# Patient Record
Sex: FEMALE | Race: WHITE | NOT HISPANIC OR LATINO | Employment: OTHER | URBAN - METROPOLITAN AREA
[De-identification: names, ages, dates, MRNs, and addresses within clinical notes are randomized per-mention and may not be internally consistent; named-entity substitution may affect disease eponyms.]

---

## 2022-08-24 ENCOUNTER — OFFICE VISIT (OUTPATIENT)
Dept: INTERNAL MEDICINE | Facility: CLINIC | Age: 57
End: 2022-08-24

## 2022-08-24 VITALS
HEIGHT: 62 IN | HEART RATE: 68 BPM | RESPIRATION RATE: 15 BRPM | OXYGEN SATURATION: 97 % | TEMPERATURE: 97.6 F | BODY MASS INDEX: 36.8 KG/M2 | SYSTOLIC BLOOD PRESSURE: 128 MMHG | WEIGHT: 200 LBS | DIASTOLIC BLOOD PRESSURE: 68 MMHG

## 2022-08-24 DIAGNOSIS — F33.1 MAJOR DEPRESSIVE DISORDER, RECURRENT EPISODE, MODERATE DEGREE: ICD-10-CM

## 2022-08-24 DIAGNOSIS — Z12.4 SCREENING FOR CERVICAL CANCER: ICD-10-CM

## 2022-08-24 DIAGNOSIS — Z00.00 ANNUAL PHYSICAL EXAM: Primary | ICD-10-CM

## 2022-08-24 DIAGNOSIS — F41.1 GENERALIZED ANXIETY DISORDER: ICD-10-CM

## 2022-08-24 DIAGNOSIS — E03.9 HYPOTHYROIDISM, UNSPECIFIED TYPE: ICD-10-CM

## 2022-08-24 DIAGNOSIS — E78.2 MIXED HYPERLIPIDEMIA: ICD-10-CM

## 2022-08-24 DIAGNOSIS — Z12.31 ENCOUNTER FOR SCREENING MAMMOGRAM FOR BREAST CANCER: ICD-10-CM

## 2022-08-24 LAB
ALBUMIN SERPL-MCNC: 4.5 G/DL (ref 3.5–5.2)
ALBUMIN/GLOB SERPL: 1.7 G/DL
ALP SERPL-CCNC: 93 U/L (ref 39–117)
ALT SERPL W P-5'-P-CCNC: 35 U/L (ref 1–33)
ANION GAP SERPL CALCULATED.3IONS-SCNC: 9.8 MMOL/L (ref 5–15)
AST SERPL-CCNC: 29 U/L (ref 1–32)
BASOPHILS # BLD AUTO: 0.05 10*3/MM3 (ref 0–0.2)
BASOPHILS NFR BLD AUTO: 0.9 % (ref 0–1.5)
BILIRUB SERPL-MCNC: 0.4 MG/DL (ref 0–1.2)
BUN SERPL-MCNC: 14 MG/DL (ref 6–20)
BUN/CREAT SERPL: 15.9 (ref 7–25)
CALCIUM SPEC-SCNC: 9.5 MG/DL (ref 8.6–10.5)
CHLORIDE SERPL-SCNC: 101 MMOL/L (ref 98–107)
CHOLEST SERPL-MCNC: 147 MG/DL (ref 0–200)
CO2 SERPL-SCNC: 27.2 MMOL/L (ref 22–29)
CREAT SERPL-MCNC: 0.88 MG/DL (ref 0.57–1)
DEPRECATED RDW RBC AUTO: 40.3 FL (ref 37–54)
EGFRCR SERPLBLD CKD-EPI 2021: 76.3 ML/MIN/1.73
EOSINOPHIL # BLD AUTO: 0.08 10*3/MM3 (ref 0–0.4)
EOSINOPHIL NFR BLD AUTO: 1.4 % (ref 0.3–6.2)
ERYTHROCYTE [DISTWIDTH] IN BLOOD BY AUTOMATED COUNT: 13.1 % (ref 12.3–15.4)
GLOBULIN UR ELPH-MCNC: 2.7 GM/DL
GLUCOSE SERPL-MCNC: 91 MG/DL (ref 65–99)
HCT VFR BLD AUTO: 43.8 % (ref 34–46.6)
HDLC SERPL-MCNC: 47 MG/DL (ref 40–60)
HGB BLD-MCNC: 14.2 G/DL (ref 12–15.9)
IMM GRANULOCYTES # BLD AUTO: 0.01 10*3/MM3 (ref 0–0.05)
IMM GRANULOCYTES NFR BLD AUTO: 0.2 % (ref 0–0.5)
LDLC SERPL CALC-MCNC: 82 MG/DL (ref 0–100)
LDLC/HDLC SERPL: 1.71 {RATIO}
LYMPHOCYTES # BLD AUTO: 1.93 10*3/MM3 (ref 0.7–3.1)
LYMPHOCYTES NFR BLD AUTO: 33.8 % (ref 19.6–45.3)
MCH RBC QN AUTO: 27.2 PG (ref 26.6–33)
MCHC RBC AUTO-ENTMCNC: 32.4 G/DL (ref 31.5–35.7)
MCV RBC AUTO: 83.9 FL (ref 79–97)
MONOCYTES # BLD AUTO: 0.38 10*3/MM3 (ref 0.1–0.9)
MONOCYTES NFR BLD AUTO: 6.7 % (ref 5–12)
NEUTROPHILS NFR BLD AUTO: 3.26 10*3/MM3 (ref 1.7–7)
NEUTROPHILS NFR BLD AUTO: 57 % (ref 42.7–76)
NRBC BLD AUTO-RTO: 0 /100 WBC (ref 0–0.2)
PLATELET # BLD AUTO: 250 10*3/MM3 (ref 140–450)
PMV BLD AUTO: 10 FL (ref 6–12)
POTASSIUM SERPL-SCNC: 4.2 MMOL/L (ref 3.5–5.2)
PROT SERPL-MCNC: 7.2 G/DL (ref 6–8.5)
RBC # BLD AUTO: 5.22 10*6/MM3 (ref 3.77–5.28)
SODIUM SERPL-SCNC: 138 MMOL/L (ref 136–145)
TRIGL SERPL-MCNC: 98 MG/DL (ref 0–150)
VLDLC SERPL-MCNC: 18 MG/DL (ref 5–40)
WBC NRBC COR # BLD: 5.71 10*3/MM3 (ref 3.4–10.8)

## 2022-08-24 PROCEDURE — 88142 CYTOPATH C/V THIN LAYER: CPT | Performed by: PHYSICIAN ASSISTANT

## 2022-08-24 PROCEDURE — 85025 COMPLETE CBC W/AUTO DIFF WBC: CPT | Performed by: PHYSICIAN ASSISTANT

## 2022-08-24 PROCEDURE — 84443 ASSAY THYROID STIM HORMONE: CPT | Performed by: PHYSICIAN ASSISTANT

## 2022-08-24 PROCEDURE — 99386 PREV VISIT NEW AGE 40-64: CPT | Performed by: PHYSICIAN ASSISTANT

## 2022-08-24 PROCEDURE — G0123 SCREEN CERV/VAG THIN LAYER: HCPCS | Performed by: PHYSICIAN ASSISTANT

## 2022-08-24 PROCEDURE — 80061 LIPID PANEL: CPT | Performed by: PHYSICIAN ASSISTANT

## 2022-08-24 PROCEDURE — 84439 ASSAY OF FREE THYROXINE: CPT | Performed by: PHYSICIAN ASSISTANT

## 2022-08-24 PROCEDURE — 36415 COLL VENOUS BLD VENIPUNCTURE: CPT | Performed by: PHYSICIAN ASSISTANT

## 2022-08-24 PROCEDURE — 99213 OFFICE O/P EST LOW 20 MIN: CPT | Performed by: PHYSICIAN ASSISTANT

## 2022-08-24 PROCEDURE — 80053 COMPREHEN METABOLIC PANEL: CPT | Performed by: PHYSICIAN ASSISTANT

## 2022-08-24 RX ORDER — ATORVASTATIN CALCIUM 10 MG/1
10 TABLET, FILM COATED ORAL DAILY
COMMUNITY
End: 2023-01-03

## 2022-08-24 RX ORDER — CETIRIZINE HYDROCHLORIDE 10 MG/1
TABLET ORAL
COMMUNITY
Start: 2022-07-21 | End: 2022-08-31 | Stop reason: SDUPTHER

## 2022-08-24 RX ORDER — LEVOTHYROXINE SODIUM 25 MCG
TABLET ORAL
COMMUNITY
Start: 2022-07-20 | End: 2022-08-31 | Stop reason: SDUPTHER

## 2022-08-24 RX ORDER — CITALOPRAM 20 MG/1
20 TABLET ORAL DAILY
Qty: 90 TABLET | Refills: 1 | Status: SHIPPED | OUTPATIENT
Start: 2022-08-24 | End: 2023-02-01 | Stop reason: SDUPTHER

## 2022-08-24 RX ORDER — CITALOPRAM 20 MG/1
20 TABLET ORAL DAILY
COMMUNITY
End: 2022-08-24 | Stop reason: SDUPTHER

## 2022-08-24 NOTE — ASSESSMENT & PLAN NOTE
Reviewed preventative medication recommendations that are age appropriate for the patient. Education provided for health and wellness. Encouraged healthy diet, regular exercise, and routine wellness checkups.

## 2022-08-24 NOTE — PROGRESS NOTES
"Chief Complaint  Anxiety and Depression    Subjective          Soheila Griffin presents to [facility]  Last PCP: Family [facility] - Bernie Nagy   Previous Specialists: none  Last labs: April 2022  Last mammogram: >2 yrs, no family history of breast cancer  Last pap: >2 yrs. She states the last pap was abnormal but the repeat was normal  Denies fhx cervical cancer  Last colonoscopy: 2016 at [facility],  no family history of colon cancer     Anxiety and depression: feels mood is doing well   Mood doing well on Celexa.    Thyroid: has been taking synthroid daily    HLD: pt has been taking atrovastatin 10mg at home because 20mg causes cramping in legs.  Denies chest pain, palpitations, dizziness, sob.  Pt states she has lost 50 lbs in the past year   She is doing Azeb.         Past Medical History:   Diagnosis Date   • Allergic    • Anxiety    • Depression    • Hyperlipidemia    • Hypothyroidism         Past Surgical History:   Procedure Laterality Date   • CHOLECYSTECTOMY  2017   • ENDOMETRIAL ABLATION  2016   • SHOULDER SURGERY Left 2016        Current Outpatient Medications on File Prior to Visit   Medication Sig Dispense Refill   • atorvastatin (LIPITOR) 10 MG tablet Take 10 mg by mouth Daily.     • cetirizine (zyrTEC) 10 MG tablet      • Synthroid 25 MCG tablet      • [DISCONTINUED] citalopram (CeleXA) 20 MG tablet Take 20 mg by mouth Daily.       No current facility-administered medications on file prior to visit.        Allergies   Allergen Reactions   • Sulfamethoxazole-Trimethoprim Photosensitivity   • Amoxicillin Rash     CHILDHOOD RX   • Oxycodone-Acetaminophen Palpitations       Social History     Tobacco Use   Smoking Status Never Smoker   Smokeless Tobacco Never Used      Objective   Vital Signs:   /68   Pulse 68   Temp 97.6 °F (36.4 °C)   Resp 15   Ht 157.5 cm (62\")   Wt 90.7 kg (200 lb)   SpO2 97%   BMI 36.58 kg/m²     Physical " Exam  Vitals and nursing note reviewed. Exam conducted with a chaperone present.   Constitutional:       Appearance: Normal appearance.   HENT:      Head: Normocephalic and atraumatic.      Nose: Nose normal.      Mouth/Throat:      Mouth: Mucous membranes are moist.   Eyes:      Extraocular Movements: Extraocular movements intact.      Conjunctiva/sclera: Conjunctivae normal.      Pupils: Pupils are equal, round, and reactive to light.   Cardiovascular:      Rate and Rhythm: Normal rate and regular rhythm.   Pulmonary:      Effort: Pulmonary effort is normal.      Breath sounds: Normal breath sounds.   Chest:   Breasts:      Right: Normal. No axillary adenopathy.      Left: Normal. No axillary adenopathy.       Abdominal:      General: Abdomen is flat. Bowel sounds are normal.      Palpations: Abdomen is soft.   Genitourinary:     Vagina: Normal.      Cervix: Normal.      Uterus: Normal.       Adnexa: Right adnexa normal and left adnexa normal.      Rectum: Normal.   Musculoskeletal:         General: Normal range of motion.      Cervical back: Normal range of motion and neck supple.   Lymphadenopathy:      Upper Body:      Right upper body: No axillary adenopathy.      Left upper body: No axillary adenopathy.   Neurological:      General: No focal deficit present.      Mental Status: She is alert and oriented to person, place, and time.   Psychiatric:         Mood and Affect: Mood normal.        Result Review :                 Assessment and Plan    Diagnoses and all orders for this visit:    1. Annual physical exam (Primary)  Assessment & Plan:  Reviewed preventative medication recommendations that are age appropriate for the patient. Education provided for health and wellness. Encouraged healthy diet, regular exercise, and routine wellness checkups.        2. Hypothyroidism, unspecified type  Comments:  Labs today, will adjust medicine if needed based on results  Orders:  -     TSH  -     T4, free    3. Encounter  for screening mammogram for breast cancer  -     Mammo Screening Bilateral With CAD    4. Generalized anxiety disorder    5. Major depressive disorder, recurrent episode, moderate degree (HCC)  Comments:  Mood stable, cont Celexa  Orders:  -     Comprehensive Metabolic Panel  -     CBC & Differential    6. Mixed hyperlipidemia  Comments:  Labs today, will adjust medicine if needed based on results  Orders:  -     Lipid Panel    7. Screening for cervical cancer  -     IGP,rfx Aptima HPV All Pth; Future  -     IGP,rfx Aptima HPV All Pth    Other orders  -     citalopram (CeleXA) 20 MG tablet; Take 1 tablet by mouth Daily.  Dispense: 90 tablet; Refill: 1      Follow Up   Return in about 5 months (around 1/24/2023).  Patient was given instructions and counseling regarding her condition or for health maintenance advice. Please see specific information pulled into the AVS if appropriate.

## 2022-08-24 NOTE — PATIENT INSTRUCTIONS
"High Cholesterol    High cholesterol is a condition in which the blood has high levels of a white, waxy substance similar to fat (cholesterol). The liver makes all the cholesterol that the body needs. The human body needs small amounts of cholesterol to help build cells. A person gets extra or excess cholesterol from the food that he or she eats.  The blood carries cholesterol from the liver to the rest of the body. If you have high cholesterol, deposits (plaques) may build up on the walls of your arteries. Arteries are the blood vessels that carry blood away from your heart. These plaques make the arteries narrow and stiff.  Cholesterol plaques increase your risk for heart attack and stroke. Work with your health care provider to keep your cholesterol levels in a healthy range.  What increases the risk?  The following factors may make you more likely to develop this condition:  Eating foods that are high in animal fat (saturated fat) or cholesterol.  Being overweight.  Not getting enough exercise.  A family history of high cholesterol (familial hypercholesterolemia).  Use of tobacco products.  Having diabetes.  What are the signs or symptoms?  There are no symptoms of this condition.  How is this diagnosed?  This condition may be diagnosed based on the results of a blood test.  If you are older than 20 years of age, your health care provider may check your cholesterol levels every 4-6 years.  You may be checked more often if you have high cholesterol or other risk factors for heart disease.  The blood test for cholesterol measures:  \"Bad\" cholesterol, or LDL cholesterol. This is the main type of cholesterol that causes heart disease. The desired level is less than 100 mg/dL.  \"Good\" cholesterol, or HDL cholesterol. HDL helps protect against heart disease by cleaning the arteries and carrying the LDL to the liver for processing. The desired level for HDL is 60 mg/dL or higher.  Triglycerides. These are fats that " your body can store or burn for energy. The desired level is less than 150 mg/dL.  Total cholesterol. This measures the total amount of cholesterol in your blood and includes LDL, HDL, and triglycerides. The desired level is less than 200 mg/dL.  How is this treated?  This condition may be treated with:  Diet changes. You may be asked to eat foods that have more fiber and less saturated fats or added sugar.  Lifestyle changes. These may include regular exercise, maintaining a healthy weight, and quitting use of tobacco products.  Medicines. These are given when diet and lifestyle changes have not worked. You may be prescribed a statin medicine to help lower your cholesterol levels.  Follow these instructions at home:  Eating and drinking    Eat a healthy, balanced diet. This diet includes:  Daily servings of a variety of fresh, frozen, or canned fruits and vegetables.  Daily servings of whole grain foods that are rich in fiber.  Foods that are low in saturated fats and trans fats. These include poultry and fish without skin, lean cuts of meat, and low-fat dairy products.  A variety of fish, especially oily fish that contain omega-3 fatty acids. Aim to eat fish at least 2 times a week.  Avoid foods and drinks that have added sugar.  Use healthy cooking methods, such as roasting, grilling, broiling, baking, poaching, steaming, and stir-frying. Do not foy your food except for stir-frying.    Lifestyle    Get regular exercise. Aim to exercise for a total of 150 minutes a week. Increase your activity level by doing activities such as gardening, walking, and taking the stairs.  Do not use any products that contain nicotine or tobacco, such as cigarettes, e-cigarettes, and chewing tobacco. If you need help quitting, ask your health care provider.    General instructions  Take over-the-counter and prescription medicines only as told by your health care provider.  Keep all follow-up visits as told by your health care  "provider. This is important.  Where to find more information  American Heart Association: www.heart.org  National Heart, Lung, and Blood [place]: www.nhlbi.nih.gov  Contact a health care provider if:  You have trouble achieving or maintaining a healthy diet or weight.  You are starting an exercise program.  You are unable to stop smoking.  Get help right away if:  You have chest pain.  You have trouble breathing.  You have any symptoms of a stroke. \"BE FAST\" is an easy way to remember the main warning signs of a stroke:  B - Balance. Signs are dizziness, sudden trouble walking, or loss of balance.  E - Eyes. Signs are trouble seeing or a sudden change in vision.  F - Face. Signs are sudden weakness or numbness of the face, or the face or eyelid drooping on one side.  A - Arms. Signs are weakness or numbness in an arm. This happens suddenly and usually on one side of the body.  S - Speech. Signs are sudden trouble speaking, slurred speech, or trouble understanding what people say.  T - Time. Time to call emergency services. Write down what time symptoms started.  You have other signs of a stroke, such as:  A sudden, severe headache with no known cause.  Nausea or vomiting.  Seizure.  These symptoms may represent a serious problem that is an emergency. Do not wait to see if the symptoms will go away. Get medical help right away. Call your local emergency services (911 in the [place] Do not drive yourself to the hospital.  Summary  Cholesterol plaques increase your risk for heart attack and stroke. Work with your health care provider to keep your cholesterol levels in a healthy range.  Eat a healthy, balanced diet, get regular exercise, and maintain a healthy weight.  Do not use any products that contain nicotine or tobacco, such as cigarettes, e-cigarettes, and chewing tobacco.  Get help right away if you have any symptoms of a stroke.  This information is not intended to replace advice given to you by your health " care provider. Make sure you discuss any questions you have with your health care provider.  Document Revised: 11/16/2020 Document Reviewed: 11/16/2020  Elsevier Patient Education © 2021 Elsevier Inc.

## 2022-08-25 LAB
T4 FREE SERPL-MCNC: 1.06 NG/DL (ref 0.93–1.7)
TSH SERPL DL<=0.05 MIU/L-ACNC: 3.13 UIU/ML (ref 0.27–4.2)

## 2022-08-26 ENCOUNTER — PATIENT MESSAGE (OUTPATIENT)
Dept: INTERNAL MEDICINE | Facility: CLINIC | Age: 57
End: 2022-08-26

## 2022-08-29 LAB
CONV .: NORMAL
CYTOLOGIST CVX/VAG CYTO: NORMAL
CYTOLOGY CVX/VAG DOC CYTO: NORMAL
CYTOLOGY CVX/VAG DOC THIN PREP: NORMAL
DX ICD CODE: NORMAL
HIV 1 & 2 AB SER-IMP: NORMAL
Lab: NORMAL
OTHER STN SPEC: NORMAL
RECOM F/U CVX/VAG CYTO: NORMAL
STAT OF ADQ CVX/VAG CYTO-IMP: NORMAL

## 2022-08-31 RX ORDER — CETIRIZINE HYDROCHLORIDE 10 MG/1
10 TABLET ORAL DAILY
Qty: 90 TABLET | Refills: 1 | Status: SHIPPED | OUTPATIENT
Start: 2022-08-31 | End: 2022-10-19 | Stop reason: SDUPTHER

## 2022-08-31 RX ORDER — LEVOTHYROXINE SODIUM 25 MCG
25 TABLET ORAL DAILY
Qty: 90 TABLET | Refills: 1 | Status: SHIPPED | OUTPATIENT
Start: 2022-08-31 | End: 2022-10-19 | Stop reason: SDUPTHER

## 2022-08-31 NOTE — TELEPHONE ENCOUNTER
From: Soheila Griffin  To: Brittnee Kovacs PA-C  Sent: 8/26/2022 9:04 AM EDT  Subject: Question regarding LIPID PANEL    Yay. So can I stop taking the cholesterol meds or just drop down to 5mg

## 2022-09-07 ENCOUNTER — OFFICE VISIT (OUTPATIENT)
Dept: INTERNAL MEDICINE | Facility: CLINIC | Age: 57
End: 2022-09-07

## 2022-09-07 DIAGNOSIS — N76.0 ACUTE VAGINITIS: ICD-10-CM

## 2022-09-07 DIAGNOSIS — Z12.4 SCREENING FOR CERVICAL CANCER: Primary | ICD-10-CM

## 2022-09-07 LAB
CANDIDA SPECIES: NEGATIVE
GARDNERELLA VAGINALIS: POSITIVE
T VAGINALIS DNA VAG QL PROBE+SIG AMP: NEGATIVE

## 2022-09-07 PROCEDURE — 87510 GARDNER VAG DNA DIR PROBE: CPT | Performed by: PHYSICIAN ASSISTANT

## 2022-09-07 PROCEDURE — 88142 CYTOPATH C/V THIN LAYER: CPT | Performed by: PHYSICIAN ASSISTANT

## 2022-09-07 PROCEDURE — G0123 SCREEN CERV/VAG THIN LAYER: HCPCS | Performed by: PHYSICIAN ASSISTANT

## 2022-09-07 PROCEDURE — 87480 CANDIDA DNA DIR PROBE: CPT | Performed by: PHYSICIAN ASSISTANT

## 2022-09-07 PROCEDURE — 87660 TRICHOMONAS VAGIN DIR PROBE: CPT | Performed by: PHYSICIAN ASSISTANT

## 2022-09-07 RX ORDER — METRONIDAZOLE 500 MG/1
500 TABLET ORAL 2 TIMES DAILY
Qty: 14 TABLET | Refills: 0 | Status: SHIPPED | OUTPATIENT
Start: 2022-09-07 | End: 2022-09-14

## 2022-09-07 NOTE — PROGRESS NOTES
Chief Complaint  Repeat pap- no charge  Subjective          Soheila Griffin presents to [facility]  Pt here for repeat pap due to insufficient evaluation from last labs  Denies vaginal discharge, odor, lesions  She states she had to repeat a pap in the past for unknown reason  Only abnormal pap was when she was pregnant years ago.      Past Medical History:   Diagnosis Date   • Allergic    • Anxiety    • Depression    • Hyperlipidemia    • Hypothyroidism         Past Surgical History:   Procedure Laterality Date   • CHOLECYSTECTOMY  2017   • ENDOMETRIAL ABLATION  2016   • SHOULDER SURGERY Left 2016        Current Outpatient Medications on File Prior to Visit   Medication Sig Dispense Refill   • atorvastatin (LIPITOR) 10 MG tablet Take 10 mg by mouth Daily.     • cetirizine (zyrTEC) 10 MG tablet Take 1 tablet by mouth Daily. 90 tablet 1   • citalopram (CeleXA) 20 MG tablet Take 1 tablet by mouth Daily. 90 tablet 1   • Synthroid 25 MCG tablet Take 1 tablet by mouth Daily. 90 tablet 1     No current facility-administered medications on file prior to visit.        Allergies   Allergen Reactions   • Sulfamethoxazole-Trimethoprim Photosensitivity   • Amoxicillin Rash     CHILDHOOD RX   • Oxycodone-Acetaminophen Palpitations       Social History     Tobacco Use   Smoking Status Never Smoker   Smokeless Tobacco Never Used          Objective   Vital Signs:   There were no vitals taken for this visit.    Physical Exam  Vitals and nursing note reviewed. Exam conducted with a chaperone present.   Constitutional:       Appearance: Normal appearance.   Cardiovascular:      Rate and Rhythm: Normal rate and regular rhythm.   Pulmonary:      Effort: Pulmonary effort is normal.      Breath sounds: Normal breath sounds.   Chest:   Breasts:      Right: No axillary adenopathy.      Left: No axillary adenopathy.       Abdominal:      General: Bowel sounds are normal.      Palpations:  Abdomen is soft.   Genitourinary:     Vagina: Normal.      Cervix: Normal.      Uterus: Normal.       Adnexa: Right adnexa normal and left adnexa normal.      Rectum: Normal.   Musculoskeletal:      Cervical back: Normal range of motion and neck supple.   Lymphadenopathy:      Upper Body:      Right upper body: No axillary adenopathy.      Left upper body: No axillary adenopathy.   Neurological:      General: No focal deficit present.      Mental Status: She is alert and oriented to person, place, and time.   Psychiatric:         Mood and Affect: Mood normal.        Result Review :                 Assessment and Plan    Diagnoses and all orders for this visit:    1. Screening for cervical cancer (Primary)  Comments:  repeat pap today. Will add vaginal screen since previous unable to be read due to inflammatory cells. PE WNL.  Orders:  -     IGP,rfx Aptima HPV All Pth; Future    2. Acute vaginitis  -     Gardnerella vaginalis, Trichomonas vaginalis, Candida albicans, DNA - Swab, Vagina; Future        Follow Up   Return if symptoms worsen or fail to improve.  Patient was given instructions and counseling regarding her condition or for health maintenance advice. Please see specific information pulled into the AVS if appropriate.         This encounter was created in error - please disregard.

## 2022-09-14 DIAGNOSIS — Z12.4 SCREENING FOR CERVICAL CANCER: Primary | ICD-10-CM

## 2022-10-19 RX ORDER — LEVOTHYROXINE SODIUM 25 MCG
25 TABLET ORAL DAILY
Qty: 90 TABLET | Refills: 1 | Status: SHIPPED | OUTPATIENT
Start: 2022-10-19 | End: 2023-02-01 | Stop reason: SDUPTHER

## 2022-10-19 RX ORDER — CETIRIZINE HYDROCHLORIDE 10 MG/1
10 TABLET ORAL DAILY
Qty: 90 TABLET | Refills: 1 | Status: SHIPPED | OUTPATIENT
Start: 2022-10-19 | End: 2023-02-01 | Stop reason: SDUPTHER

## 2022-10-19 NOTE — TELEPHONE ENCOUNTER
Caller: Elias Soheila NOGUERA    Relationship: Self    Best call back number: [phone]    Requested Prescriptions:   Requested Prescriptions     Pending Prescriptions Disp Refills   • Synthroid 25 MCG tablet 90 tablet 1     Sig: Take 1 tablet by mouth Daily.   • cetirizine (zyrTEC) 10 MG tablet 90 tablet 1     Sig: Take 1 tablet by mouth Daily.        [place] [place] [place] [address] [phone] [facility] [phone] [facility]     Additional details provided by patient:         Does the patient have less than a 3 day supply:  [x] Yes  [] No    Renetta Brantley Rep   10/19/22 09:51 EDT

## 2022-10-20 RX ORDER — CETIRIZINE HYDROCHLORIDE 10 MG/1
10 TABLET ORAL DAILY
Qty: 90 TABLET | Refills: 1 | OUTPATIENT
Start: 2022-10-20

## 2022-10-20 RX ORDER — LEVOTHYROXINE SODIUM 25 MCG
25 TABLET ORAL DAILY
Qty: 90 TABLET | Refills: 1 | OUTPATIENT
Start: 2022-10-20

## 2023-01-03 ENCOUNTER — OFFICE VISIT (OUTPATIENT)
Dept: OBSTETRICS AND GYNECOLOGY | Facility: CLINIC | Age: 58
End: 2023-01-03
Payer: OTHER GOVERNMENT

## 2023-01-03 ENCOUNTER — TRANSCRIBE ORDERS (OUTPATIENT)
Dept: ADMINISTRATIVE | Facility: HOSPITAL | Age: 58
End: 2023-01-03
Payer: OTHER GOVERNMENT

## 2023-01-03 VITALS
WEIGHT: 189 LBS | SYSTOLIC BLOOD PRESSURE: 125 MMHG | DIASTOLIC BLOOD PRESSURE: 83 MMHG | BODY MASS INDEX: 34.78 KG/M2 | HEART RATE: 102 BPM | HEIGHT: 62 IN

## 2023-01-03 DIAGNOSIS — N90.4 LICHEN SCLEROSUS OF FEMALE GENITALIA: ICD-10-CM

## 2023-01-03 DIAGNOSIS — Z12.31 SCREENING MAMMOGRAM FOR BREAST CANCER: Primary | ICD-10-CM

## 2023-01-03 DIAGNOSIS — Z01.419 WELL WOMAN EXAM WITH ROUTINE GYNECOLOGICAL EXAM: ICD-10-CM

## 2023-01-03 DIAGNOSIS — R87.615 UNSATISFACTORY CERVICAL PAPANICOLAOU SMEAR: Primary | ICD-10-CM

## 2023-01-03 DIAGNOSIS — N95.8 GENITOURINARY SYNDROME OF MENOPAUSE: ICD-10-CM

## 2023-01-03 PROCEDURE — G0123 SCREEN CERV/VAG THIN LAYER: HCPCS | Performed by: OBSTETRICS & GYNECOLOGY

## 2023-01-03 PROCEDURE — 87624 HPV HI-RISK TYP POOLED RSLT: CPT | Performed by: OBSTETRICS & GYNECOLOGY

## 2023-01-03 PROCEDURE — 1159F MED LIST DOCD IN RCRD: CPT | Performed by: OBSTETRICS & GYNECOLOGY

## 2023-01-03 PROCEDURE — 99204 OFFICE O/P NEW MOD 45 MIN: CPT | Performed by: OBSTETRICS & GYNECOLOGY

## 2023-01-03 PROCEDURE — 1160F RVW MEDS BY RX/DR IN RCRD: CPT | Performed by: OBSTETRICS & GYNECOLOGY

## 2023-01-03 RX ORDER — ESTRADIOL 10 UG/1
1 INSERT VAGINAL 2 TIMES WEEKLY
Qty: 8 TABLET | Refills: 11 | Status: SHIPPED | OUTPATIENT
Start: 2023-01-05

## 2023-01-03 RX ORDER — CLOBETASOL PROPIONATE 0.5 MG/G
1 OINTMENT TOPICAL 2 TIMES DAILY
Qty: 45 G | Refills: 3 | Status: SHIPPED | OUTPATIENT
Start: 2023-01-03

## 2023-01-03 NOTE — ASSESSMENT & PLAN NOTE
Pt had a pap smear in 8/2022 which was unsatisfactory and repeat in 9/2022 was also unsatisfactory  PCP subsequently ref pt her for further eval  Patient does have moderate vaginal atrophy on exam.  We discussed risks benefits of localized vaginal estrogen therapy.

## 2023-01-03 NOTE — PROGRESS NOTES
Well Woman Visit    CC: Scheduled annual well gyn visit  No chief complaint on file.      Myriad intake in the past?: {YES NO:42598}    {IF HAS FILLED OUT MYRIADTABLET IN PAST (Optional):06554}    {Birth control or /HRT (Optional):91159}    HPI:   57 y.o.No obstetric history on file.     Menses:   q *** days, lasts *** days, changes products q ***hrs on heaviest days.     Pain:  {APPAIN:96320}    {PCP:98329}  History: PMHx, Meds, Allergies, PSHx, Social Hx, and POBHx all reviewed and updated.    {APWWECO (Optional):21395}    PHYSICAL EXAM:  There were no vitals taken for this visit. Not found.  General- NAD, alert and oriented, appropriate  Psych- Normal mood, good memory  Neck- No masses, no thyroid enlargement  CV- Regular rhythm, no murnurs  Resp- CTA to bases, no wheezes  Abdomen- Soft, non distended, non tender, no masses    Breast left-  Bilaterally symmetrical, no masses, non tender, no nipple discharge  Breast right- Bilaterally symmetrical, no masses, non tender, no nipple discharge    External genitalia- Normal female, no lesions  Urethra/meatus- Normal, no masses, non tender  Bladder- Normal, no masses, non tender  Vagina- Normal, no atrophy, no lesions, no discharge.  {Prolapse (Optional):31205}  Cvx- {APCVX:01529}  Uterus- {APUTERUS:98659}  Adnexa- {APADNEXA:20313}  Anus/Rectum/Perineum- {APRECTAL:26685}    Lymphatic- No palpable neck, axillary, or groin nodes  Ext- No edema, no cyanosis    Skin- No lesions, no rashes, no acanthosis nigricans  {APNEXPLANON (Optional):73008}    ASSESSMENT and PLAN:    Diagnoses and all orders for this visit:    1. Well woman exam with routine gynecological exam (Primary)        Preventative:  • {WWE Preventative:32562}  {MYRIAD (Optional):29042}  {APGYNCOUNSELING (Optional):07702}  She understands the importance of having any ordered tests to be performed in a timely fashion.  The risks of not performing them include, but are not limited to, advanced cancer stages,  bone loss from osteoporosis and/or subsequent increase in morbidity and/or mortality.  She is encouraged to review her results online and/or contact or office if she has questions.     Follow Up:  No follow-ups on file.    {Separate E+M Time Carve Out (Optional):06270}  {Time Spent-Use for E/M Coding (Optional):99169}      Gianna Weber MD  01/03/2023    [facility]  [facility] [place] [address]  Dept: [phone]  Dept Fax: [phone]  Loc: [phone]  Loc Fax: [phone]

## 2023-01-03 NOTE — ASSESSMENT & PLAN NOTE
Patient complained of vulvovaginal pruritus  On exam patient has symmetric changes of the vulva consistent with lichen sclerosis.  There is white thickened skin with multiple excoriations from clitoral prepuce to the bilateral labia minora the introitus and the bilateral labia majora and perineum.  There are moderate clitoral adhesions and mild bilateral resorption of labia minora.  Discussed lichen sclerosus and we will do a trial of clobetasol twice daily for 6 weeks.  Patient will follow-up for a confirmatory vulvar biopsy

## 2023-01-03 NOTE — PROGRESS NOTES
GYN new patient    CC: unsatisfactory pap smear    Tobacco/Nicotine use:  No    HPI:   57 y.o. Contraception or HRT: Post menopausal, using no HRT  Pt has concerns she would like to discuss.      History: PMHx, Meds, Allergies, PSHx, Social Hx, and POBHx all reviewed and updated.  PCP:Brittnee Kovacs PA-C      Review of Systems     /83   Pulse 102   Ht 157.5 cm (62\")   Wt 85.7 kg (189 lb)   Breastfeeding No   BMI 34.57 kg/m²     Physical Exam  Exam conducted with a chaperone present.   Genitourinary:     Exam position: Lithotomy position.      Labia:         Left: Lesion present.       Urethra: No prolapse.      Vagina: No prolapsed vaginal walls. Lesions:  Moderate vaginal atrophy.      Cervix: Normal.             ASSESSMENT AND PLAN:  Problem Visit    Diagnoses and all orders for this visit:    1. Unsatisfactory cervical Papanicolaou smear (Primary)  Assessment & Plan:  Pt had a pap smear in 2022 which was unsatisfactory and repeat in 2022 was also unsatisfactory  PCP subsequently ref pt her for further eval  Patient does have moderate vaginal atrophy on exam.  We discussed risks benefits of localized vaginal estrogen therapy.      2. Well woman exam with routine gynecological exam  -     IgP, Aptima HPV    3. Lichen sclerosus of female genitalia  Assessment & Plan:  Patient complained of vulvovaginal pruritus  On exam patient has symmetric changes of the vulva consistent with lichen sclerosis.  There is white thickened skin with multiple excoriations from clitoral prepuce to the bilateral labia minora the introitus and the bilateral labia majora and perineum.  There are moderate clitoral adhesions and mild bilateral resorption of labia minora.  Discussed lichen sclerosus and we will do a trial of clobetasol twice daily for 6 weeks.  Patient will follow-up for a confirmatory vulvar biopsy    Orders:  -     clobetasol (TEMOVATE) 0.05 % ointment; Apply 1 application topically to the  appropriate area as directed 2 (Two) Times a Day.  Dispense: 45 g; Refill: 3    4. Genitourinary syndrome of menopause  Assessment & Plan:  Exam consistent with moderate GSM    Orders:  -     estradiol (Vagifem) 10 MCG tablet vaginal tablet; Insert 1 tablet into the vagina 2 (Two) Times a Week.  Dispense: 8 tablet; Refill: 11                Follow Up:  Return in about 6 weeks (around 2/14/2023) for Vulvar biopsy.        Gianna Weber MD  01/03/2023

## 2023-01-08 LAB
CYTOLOGIST CVX/VAG CYTO: NORMAL
CYTOLOGY CVX/VAG DOC CYTO: NORMAL
CYTOLOGY CVX/VAG DOC THIN PREP: NORMAL
DX ICD CODE: NORMAL
HIV 1 & 2 AB SER-IMP: NORMAL
HPV I/H RISK 4 DNA CVX QL PROBE+SIG AMP: NEGATIVE
OTHER STN SPEC: NORMAL
STAT OF ADQ CVX/VAG CYTO-IMP: NORMAL

## 2023-01-30 RX ORDER — CITALOPRAM 20 MG/1
20 TABLET ORAL DAILY
Qty: 90 TABLET | Refills: 1 | Status: CANCELLED | OUTPATIENT
Start: 2023-01-30

## 2023-01-31 ENCOUNTER — HOSPITAL ENCOUNTER (OUTPATIENT)
Dept: MAMMOGRAPHY | Facility: HOSPITAL | Age: 58
Discharge: HOME | End: 2023-01-31
Admitting: PHYSICIAN ASSISTANT
Payer: OTHER GOVERNMENT

## 2023-01-31 DIAGNOSIS — Z12.31 SCREENING MAMMOGRAM FOR BREAST CANCER: ICD-10-CM

## 2023-01-31 PROCEDURE — 77067 SCR MAMMO BI INCL CAD: CPT

## 2023-01-31 PROCEDURE — 77063 BREAST TOMOSYNTHESIS BI: CPT

## 2023-02-01 ENCOUNTER — OFFICE VISIT (OUTPATIENT)
Dept: INTERNAL MEDICINE | Facility: CLINIC | Age: 58
End: 2023-02-01
Payer: OTHER GOVERNMENT

## 2023-02-01 VITALS
WEIGHT: 184 LBS | SYSTOLIC BLOOD PRESSURE: 118 MMHG | BODY MASS INDEX: 33.86 KG/M2 | OXYGEN SATURATION: 96 % | HEART RATE: 80 BPM | TEMPERATURE: 97.7 F | RESPIRATION RATE: 15 BRPM | HEIGHT: 62 IN | DIASTOLIC BLOOD PRESSURE: 70 MMHG

## 2023-02-01 DIAGNOSIS — F41.1 GENERALIZED ANXIETY DISORDER: ICD-10-CM

## 2023-02-01 DIAGNOSIS — E03.9 HYPOTHYROIDISM, UNSPECIFIED TYPE: ICD-10-CM

## 2023-02-01 DIAGNOSIS — F33.1 MAJOR DEPRESSIVE DISORDER, RECURRENT EPISODE, MODERATE DEGREE: Primary | ICD-10-CM

## 2023-02-01 LAB
ALBUMIN SERPL-MCNC: 4.5 G/DL (ref 3.5–5.2)
ALBUMIN/GLOB SERPL: 1.7 G/DL
ALP SERPL-CCNC: 93 U/L (ref 39–117)
ALT SERPL W P-5'-P-CCNC: 31 U/L (ref 1–33)
ANION GAP SERPL CALCULATED.3IONS-SCNC: 7 MMOL/L (ref 5–15)
AST SERPL-CCNC: 22 U/L (ref 1–32)
BASOPHILS # BLD AUTO: 0.05 10*3/MM3 (ref 0–0.2)
BASOPHILS NFR BLD AUTO: 0.9 % (ref 0–1.5)
BILIRUB SERPL-MCNC: 0.3 MG/DL (ref 0–1.2)
BUN SERPL-MCNC: 24 MG/DL (ref 6–20)
BUN/CREAT SERPL: 32 (ref 7–25)
CALCIUM SPEC-SCNC: 9.4 MG/DL (ref 8.6–10.5)
CHLORIDE SERPL-SCNC: 100 MMOL/L (ref 98–107)
CHOLEST SERPL-MCNC: 215 MG/DL (ref 0–200)
CO2 SERPL-SCNC: 30 MMOL/L (ref 22–29)
CREAT SERPL-MCNC: 0.75 MG/DL (ref 0.57–1)
DEPRECATED RDW RBC AUTO: 37.8 FL (ref 37–54)
EGFRCR SERPLBLD CKD-EPI 2021: 92.4 ML/MIN/1.73
EOSINOPHIL # BLD AUTO: 0.1 10*3/MM3 (ref 0–0.4)
EOSINOPHIL NFR BLD AUTO: 1.9 % (ref 0.3–6.2)
ERYTHROCYTE [DISTWIDTH] IN BLOOD BY AUTOMATED COUNT: 12.6 % (ref 12.3–15.4)
GLOBULIN UR ELPH-MCNC: 2.7 GM/DL
GLUCOSE SERPL-MCNC: 87 MG/DL (ref 65–99)
HCT VFR BLD AUTO: 40.8 % (ref 34–46.6)
HDLC SERPL-MCNC: 64 MG/DL (ref 40–60)
HGB BLD-MCNC: 13.4 G/DL (ref 12–15.9)
IMM GRANULOCYTES # BLD AUTO: 0.01 10*3/MM3 (ref 0–0.05)
IMM GRANULOCYTES NFR BLD AUTO: 0.2 % (ref 0–0.5)
LDLC SERPL CALC-MCNC: 139 MG/DL (ref 0–100)
LDLC/HDLC SERPL: 2.15 {RATIO}
LYMPHOCYTES # BLD AUTO: 1.96 10*3/MM3 (ref 0.7–3.1)
LYMPHOCYTES NFR BLD AUTO: 36.8 % (ref 19.6–45.3)
MCH RBC QN AUTO: 27.5 PG (ref 26.6–33)
MCHC RBC AUTO-ENTMCNC: 32.8 G/DL (ref 31.5–35.7)
MCV RBC AUTO: 83.6 FL (ref 79–97)
MONOCYTES # BLD AUTO: 0.39 10*3/MM3 (ref 0.1–0.9)
MONOCYTES NFR BLD AUTO: 7.3 % (ref 5–12)
NEUTROPHILS NFR BLD AUTO: 2.82 10*3/MM3 (ref 1.7–7)
NEUTROPHILS NFR BLD AUTO: 52.9 % (ref 42.7–76)
NRBC BLD AUTO-RTO: 0 /100 WBC (ref 0–0.2)
PLATELET # BLD AUTO: 241 10*3/MM3 (ref 140–450)
PMV BLD AUTO: 9.8 FL (ref 6–12)
POTASSIUM SERPL-SCNC: 4.4 MMOL/L (ref 3.5–5.2)
PROT SERPL-MCNC: 7.2 G/DL (ref 6–8.5)
RBC # BLD AUTO: 4.88 10*6/MM3 (ref 3.77–5.28)
SODIUM SERPL-SCNC: 137 MMOL/L (ref 136–145)
T4 FREE SERPL-MCNC: 1.06 NG/DL (ref 0.93–1.7)
TRIGL SERPL-MCNC: 67 MG/DL (ref 0–150)
TSH SERPL DL<=0.05 MIU/L-ACNC: 2.15 UIU/ML (ref 0.27–4.2)
VLDLC SERPL-MCNC: 12 MG/DL (ref 5–40)
WBC NRBC COR # BLD: 5.33 10*3/MM3 (ref 3.4–10.8)

## 2023-02-01 PROCEDURE — 36415 COLL VENOUS BLD VENIPUNCTURE: CPT | Performed by: PHYSICIAN ASSISTANT

## 2023-02-01 PROCEDURE — 85025 COMPLETE CBC W/AUTO DIFF WBC: CPT | Performed by: PHYSICIAN ASSISTANT

## 2023-02-01 PROCEDURE — 80061 LIPID PANEL: CPT | Performed by: PHYSICIAN ASSISTANT

## 2023-02-01 PROCEDURE — 99214 OFFICE O/P EST MOD 30 MIN: CPT | Performed by: PHYSICIAN ASSISTANT

## 2023-02-01 PROCEDURE — 84443 ASSAY THYROID STIM HORMONE: CPT | Performed by: PHYSICIAN ASSISTANT

## 2023-02-01 PROCEDURE — 84439 ASSAY OF FREE THYROXINE: CPT | Performed by: PHYSICIAN ASSISTANT

## 2023-02-01 PROCEDURE — 80053 COMPREHEN METABOLIC PANEL: CPT | Performed by: PHYSICIAN ASSISTANT

## 2023-02-01 RX ORDER — CETIRIZINE HYDROCHLORIDE 10 MG/1
10 TABLET ORAL DAILY
Qty: 90 TABLET | Refills: 1 | Status: SHIPPED | OUTPATIENT
Start: 2023-02-01

## 2023-02-01 RX ORDER — CITALOPRAM 20 MG/1
20 TABLET ORAL DAILY
Qty: 90 TABLET | Refills: 1 | Status: SHIPPED | OUTPATIENT
Start: 2023-02-01

## 2023-02-01 RX ORDER — LEVOTHYROXINE SODIUM 25 MCG
25 TABLET ORAL DAILY
Qty: 90 TABLET | Refills: 1 | Status: SHIPPED | OUTPATIENT
Start: 2023-02-01

## 2023-02-01 RX ORDER — CLOBETASOL PROPIONATE 0.5 MG/G
1 OINTMENT TOPICAL 2 TIMES DAILY
Qty: 45 G | Refills: 3 | Status: CANCELLED | OUTPATIENT
Start: 2023-02-01

## 2023-02-01 NOTE — PROGRESS NOTES
Chief Complaint  Anxiety, Hyperlipidemia, Hypothyroidism, and Depression    Subjective          Soheila Griffin presents to [facility]  History of Present Illness  Pt here for follow up     Hypothyroid: no issue taking synthroid   Depression and anxiety: doing well on celexa  Lichen sclerosis: seen by GYN, symptoms of irritation, rash, and itching resolved with steroid cream  She has not needed estrogen replacement    Colonoscopy: UTD per pt, had at [place] in 2016 and was told 10 yr f/u      Past Medical History:   Diagnosis Date   • Abnormal Pap smear of cervix    • Allergic    • Anxiety    • Depression    • Dysplasia of cervix     1990. [facility]   • Hyperlipidemia    • Hypothyroidism         Past Surgical History:   Procedure Laterality Date   • CHOLECYSTECTOMY  2017   • ENDOMETRIAL ABLATION  2016   • SHOULDER SURGERY Left 2016        Current Outpatient Medications on File Prior to Visit   Medication Sig Dispense Refill   • clobetasol (TEMOVATE) 0.05 % ointment Apply 1 application topically to the appropriate area as directed 2 (Two) Times a Day. 45 g 3   • [DISCONTINUED] cetirizine (zyrTEC) 10 MG tablet Take 1 tablet by mouth Daily. 90 tablet 1   • [DISCONTINUED] citalopram (CeleXA) 20 MG tablet Take 1 tablet by mouth Daily. 90 tablet 1   • [DISCONTINUED] Synthroid 25 MCG tablet Take 1 tablet by mouth Daily. 90 tablet 1   • estradiol (Vagifem) 10 MCG tablet vaginal tablet Insert 1 tablet into the vagina 2 (Two) Times a Week. 8 tablet 11     No current facility-administered medications on file prior to visit.        Allergies   Allergen Reactions   • Sulfamethoxazole-Trimethoprim Photosensitivity   • Amoxicillin Rash     CHILDHOOD RX   • Oxycodone-Acetaminophen Palpitations       Social History     Tobacco Use   Smoking Status Never   Smokeless Tobacco Never          Objective   Vital Signs:   /70   Pulse 80   Temp 97.7 °F (36.5 °C)   Resp 15   Ht  "157.5 cm (62\")   Wt 83.5 kg (184 lb)   SpO2 96%   BMI 33.65 kg/m²     Physical Exam  Vitals reviewed.   Constitutional:       Appearance: Normal appearance.   HENT:      Head: Normocephalic and atraumatic.      Nose: Nose normal.      Mouth/Throat:      Mouth: Mucous membranes are moist.   Eyes:      Extraocular Movements: Extraocular movements intact.      Conjunctiva/sclera: Conjunctivae normal.      Pupils: Pupils are equal, round, and reactive to light.   Cardiovascular:      Rate and Rhythm: Normal rate and regular rhythm.   Pulmonary:      Effort: Pulmonary effort is normal.      Breath sounds: Normal breath sounds.   Abdominal:      General: Abdomen is flat. Bowel sounds are normal.      Palpations: Abdomen is soft.   Musculoskeletal:         General: Normal range of motion.   Neurological:      General: No focal deficit present.      Mental Status: She is alert and oriented to person, place, and time.   Psychiatric:         Mood and Affect: Mood normal.        Result Review :                 Assessment and Plan    Diagnoses and all orders for this visit:    1. Major depressive disorder, recurrent episode, moderate degree (HCC) (Primary)  Assessment & Plan:  Patient's depression is recurrent and is mild without psychosis. Their depression is currently active and the condition is improving with treatment. This will be reassessed at the next regular appointment. F/U as described:patient will continue current medication therapy.      2. Generalized anxiety disorder  -     Comprehensive Metabolic Panel  -     CBC & Differential  -     TSH    3. Hypothyroidism, unspecified type  Assessment & Plan:  Labs today, will adjust medication based on results.      Orders:  -     Comprehensive Metabolic Panel  -     CBC & Differential  -     TSH  -     Lipid Panel  -     T4, Free    Other orders  -     Synthroid 25 MCG tablet; Take 1 tablet by mouth Daily.  Dispense: 90 tablet; Refill: 1  -     citalopram (CeleXA) 20 MG " tablet; Take 1 tablet by mouth Daily.  Dispense: 90 tablet; Refill: 1  -     cetirizine (zyrTEC) 10 MG tablet; Take 1 tablet by mouth Daily.  Dispense: 90 tablet; Refill: 1      Follow Up   Return in about 6 months (around 8/1/2023).  Patient was given instructions and counseling regarding her condition or for health maintenance advice. Please see specific information pulled into the AVS if appropriate.

## 2023-02-14 NOTE — PROGRESS NOTES
GYN Visit    CC: Lichen sclerosus    HPI:   57 y.o. Contraception or HRT: Post menopausal, using no HRT  Pt has concerns she would like to discuss.          History: PMHx, Meds, Allergies, PSHx, Social Hx, and POBHx all reviewed and updated.    PHYSICAL EXAM:  /73   Pulse 72   Wt 85.3 kg (188 lb)   Breastfeeding No   BMI 34.39 kg/m²   General- NAD, alert and oriented, appropriate  Psych- Normal mood, good memory      External genitalia- Normal female, no lesions no active LSE  Urethra/meatus- Normal, no masses, non tender, no prolapse  ASSESSMENT AND PLAN:  Diagnoses and all orders for this visit:    1. Lichen sclerosus of female genitalia (Primary)  Assessment & Plan:  Pt's symptoms have completely resolved since the first dose of clobetasol ointment  On physical exam there is no further active lichen sclerosis.  There are no white areas of skin thickening  Pt would prefer not to have a a vulvar biopsy at this time      2. Genitourinary syndrome of menopause  Assessment & Plan:  Pt didn't start localized vaginal estrogen therapy  She isn't certain she needs it since LSE is improved clincially      3. Menopausal symptoms  Assessment & Plan:  Pt c/o significant hot flashes and night sweats  Counseled the pt at length re: r/b of combined HRT for treatment of VMS  At this time pt is uncertain whether or not she would like to start HRT and would like to discuss it with her .  Explained I recommend transdermal estrogen therapy with PO progesterone          Counseling:         Follow Up:  Return in about 1 year (around 2/15/2024) for Annual physical.          Gianna Weber MD  02/15/2023

## 2023-02-15 ENCOUNTER — OFFICE VISIT (OUTPATIENT)
Dept: OBSTETRICS AND GYNECOLOGY | Facility: CLINIC | Age: 58
End: 2023-02-15
Payer: OTHER GOVERNMENT

## 2023-02-15 VITALS
BODY MASS INDEX: 34.39 KG/M2 | SYSTOLIC BLOOD PRESSURE: 148 MMHG | HEART RATE: 72 BPM | DIASTOLIC BLOOD PRESSURE: 73 MMHG | WEIGHT: 188 LBS

## 2023-02-15 DIAGNOSIS — N90.4 LICHEN SCLEROSUS OF FEMALE GENITALIA: Primary | ICD-10-CM

## 2023-02-15 DIAGNOSIS — N95.8 GENITOURINARY SYNDROME OF MENOPAUSE: ICD-10-CM

## 2023-02-15 DIAGNOSIS — N95.1 MENOPAUSAL SYMPTOMS: ICD-10-CM

## 2023-02-15 PROCEDURE — 99213 OFFICE O/P EST LOW 20 MIN: CPT | Performed by: OBSTETRICS & GYNECOLOGY

## 2023-02-15 NOTE — ASSESSMENT & PLAN NOTE
Pt's symptoms have completely resolved since the first dose of clobetasol ointment  On physical exam there is no further active lichen sclerosis.  There are no white areas of skin thickening  Pt would prefer not to have a a vulvar biopsy at this time

## 2023-02-15 NOTE — ASSESSMENT & PLAN NOTE
Pt didn't start localized vaginal estrogen therapy  She isn't certain she needs it since LSE is improved clincially

## 2023-02-15 NOTE — ASSESSMENT & PLAN NOTE
Pt c/o significant hot flashes and night sweats  Counseled the pt at length re: r/b of combined HRT for treatment of VMS  At this time pt is uncertain whether or not she would like to start HRT and would like to discuss it with her .  Explained I recommend transdermal estrogen therapy with PO progesterone

## 2023-04-11 RX ORDER — LEVOTHYROXINE SODIUM 25 MCG
25 TABLET ORAL DAILY
Qty: 90 TABLET | Refills: 1 | OUTPATIENT
Start: 2023-04-11

## 2023-05-16 ENCOUNTER — HOSPITAL ENCOUNTER (EMERGENCY)
Facility: HOSPITAL | Age: 58
Discharge: HOME | End: 2023-05-16
Attending: EMERGENCY MEDICINE | Admitting: EMERGENCY MEDICINE
Payer: OTHER GOVERNMENT

## 2023-05-16 ENCOUNTER — APPOINTMENT (OUTPATIENT)
Dept: GENERAL RADIOLOGY | Facility: HOSPITAL | Age: 58
End: 2023-05-16
Payer: OTHER GOVERNMENT

## 2023-05-16 VITALS
SYSTOLIC BLOOD PRESSURE: 153 MMHG | DIASTOLIC BLOOD PRESSURE: 73 MMHG | RESPIRATION RATE: 20 BRPM | HEIGHT: 62 IN | TEMPERATURE: 98.8 F | HEART RATE: 76 BPM | BODY MASS INDEX: 35.88 KG/M2 | WEIGHT: 195 LBS | OXYGEN SATURATION: 98 %

## 2023-05-16 DIAGNOSIS — S93.402A MODERATE LEFT ANKLE SPRAIN, INITIAL ENCOUNTER: Primary | ICD-10-CM

## 2023-05-16 PROCEDURE — 99283 EMERGENCY DEPT VISIT LOW MDM: CPT

## 2023-05-16 PROCEDURE — 96372 THER/PROPH/DIAG INJ SC/IM: CPT

## 2023-05-16 PROCEDURE — 73110 X-RAY EXAM OF WRIST: CPT

## 2023-05-16 PROCEDURE — 73610 X-RAY EXAM OF ANKLE: CPT

## 2023-05-16 PROCEDURE — 25010000002 KETOROLAC TROMETHAMINE PER 15 MG: Performed by: EMERGENCY MEDICINE

## 2023-05-16 RX ORDER — HYDROCODONE BITARTRATE AND ACETAMINOPHEN 5; 325 MG/1; MG/1
1 TABLET ORAL EVERY 6 HOURS PRN
Qty: 15 TABLET | Refills: 0 | Status: SHIPPED | OUTPATIENT
Start: 2023-05-16

## 2023-05-16 RX ORDER — KETOROLAC TROMETHAMINE 30 MG/ML
30 INJECTION, SOLUTION INTRAMUSCULAR; INTRAVENOUS ONCE
Status: COMPLETED | OUTPATIENT
Start: 2023-05-16 | End: 2023-05-16

## 2023-05-16 RX ADMIN — KETOROLAC TROMETHAMINE 30 MG: 30 INJECTION, SOLUTION INTRAMUSCULAR; INTRAVENOUS at 19:51

## 2023-05-17 NOTE — DISCHARGE INSTRUCTIONS
Rest, ice, and elevate.  Take your meds as prescribed.  You may also take over-the-counter Motrin or Aleve with your medications as needed for pain.  Wear your boot for stability.  Use your crutches for ambulation.  Follow-up with your primary care provider this week so that she may get a referral over to orthopedics for further evaluation and treatment to ensure that your symptoms are improving and they do not want any further imaging.  Return to the emergency department for any significant increase in swelling, any redness, or any new or worse concerns.
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
## 2023-05-26 DIAGNOSIS — S93.402D SPRAIN OF LEFT ANKLE, UNSPECIFIED LIGAMENT, SUBSEQUENT ENCOUNTER: Primary | ICD-10-CM

## 2023-05-31 ENCOUNTER — OFFICE VISIT (OUTPATIENT)
Dept: INTERNAL MEDICINE | Facility: CLINIC | Age: 58
End: 2023-05-31

## 2023-05-31 VITALS
OXYGEN SATURATION: 96 % | HEART RATE: 81 BPM | TEMPERATURE: 97.5 F | HEIGHT: 62 IN | DIASTOLIC BLOOD PRESSURE: 79 MMHG | BODY MASS INDEX: 38.35 KG/M2 | WEIGHT: 208.4 LBS | SYSTOLIC BLOOD PRESSURE: 132 MMHG | RESPIRATION RATE: 18 BRPM

## 2023-05-31 DIAGNOSIS — S99.912D LEFT ANKLE INJURY, SUBSEQUENT ENCOUNTER: Primary | ICD-10-CM

## 2023-05-31 DIAGNOSIS — S93.402D SPRAIN OF LEFT ANKLE, UNSPECIFIED LIGAMENT, SUBSEQUENT ENCOUNTER: ICD-10-CM
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
## 2023-06-08 ENCOUNTER — OFFICE VISIT (OUTPATIENT)
Dept: ORTHOPEDIC SURGERY | Facility: CLINIC | Age: 58
End: 2023-06-08
Payer: OTHER GOVERNMENT

## 2023-06-08 VITALS — WEIGHT: 197 LBS | HEIGHT: 62 IN | BODY MASS INDEX: 36.25 KG/M2

## 2023-06-08 DIAGNOSIS — S93.402A SPRAIN OF LEFT ANKLE, UNSPECIFIED LIGAMENT, INITIAL ENCOUNTER: Primary | ICD-10-CM
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
## 2023-08-17 NOTE — TELEPHONE ENCOUNTER
Caller: Soheila Griffin CHUCKIE    Relationship: Self    Best call back number:     [phone]        Requested Prescriptions:   Requested Prescriptions     Pending Prescriptions Disp Refills    citalopram (CeleXA) 20 MG tablet 90 tablet 1     Sig: Take 1 tablet by mouth Daily.      Pharmacy where request should be sent: [id number] - [place], [place] - [address] AT [facility] [address] [place] & [place] - [phone] [facility] [phone] FX     Last office visit with prescribing clinician: 6/29/2023   Last telemedicine visit with prescribing clinician: Visit date not found   Next office visit with prescribing clinician: 9/21/2023     Additional details provided by patient: PATIENT STATES THAT SHE IS GOING OUT OF TOWN ON MONDAY SO SHE NEEDS THIS CALLED IN AS SOON AS POSSIBLE.    PATIENT IS SCHEDULED FOR A FOLLOW UP IN SEPTEMBER.     Does the patient have less than a 3 day supply:  [] Yes  [x] No    Would you like a call back once the refill request has been completed: [x] Yes [] No    If the office needs to give you a call back, can they leave a voicemail: [x] Yes [] No    Renetta Cardenas Rep   08/17/23 13:30 EDT

## 2023-08-18 RX ORDER — CITALOPRAM 20 MG/1
20 TABLET ORAL DAILY
Qty: 90 TABLET | Refills: 1 | Status: SHIPPED | OUTPATIENT
Start: 2023-08-18

## 2023-08-18 NOTE — TELEPHONE ENCOUNTER
Caller: Soheila Griffin    Relationship to patient: Self    Best call back number: [phone]    Patient is needing: PATIENT IS CALLING REGARDING ABOVE MEDICATION.     UNABLE TO WARM TRANSFER

## 2023-08-29 ENCOUNTER — OFFICE VISIT (OUTPATIENT)
Dept: ORTHOPEDIC SURGERY | Facility: CLINIC | Age: 58
End: 2023-08-29
Payer: OTHER GOVERNMENT

## 2023-08-29 VITALS
DIASTOLIC BLOOD PRESSURE: 73 MMHG | HEIGHT: 62 IN | SYSTOLIC BLOOD PRESSURE: 125 MMHG | HEART RATE: 77 BPM | WEIGHT: 195.99 LBS | OXYGEN SATURATION: 96 % | BODY MASS INDEX: 36.07 KG/M2

## 2023-08-29 DIAGNOSIS — M17.0 BILATERAL PRIMARY OSTEOARTHRITIS OF KNEE: Primary | ICD-10-CM

## 2023-08-29 PROCEDURE — 99213 OFFICE O/P EST LOW 20 MIN: CPT | Performed by: PHYSICIAN ASSISTANT

## 2023-08-29 NOTE — PROGRESS NOTES
"Chief Complaint  Pain and Follow-up of the Left Knee    Subjective          History of Present Illness      Soheila Griffin is a 58 y.o. female  presents to [facility] for     Presents for follow-up evaluation of bilateral knee pain, bilateral knee osteoarthritis.  She had evaluation by Dr. Acevedo at last visit he gave her left knee steroid injection she states the injection was very painful the first day but after that her knee felt better she states the knee is doing well today.  She states the right knee is not hurting enough to cause her to need an injection.  She states the diclofenac Dr. Acevedo gave her caused her nausea she is mainly taking naproxen or ibuprofen now.  She is happy with her current progress no new complaints      Allergies   Allergen Reactions    Diclofenac Nausea Only and Dizziness    Sulfamethoxazole-Trimethoprim Photosensitivity    Amoxicillin Rash     CHILDHOOD RX    Oxycodone-Acetaminophen Palpitations        Social History     Socioeconomic History    Marital status:    Tobacco Use    Smoking status: Never    Smokeless tobacco: Never   Vaping Use    Vaping Use: Never used   Substance and Sexual Activity    Alcohol use: Not Currently    Drug use: Never    Sexual activity: Yes     Partners: Male     Birth control/protection: Vasectomy        REVIEW OF SYSTEMS    Constitutional: Denies fevers, chills, weight loss  Cardiovascular: Denies chest pain, shortness of breath  Skin: Denies rashes, acute skin changes  Neurologic: Denies headache, loss of consciousness  MSK: Bilateral knee pain      Objective   Vital Signs:   /73   Pulse 77   Ht 157.5 cm (62\")   Wt 88.9 kg (195 lb 15.8 oz)   SpO2 96%   BMI 35.85 kg/mý     Body mass index is 35.85 kg/mý.    Physical Exam         Left knee: Skin is intact, no erythema, no ecchymosis, no swelling, no effusion, no signs of infection, full extension, flexion 120, stable anterior/posterior drawer, " stable to varus/valgus stress.  Nontender to palpation, no pain with range of motion. Negative Sona, Negative Lachman.    Right knee: Skin is intact, no erythema, no ecchymosis, no swelling, no effusion, no signs of infection, full extension, flexion 120, stable anterior/posterior drawer, stable to varus/valgus stress.  Nontender to palpation, no pain with range of motion. Negative Sona, Negative Lachman.        Procedures    Imaging Results (Most Recent)       None             Result Review :   The following data was reviewed by: RODO Dubon on 08/29/2023:               Assessment and Plan    Diagnoses and all orders for this visit:    1. Bilateral primary osteoarthritis of knee (Primary)        Discussed diagnosis and treatment options with the patient we discussed future injections she would like to follow-up in 6 weeks to consider a right/left knee steroid injection.  She may follow-up at any time for right knee injection follow-up in 6 weeks.    Call or return if worsening symptoms.    Follow Up   Return in about 6 weeks (around 10/10/2023).  Patient was given instructions and counseling regarding her condition or for health maintenance advice. Please see specific information pulled into the AVS if appropriate.

## 2023-09-19 ENCOUNTER — TELEPHONE (OUTPATIENT)
Dept: ORTHOPEDIC SURGERY | Facility: CLINIC | Age: 58
End: 2023-09-19

## 2023-09-19 NOTE — TELEPHONE ENCOUNTER
Caller: Soheila Griffin    Relationship to patient: Self    Best call back number: [phone]    Chief complaint: LEFT KNEE PAIN    Type of visit: INJECTION    Requested date: ASAP     Additional notes:YOUR IN A LOT OF PAIN AND WOULD LIKE ANOTHER INJECTION

## 2023-09-21 ENCOUNTER — OFFICE VISIT (OUTPATIENT)
Dept: INTERNAL MEDICINE | Facility: CLINIC | Age: 58
End: 2023-09-21
Payer: OTHER GOVERNMENT

## 2023-09-21 VITALS
HEART RATE: 87 BPM | HEIGHT: 62 IN | SYSTOLIC BLOOD PRESSURE: 132 MMHG | BODY MASS INDEX: 39.75 KG/M2 | OXYGEN SATURATION: 96 % | TEMPERATURE: 97.3 F | RESPIRATION RATE: 16 BRPM | WEIGHT: 216 LBS | DIASTOLIC BLOOD PRESSURE: 70 MMHG

## 2023-09-21 DIAGNOSIS — Z11.59 SCREENING FOR VIRAL DISEASE: ICD-10-CM

## 2023-09-21 DIAGNOSIS — E78.2 MIXED HYPERLIPIDEMIA: ICD-10-CM

## 2023-09-21 DIAGNOSIS — F41.1 GENERALIZED ANXIETY DISORDER: ICD-10-CM

## 2023-09-21 DIAGNOSIS — Z00.00 ANNUAL PHYSICAL EXAM: Primary | ICD-10-CM

## 2023-09-21 DIAGNOSIS — E03.9 HYPOTHYROIDISM, UNSPECIFIED TYPE: ICD-10-CM

## 2023-09-21 DIAGNOSIS — F33.1 MAJOR DEPRESSIVE DISORDER, RECURRENT EPISODE, MODERATE DEGREE: ICD-10-CM

## 2023-09-21 RX ORDER — CETIRIZINE HYDROCHLORIDE 10 MG/1
10 TABLET ORAL DAILY
Qty: 90 TABLET | Refills: 1 | Status: SHIPPED | OUTPATIENT
Start: 2023-09-21
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
## 2023-09-25 NOTE — PROGRESS NOTES
I have reviewed the notes, assessments, and/or procedures performed by Brittnee Kovacs PA-C, I concur with her documentation of Soheila Griffin.

## 2023-10-17 ENCOUNTER — OFFICE VISIT (OUTPATIENT)
Dept: ORTHOPEDIC SURGERY | Facility: CLINIC | Age: 58
End: 2023-10-17
Payer: OTHER GOVERNMENT

## 2023-10-17 VITALS
BODY MASS INDEX: 39.76 KG/M2 | OXYGEN SATURATION: 98 % | HEART RATE: 75 BPM | HEIGHT: 62 IN | WEIGHT: 216.05 LBS | SYSTOLIC BLOOD PRESSURE: 138 MMHG | DIASTOLIC BLOOD PRESSURE: 81 MMHG

## 2023-10-17 DIAGNOSIS — S83.242A ACUTE MEDIAL MENISCUS TEAR OF LEFT KNEE, INITIAL ENCOUNTER: ICD-10-CM

## 2023-10-17 DIAGNOSIS — M17.0 BILATERAL PRIMARY OSTEOARTHRITIS OF KNEE: Primary | ICD-10-CM

## 2023-10-17 RX ORDER — TRIAMCINOLONE ACETONIDE 40 MG/ML
40 INJECTION, SUSPENSION INTRA-ARTICULAR; INTRAMUSCULAR
Status: COMPLETED | OUTPATIENT
Start: 2023-10-17 | End: 2023-10-17

## 2023-10-17 RX ORDER — LIDOCAINE HYDROCHLORIDE 10 MG/ML
5 INJECTION, SOLUTION INFILTRATION; PERINEURAL
Status: COMPLETED | OUTPATIENT
Start: 2023-10-17 | End: 2023-10-17

## 2023-10-17 RX ADMIN — LIDOCAINE HYDROCHLORIDE 5 ML: 10 INJECTION, SOLUTION INFILTRATION; PERINEURAL at 09:06

## 2023-10-17 RX ADMIN — TRIAMCINOLONE ACETONIDE 40 MG: 40 INJECTION, SUSPENSION INTRA-ARTICULAR; INTRAMUSCULAR at 09:06

## 2023-10-17 NOTE — PROGRESS NOTES
"Chief Complaint  Pain and Follow-up of the Left Knee    Subjective          Pain        Soheila Griffin is a 58 y.o. female  presents to [facility] for     Patient presents for follow-up evaluation of left knee pain, left knee osteoarthritis she states her right knee continues to do well.  She states the injection she received 3 months ago has worn off.  She is was last seen on 8/29/2023 she states the injection wore off about a week later and she has had severe pain to the knee she points anterior medial lateral knee as areas of pain, she states the pain is severe enough that she may consider surgical intervention.  She takes diclofenac.  She is requesting left knee injection.      Allergies   Allergen Reactions    Diclofenac Nausea Only and Dizziness    Sulfamethoxazole-Trimethoprim Photosensitivity    Amoxicillin Rash     CHILDHOOD RX    Oxycodone-Acetaminophen Palpitations        Social History     Socioeconomic History    Marital status:    Tobacco Use    Smoking status: Never    Smokeless tobacco: Never   Vaping Use    Vaping Use: Never used   Substance and Sexual Activity    Alcohol use: Not Currently    Drug use: Never    Sexual activity: Yes     Partners: Male     Birth control/protection: Vasectomy        REVIEW OF SYSTEMS    Constitutional: Awake alert and oriented x3, no acute distress, denies fevers, chills, weight loss  Respiratory: No respiratory distress  Vascular: Brisk cap refill, Intact distal pulses, No cyanosis, compartments soft with no signs or symptoms of compartment syndrome or DVT.   Cardiovascular: Denies chest pain, shortness of breath  Skin: Denies rashes, acute skin changes  Neurologic: Denies headache, loss of consciousness  MSK: Left knee pain      Objective   Vital Signs:   /81   Pulse 75   Ht 157.5 cm (62\")   Wt 98 kg (216 lb 0.8 oz)   SpO2 98%   BMI 39.52 kg/m²     Body mass index is 39.52 kg/m².    Physical Exam       Left knee- " ROM 0-120 degrees. Stable to varus/valgus stress. Stable to anterior/posterior drawer. Negative Lachman's. Positive EHL, FHL, GS and TA. Sensation intact to all 5 nerves of the foot. Positive pulses. Negative Sona's. Tender to the medial joint line.        Large Joint Arthrocentesis: L knee  Date/Time: 10/17/2023 9:06 AM  Consent given by: patient  Site marked: site marked  Timeout: Immediately prior to procedure a time out was called to verify the correct patient, procedure, equipment, support staff and site/side marked as required   Supporting Documentation  Indications: pain   Procedure Details  Location: knee - L knee  Preparation: Patient was prepped and draped in the usual sterile fashion  Needle gauge: 21 G.  Approach: lateral  Medications administered: 5 mL lidocaine 1 %; 40 mg triamcinolone acetonide 40 MG/ML  Patient tolerance: patient tolerated the procedure well with no immediate complications        Imaging Results (Most Recent)       None             Result Review :   The following data was reviewed by: RODO Dubon on 10/17/2023:               Assessment and Plan    Diagnoses and all orders for this visit:    1. Bilateral primary osteoarthritis of knee (Primary)    Other orders  -     Large Joint Arthrocentesis: L knee        Discussed diagnosis and treatment options with the patient she elected to have left knee steroid injection today which she tolerated well, she will follow-up in 6 weeks for recheck she may consider surgical intervention and would like to meet with Dr. Acevedo for this discussion    Call or return if worsening symptoms.    Follow Up   Return in about 6 weeks (around 11/28/2023) for Recheck.  Patient was given instructions and counseling regarding her condition or for health maintenance advice. Please see specific information pulled into the AVS if appropriate.

## 2023-11-08 NOTE — TELEPHONE ENCOUNTER
Pt was supposed to get labs in Sept. Pls call her to have her make nurse visit or go to hospital to get labs done. We can get refill so she does not run out but needs labs asap

## 2023-11-09 ENCOUNTER — CLINICAL SUPPORT (OUTPATIENT)
Dept: INTERNAL MEDICINE | Facility: CLINIC | Age: 58
End: 2023-11-09
Payer: OTHER GOVERNMENT

## 2023-11-09 DIAGNOSIS — E03.9 HYPOTHYROIDISM, UNSPECIFIED TYPE: ICD-10-CM

## 2023-11-09 DIAGNOSIS — E78.2 MIXED HYPERLIPIDEMIA: Primary | ICD-10-CM

## 2023-11-09 LAB
ALBUMIN SERPL-MCNC: 4.4 G/DL (ref 3.5–5.2)
ALBUMIN/GLOB SERPL: 1.8 G/DL
ALP SERPL-CCNC: 80 U/L (ref 39–117)
ALT SERPL W P-5'-P-CCNC: 21 U/L (ref 1–33)
ANION GAP SERPL CALCULATED.3IONS-SCNC: 9 MMOL/L (ref 5–15)
AST SERPL-CCNC: 17 U/L (ref 1–32)
BASOPHILS # BLD AUTO: 0.06 10*3/MM3 (ref 0–0.2)
BASOPHILS NFR BLD AUTO: 1 % (ref 0–1.5)
BILIRUB SERPL-MCNC: 0.2 MG/DL (ref 0–1.2)
BUN SERPL-MCNC: 19 MG/DL (ref 6–20)
BUN/CREAT SERPL: 31.1 (ref 7–25)
CALCIUM SPEC-SCNC: 9.5 MG/DL (ref 8.6–10.5)
CHLORIDE SERPL-SCNC: 102 MMOL/L (ref 98–107)
CHOLEST SERPL-MCNC: 251 MG/DL (ref 0–200)
CO2 SERPL-SCNC: 26 MMOL/L (ref 22–29)
CREAT SERPL-MCNC: 0.61 MG/DL (ref 0.57–1)
DEPRECATED RDW RBC AUTO: 38.1 FL (ref 37–54)
EGFRCR SERPLBLD CKD-EPI 2021: 103.1 ML/MIN/1.73
EOSINOPHIL # BLD AUTO: 0.14 10*3/MM3 (ref 0–0.4)
EOSINOPHIL NFR BLD AUTO: 2.3 % (ref 0.3–6.2)
ERYTHROCYTE [DISTWIDTH] IN BLOOD BY AUTOMATED COUNT: 12.8 % (ref 12.3–15.4)
GLOBULIN UR ELPH-MCNC: 2.4 GM/DL
GLUCOSE SERPL-MCNC: 103 MG/DL (ref 65–99)
HCT VFR BLD AUTO: 40.1 % (ref 34–46.6)
HCV AB SER DONR QL: NORMAL
HDLC SERPL-MCNC: 55 MG/DL (ref 40–60)
HGB BLD-MCNC: 13.2 G/DL (ref 12–15.9)
IMM GRANULOCYTES # BLD AUTO: 0.02 10*3/MM3 (ref 0–0.05)
IMM GRANULOCYTES NFR BLD AUTO: 0.3 % (ref 0–0.5)
LDLC SERPL CALC-MCNC: 174 MG/DL (ref 0–100)
LDLC/HDLC SERPL: 3.12 {RATIO}
LYMPHOCYTES # BLD AUTO: 1.93 10*3/MM3 (ref 0.7–3.1)
LYMPHOCYTES NFR BLD AUTO: 31.2 % (ref 19.6–45.3)
MCH RBC QN AUTO: 26.9 PG (ref 26.6–33)
MCHC RBC AUTO-ENTMCNC: 32.9 G/DL (ref 31.5–35.7)
MCV RBC AUTO: 81.7 FL (ref 79–97)
MONOCYTES # BLD AUTO: 0.41 10*3/MM3 (ref 0.1–0.9)
MONOCYTES NFR BLD AUTO: 6.6 % (ref 5–12)
NEUTROPHILS NFR BLD AUTO: 3.62 10*3/MM3 (ref 1.7–7)
NEUTROPHILS NFR BLD AUTO: 58.6 % (ref 42.7–76)
NRBC BLD AUTO-RTO: 0 /100 WBC (ref 0–0.2)
PLATELET # BLD AUTO: 269 10*3/MM3 (ref 140–450)
PMV BLD AUTO: 9.5 FL (ref 6–12)
POTASSIUM SERPL-SCNC: 4.1 MMOL/L (ref 3.5–5.2)
PROT SERPL-MCNC: 6.8 G/DL (ref 6–8.5)
RBC # BLD AUTO: 4.91 10*6/MM3 (ref 3.77–5.28)
SODIUM SERPL-SCNC: 137 MMOL/L (ref 136–145)
T4 FREE SERPL-MCNC: 0.86 NG/DL (ref 0.93–1.7)
TRIGL SERPL-MCNC: 122 MG/DL (ref 0–150)
TSH SERPL DL<=0.05 MIU/L-ACNC: 3.09 UIU/ML (ref 0.27–4.2)
VLDLC SERPL-MCNC: 22 MG/DL (ref 5–40)
WBC NRBC COR # BLD: 6.18 10*3/MM3 (ref 3.4–10.8)

## 2023-11-09 PROCEDURE — 86803 HEPATITIS C AB TEST: CPT | Performed by: PHYSICIAN ASSISTANT

## 2023-11-09 PROCEDURE — 84439 ASSAY OF FREE THYROXINE: CPT | Performed by: PHYSICIAN ASSISTANT

## 2023-11-09 PROCEDURE — 84443 ASSAY THYROID STIM HORMONE: CPT | Performed by: PHYSICIAN ASSISTANT

## 2023-11-09 PROCEDURE — 85025 COMPLETE CBC W/AUTO DIFF WBC: CPT | Performed by: PHYSICIAN ASSISTANT

## 2023-11-09 PROCEDURE — 80053 COMPREHEN METABOLIC PANEL: CPT | Performed by: PHYSICIAN ASSISTANT

## 2023-11-09 PROCEDURE — 80061 LIPID PANEL: CPT | Performed by: PHYSICIAN ASSISTANT

## 2023-11-09 PROCEDURE — 36415 COLL VENOUS BLD VENIPUNCTURE: CPT | Performed by: PHYSICIAN ASSISTANT

## 2023-11-09 RX ORDER — LEVOTHYROXINE SODIUM 25 MCG
25 TABLET ORAL DAILY
Qty: 90 TABLET | Refills: 1 | Status: SHIPPED | OUTPATIENT
Start: 2023-11-09 | End: 2023-11-11 | Stop reason: SDUPTHER

## 2023-11-09 RX ORDER — CITALOPRAM 20 MG/1
20 TABLET ORAL DAILY
Qty: 90 TABLET | Refills: 1 | Status: SHIPPED | OUTPATIENT
Start: 2023-11-09

## 2023-11-09 RX ORDER — CETIRIZINE HYDROCHLORIDE 10 MG/1
10 TABLET ORAL DAILY
Qty: 90 TABLET | Refills: 1 | Status: SHIPPED | OUTPATIENT
Start: 2023-11-09

## 2023-11-09 NOTE — TELEPHONE ENCOUNTER
Patient in office this morning for labs; asked about refill for thyroid medication, cetirizine, and citalopram.  Refills sent to [facility] per patient request.

## 2023-11-09 NOTE — PROGRESS NOTES
Venipuncture Blood Specimen Collection  Venipuncture performed in [facility] by Mana Alba RN with good hemostasis. Patient tolerated the procedure well without complications.   11/09/23   Mana Alba RN

## 2023-11-13 RX ORDER — LEVOTHYROXINE SODIUM 25 MCG
25 TABLET ORAL DAILY
Qty: 90 TABLET | Refills: 1 | Status: SHIPPED | OUTPATIENT
Start: 2023-11-13

## 2023-11-14 ENCOUNTER — TELEPHONE (OUTPATIENT)
Dept: INTERNAL MEDICINE | Facility: CLINIC | Age: 58
End: 2023-11-14
Payer: OTHER GOVERNMENT

## 2023-11-14 NOTE — TELEPHONE ENCOUNTER
Talked with pt, she stated her insurance won't pay for synthroid has to be levothyroxine, can we switch it?

## 2023-11-21 NOTE — PROGRESS NOTES
"Well Woman Visit    CC: Scheduled annual well gyn visit  Chief Complaint   Patient presents with    Annual Exam       Myriad intake in the past?: No        Post menopausal, using no HRT  PDF Report (2023 14:23)  HPI:   58 y.o.       PCP: does manage PMHx and preventative labs  History: PMHx, Meds, Allergies, PSHx, Social Hx, and POBHx all reviewed and updated.        PHYSICAL EXAM:  /70   Ht 157.5 cm (62\")   Wt 104 kg (229 lb)   BMI 41.88 kg/m²  Not found.  General- NAD, alert and oriented, appropriate  Psych- Normal mood, good memory  Neck- No masses, no thyroid enlargement  CV- Regular rhythm, no murnurs  Resp- CTA to bases, no wheezes  Abdomen- Soft, non distended, non tender, no masses    Breast left-  Bilaterally symmetrical, no masses, non tender, no nipple discharge  Breast right- Bilaterally symmetrical, no masses, non tender, no nipple discharge    External genitalia-moderate vulvovaginal atrophy previous changes in vulvar architecture secondary to lichen sclerosis.  There is complete resorption bilateral labia minora.  There is no current thickening white skin or excoriations on exam  Urethra/meatus- Normal, no masses, non tender  Bladder- Normal, no masses, non tender  Vagina- Normal, moderate to severe atrophy, no lesions, no discharge.    Cvx- Normal, no lesions, no discharge, No cervical motion tenderness  Uterus- Normal size, shape & consistency.  Non tender, mobile, & no prolapse  Adnexa- No mass, non tender  Anus/Rectum/Perineum- Not performed    Lymphatic- No palpable neck, axillary, or groin nodes  Ext- No edema, no cyanosis    Skin- No lesions, no rashes, no acanthosis nigricans      ASSESSMENT and PLAN:    Diagnoses and all orders for this visit:    1. Well woman exam with routine gynecological exam (Primary)    2. Lichen sclerosus of female genitalia  Assessment & Plan:  Symptoms are well controlled with clobetasol PRN  There is no active disease on exam    Orders:  -     " clobetasol (TEMOVATE) 0.05 % ointment; Apply 1 application  topically to the appropriate area as directed 3 (Three) Times a Week.  Dispense: 60 g; Refill: 3    3. Genitourinary syndrome of menopause  Assessment & Plan:  Patient is currently asymptomatic and is not interested in treatment      4. Encounter for screening mammogram for malignant neoplasm of breast  -     Mammo Screening Digital Tomosynthesis Bilateral With CAD; Future        Preventative:  BREAST HEALTH- Monthly self breast exam importance and how to reviewed. MMG and/or MRI (prn) reviewed per society guidelines and her individual history. Screen: Pt will call to schedule  CERVICAL CANCER Screening- Reviewed current ASCCP guidelines for screening w and wo cotest HPV, age specific.  Screen: Already up to date  COLON CANCER Screening- Reviewed current medical society guidelines and options.  Screen:  Already up to date  Follow up PCP/Specialist PMHx and/or Labs      She understands the importance of having any ordered tests to be performed in a timely fashion.  The risks of not performing them include, but are not limited to, advanced cancer stages, bone loss from osteoporosis and/or subsequent increase in morbidity and/or mortality.  She is encouraged to review her results online and/or contact or office if she has questions.     Follow Up:  No follow-ups on file.            Gianna Weber MD  11/27/2023    [facility]  [address] [place] [address]  Dept: [phone]  Dept Fax: [phone]  Loc: [phone]  Loc Fax: [phone]

## 2023-11-27 ENCOUNTER — OFFICE VISIT (OUTPATIENT)
Dept: OBSTETRICS AND GYNECOLOGY | Facility: CLINIC | Age: 58
End: 2023-11-27
Payer: OTHER GOVERNMENT

## 2023-11-27 VITALS
DIASTOLIC BLOOD PRESSURE: 70 MMHG | HEIGHT: 62 IN | WEIGHT: 229 LBS | SYSTOLIC BLOOD PRESSURE: 122 MMHG | BODY MASS INDEX: 42.14 KG/M2

## 2023-11-27 DIAGNOSIS — N90.4 LICHEN SCLEROSUS OF FEMALE GENITALIA: ICD-10-CM

## 2023-11-27 DIAGNOSIS — N95.8 GENITOURINARY SYNDROME OF MENOPAUSE: ICD-10-CM

## 2023-11-27 DIAGNOSIS — Z01.419 WELL WOMAN EXAM WITH ROUTINE GYNECOLOGICAL EXAM: Primary | ICD-10-CM

## 2023-11-27 DIAGNOSIS — Z12.31 ENCOUNTER FOR SCREENING MAMMOGRAM FOR MALIGNANT NEOPLASM OF BREAST: ICD-10-CM

## 2023-11-27 RX ORDER — TRAMADOL HYDROCHLORIDE 50 MG/1
TABLET ORAL
COMMUNITY
Start: 2023-10-23

## 2023-11-28 ENCOUNTER — PREP FOR SURGERY (OUTPATIENT)
Dept: OTHER | Facility: HOSPITAL | Age: 58
End: 2023-11-28
Payer: OTHER GOVERNMENT

## 2023-11-28 ENCOUNTER — OFFICE VISIT (OUTPATIENT)
Dept: ORTHOPEDIC SURGERY | Facility: CLINIC | Age: 58
End: 2023-11-28
Payer: OTHER GOVERNMENT

## 2023-11-28 VITALS
SYSTOLIC BLOOD PRESSURE: 132 MMHG | DIASTOLIC BLOOD PRESSURE: 62 MMHG | WEIGHT: 229 LBS | HEIGHT: 62 IN | OXYGEN SATURATION: 96 % | HEART RATE: 69 BPM | BODY MASS INDEX: 42.14 KG/M2

## 2023-11-28 DIAGNOSIS — M17.12 PRIMARY OSTEOARTHRITIS OF LEFT KNEE: ICD-10-CM

## 2023-11-28 DIAGNOSIS — S83.242A ACUTE MEDIAL MENISCUS TEAR OF LEFT KNEE, INITIAL ENCOUNTER: Primary | ICD-10-CM

## 2023-11-28 PROBLEM — S83.207A TEAR OF MENISCUS OF LEFT KNEE AS CURRENT INJURY, UNSPECIFIED MENISCUS, UNSPECIFIED TEAR TYPE, INITIAL ENCOUNTER: Status: ACTIVE | Noted: 2023-11-28

## 2023-11-28 PROCEDURE — 99214 OFFICE O/P EST MOD 30 MIN: CPT | Performed by: ORTHOPAEDIC SURGERY

## 2023-11-28 RX ORDER — CEFAZOLIN SODIUM 2 G/100ML
2 INJECTION, SOLUTION INTRAVENOUS ONCE
OUTPATIENT
Start: 2023-11-28 | End: 2023-11-28

## 2023-11-28 NOTE — H&P (VIEW-ONLY)
"Chief Complaint  Pain and Follow-up of the Left Knee     Subjective      Soheila Griffin presents to [facility] for follow up evaluation of the left knee. The patient has been treating her left knee osteoarthritis and MMT conservatively. She reports injections in the past that are no longer providing lasting relief. She reports they only last about 3 weeks. She locates pain to the anterior medial knee.     Allergies   Allergen Reactions    Diclofenac Nausea Only and Dizziness    Sulfamethoxazole-Trimethoprim Photosensitivity    Amoxicillin Rash     CHILDHOOD RX    Oxycodone-Acetaminophen Palpitations        Social History     Socioeconomic History    Marital status:    Tobacco Use    Smoking status: Never    Smokeless tobacco: Never   Vaping Use    Vaping Use: Never used   Substance and Sexual Activity    Alcohol use: Not Currently    Drug use: Never    Sexual activity: Yes     Partners: Male     Birth control/protection: Vasectomy        I reviewed the patient's chief complaint, history of present illness, review of systems, past medical history, surgical history, family history, social history, medications, and allergy list.     Review of Systems     Constitutional: Denies fevers, chills, weight loss  Cardiovascular: Denies chest pain, shortness of breath  Skin: Denies rashes, acute skin changes  Neurologic: Denies headache, loss of consciousness  MSK: Left knee pain      Vital Signs:   /62   Pulse 69   Ht 157.5 cm (62\")   Wt 104 kg (229 lb)   SpO2 96%   BMI 41.88 kg/m²          Physical Exam  General: Alert. No acute distress    Ortho Exam      Left knee- ROM -5 to 120 degrees. Stable to varus/valgus stress. Stable to anterior/posterior drawer. Negative Lachman's.. pain with Sona's. Positive EHL, FHL, GS and TA. Sensation intact to all 5 nerves of the foot. Positive pulses. Skin intact.     Procedures        Imaging Results (Most Recent)       None         "     Result Review :       No results found.           Assessment and Plan     Diagnoses and all orders for this visit:    1. Acute medial meniscus tear of left knee, initial encounter (Primary)    2. Primary osteoarthritis of left knee        Discussed the treatment options with the patient, operative vs non-operative. Discussed the risks and benefits of a left knee arthroscopic partial medial menisectomy and chondroplasty. The patient expressed understanding and wished to proceed.     Discussed surgery., Risks/benefits discussed with patient including, but not limited to: infection, bleeding, neurovascular damage, re-rupture, aesthetic deformity, need for further surgery, and death., Discussed with patient the implant type being used during surgery and patient understands., Surgery pamphlet given., Call or return if worsening symptoms., DME order for a 3 in 1 given today due to patient will be confined to one room/level of the home that does not offer a toilet during postop recovery. , I am requesting authorization for the violette® System to reduce the patient's pain and aid in their recovery. I believe the violette® Sport System will have positive impact on my patient's quality of life. I would appreciate prompt review of the information and authorization of the violette® System.  violette® Sport utilizes ultrasonic waves that penetrate 5 cm into the tissue, which increases circulation, oxygen and nutrient delivery, and the removal of waste products, such as lactic acid, from the site of the musculoskeletal injury.  The violette® System is a new FDA approved (FDA 510K 074313) treatment modality that that has been shown in recent clinical trials sponsored by the NIH (National Institutes of Health), the DOD (Department of Defense) and [employer] the research arm of the NASA (National Aeronautics and Space Administration) to reduce patient's pain, increase mobility and speed recovery. , and I am ordering the use of the Nice1 cold therapy machine  for 60 days post-op as part of an opioid-sparing approach to help manage pain and edema.  I feel this is medically necessary for the best care for this patient.       Follow Up     2 weeks postoperatively.        Patient was given instructions and counseling regarding her condition or for health maintenance advice. Please see specific information pulled into the AVS if appropriate.     Scribed for Hardeep Acevedo MD by Darshana Zapata.  11/28/23   09:27 EST    I have personally performed the services described in this document as scribed by the above individual and it is both accurate and complete. Hardeep Acevedo MD 11/28/23

## 2023-11-28 NOTE — PROGRESS NOTES
"Chief Complaint  Pain and Follow-up of the Left Knee     Subjective      Soheila Griffin presents to [facility] for follow up evaluation of the left knee. The patient has been treating her left knee osteoarthritis and MMT conservatively. She reports injections in the past that are no longer providing lasting relief. She reports they only last about 3 weeks. She locates pain to the anterior medial knee.     Allergies   Allergen Reactions    Diclofenac Nausea Only and Dizziness    Sulfamethoxazole-Trimethoprim Photosensitivity    Amoxicillin Rash     CHILDHOOD RX    Oxycodone-Acetaminophen Palpitations        Social History     Socioeconomic History    Marital status:    Tobacco Use    Smoking status: Never    Smokeless tobacco: Never   Vaping Use    Vaping Use: Never used   Substance and Sexual Activity    Alcohol use: Not Currently    Drug use: Never    Sexual activity: Yes     Partners: Male     Birth control/protection: Vasectomy        I reviewed the patient's chief complaint, history of present illness, review of systems, past medical history, surgical history, family history, social history, medications, and allergy list.     Review of Systems     Constitutional: Denies fevers, chills, weight loss  Cardiovascular: Denies chest pain, shortness of breath  Skin: Denies rashes, acute skin changes  Neurologic: Denies headache, loss of consciousness  MSK: Left knee pain      Vital Signs:   /62   Pulse 69   Ht 157.5 cm (62\")   Wt 104 kg (229 lb)   SpO2 96%   BMI 41.88 kg/m²          Physical Exam  General: Alert. No acute distress    Ortho Exam      Left knee- ROM -5 to 120 degrees. Stable to varus/valgus stress. Stable to anterior/posterior drawer. Negative Lachman's.. pain with Sona's. Positive EHL, FHL, GS and TA. Sensation intact to all 5 nerves of the foot. Positive pulses. Skin intact.     Procedures        Imaging Results (Most Recent)       None         "     Result Review :       No results found.           Assessment and Plan     Diagnoses and all orders for this visit:    1. Acute medial meniscus tear of left knee, initial encounter (Primary)    2. Primary osteoarthritis of left knee        Discussed the treatment options with the patient, operative vs non-operative. Discussed the risks and benefits of a left knee arthroscopic partial medial menisectomy and chondroplasty. The patient expressed understanding and wished to proceed.     Discussed surgery., Risks/benefits discussed with patient including, but not limited to: infection, bleeding, neurovascular damage, re-rupture, aesthetic deformity, need for further surgery, and death., Discussed with patient the implant type being used during surgery and patient understands., Surgery pamphlet given., Call or return if worsening symptoms., DME order for a 3 in 1 given today due to patient will be confined to one room/level of the home that does not offer a toilet during postop recovery. , I am requesting authorization for the violette® System to reduce the patient's pain and aid in their recovery. I believe the violette® Sport System will have positive impact on my patient's quality of life. I would appreciate prompt review of the information and authorization of the violette® System.  violette® Sport utilizes ultrasonic waves that penetrate 5 cm into the tissue, which increases circulation, oxygen and nutrient delivery, and the removal of waste products, such as lactic acid, from the site of the musculoskeletal injury.  The violette® System is a new FDA approved (FDA 510K 841977) treatment modality that that has been shown in recent clinical trials sponsored by the NIH (National Institutes of Health), the DOD (Department of Defense) and [employer] the research arm of the NASA (National Aeronautics and Space Administration) to reduce patient's pain, increase mobility and speed recovery. , and I am ordering the use of the Nice1 cold therapy machine  for 60 days post-op as part of an opioid-sparing approach to help manage pain and edema.  I feel this is medically necessary for the best care for this patient.       Follow Up     2 weeks postoperatively.        Patient was given instructions and counseling regarding her condition or for health maintenance advice. Please see specific information pulled into the AVS if appropriate.     Scribed for Hardeep Acevedo MD by Darshana Zapata.  11/28/23   09:27 EST    I have personally performed the services described in this document as scribed by the above individual and it is both accurate and complete. Hardeep Acevedo MD 11/28/23

## 2023-12-01 RX ORDER — CLOBETASOL PROPIONATE 0.5 MG/G
1 OINTMENT TOPICAL 3 TIMES WEEKLY
Qty: 60 G | Refills: 3 | Status: SHIPPED | OUTPATIENT
Start: 2023-12-01

## 2023-12-11 ENCOUNTER — HOSPITAL ENCOUNTER (OUTPATIENT)
Facility: HOSPITAL | Age: 58
Discharge: HOME | End: 2023-12-11
Attending: ORTHOPAEDIC SURGERY | Admitting: ORTHOPAEDIC SURGERY
Payer: OTHER GOVERNMENT

## 2023-12-11 ENCOUNTER — ANESTHESIA EVENT (OUTPATIENT)
Dept: PERIOP | Facility: HOSPITAL | Age: 58
End: 2023-12-11
Payer: OTHER GOVERNMENT

## 2023-12-11 ENCOUNTER — ANESTHESIA (OUTPATIENT)
Dept: PERIOP | Facility: HOSPITAL | Age: 58
End: 2023-12-11
Payer: OTHER GOVERNMENT

## 2023-12-11 VITALS
WEIGHT: 227.07 LBS | HEIGHT: 62 IN | TEMPERATURE: 98 F | HEART RATE: 70 BPM | OXYGEN SATURATION: 97 % | RESPIRATION RATE: 18 BRPM | BODY MASS INDEX: 41.79 KG/M2 | SYSTOLIC BLOOD PRESSURE: 133 MMHG | DIASTOLIC BLOOD PRESSURE: 69 MMHG

## 2023-12-11 DIAGNOSIS — S83.242A ACUTE MEDIAL MENISCUS TEAR OF LEFT KNEE, INITIAL ENCOUNTER: ICD-10-CM

## 2023-12-11 LAB
QT INTERVAL: 425 MS
QTC INTERVAL: 443 MS

## 2023-12-11 PROCEDURE — 25010000002 MORPHINE SULFATE 10 MG/ML SOLUTION: Performed by: ORTHOPAEDIC SURGERY

## 2023-12-11 PROCEDURE — 25010000002 MIDAZOLAM PER 1MG: Performed by: ANESTHESIOLOGY

## 2023-12-11 PROCEDURE — 93005 ELECTROCARDIOGRAM TRACING: CPT | Performed by: ANESTHESIOLOGY

## 2023-12-11 PROCEDURE — 25010000002 CEFAZOLIN IN DEXTROSE 2000 MG/ 100 ML SOLUTION: Performed by: ORTHOPAEDIC SURGERY

## 2023-12-11 PROCEDURE — 25010000002 DEXAMETHASONE PER 1 MG: Performed by: NURSE ANESTHETIST, CERTIFIED REGISTERED

## 2023-12-11 PROCEDURE — 25010000002 PROPOFOL 10 MG/ML EMULSION: Performed by: NURSE ANESTHETIST, CERTIFIED REGISTERED

## 2023-12-11 PROCEDURE — 29881 ARTHRS KNE SRG MNISECTMY M/L: CPT | Performed by: ORTHOPAEDIC SURGERY

## 2023-12-11 PROCEDURE — 25010000002 SUGAMMADEX 200 MG/2ML SOLUTION: Performed by: NURSE ANESTHETIST, CERTIFIED REGISTERED

## 2023-12-11 PROCEDURE — 25010000002 BUPIVACAINE (PF) 0.5 % SOLUTION: Performed by: ORTHOPAEDIC SURGERY

## 2023-12-11 PROCEDURE — 25010000002 ONDANSETRON PER 1 MG: Performed by: NURSE ANESTHETIST, CERTIFIED REGISTERED

## 2023-12-11 PROCEDURE — 25810000003 LACTATED RINGERS PER 1000 ML: Performed by: ANESTHESIOLOGY

## 2023-12-11 PROCEDURE — 25010000002 FENTANYL CITRATE (PF) 50 MCG/ML SOLUTION: Performed by: NURSE ANESTHETIST, CERTIFIED REGISTERED

## 2023-12-11 RX ORDER — CEFAZOLIN SODIUM 2 G/100ML
2 INJECTION, SOLUTION INTRAVENOUS ONCE
Status: COMPLETED | OUTPATIENT
Start: 2023-12-11 | End: 2023-12-11

## 2023-12-11 RX ORDER — ROCURONIUM BROMIDE 10 MG/ML
INJECTION, SOLUTION INTRAVENOUS AS NEEDED
Status: DISCONTINUED | OUTPATIENT
Start: 2023-12-11 | End: 2023-12-11 | Stop reason: SURG

## 2023-12-11 RX ORDER — SCOLOPAMINE TRANSDERMAL SYSTEM 1 MG/1
1 PATCH, EXTENDED RELEASE TRANSDERMAL ONCE
Status: DISCONTINUED | OUTPATIENT
Start: 2023-12-11 | End: 2023-12-11 | Stop reason: HOSPADM

## 2023-12-11 RX ORDER — ACETAMINOPHEN 500 MG
1000 TABLET ORAL ONCE
Status: COMPLETED | OUTPATIENT
Start: 2023-12-11 | End: 2023-12-11

## 2023-12-11 RX ORDER — PROMETHAZINE HYDROCHLORIDE 12.5 MG/1
25 TABLET ORAL ONCE AS NEEDED
Status: DISCONTINUED | OUTPATIENT
Start: 2023-12-11 | End: 2023-12-11 | Stop reason: HOSPADM

## 2023-12-11 RX ORDER — MORPHINE SULFATE 10 MG/ML
INJECTION INTRAVENOUS AS NEEDED
Status: DISCONTINUED | OUTPATIENT
Start: 2023-12-11 | End: 2023-12-11 | Stop reason: HOSPADM

## 2023-12-11 RX ORDER — PROMETHAZINE HYDROCHLORIDE 25 MG/1
25 SUPPOSITORY RECTAL ONCE AS NEEDED
Status: DISCONTINUED | OUTPATIENT
Start: 2023-12-11 | End: 2023-12-11 | Stop reason: HOSPADM

## 2023-12-11 RX ORDER — MIDAZOLAM HYDROCHLORIDE 2 MG/2ML
2 INJECTION, SOLUTION INTRAMUSCULAR; INTRAVENOUS ONCE
Status: COMPLETED | OUTPATIENT
Start: 2023-12-11 | End: 2023-12-11

## 2023-12-11 RX ORDER — ASPIRIN 325 MG
325 TABLET, DELAYED RELEASE (ENTERIC COATED) ORAL DAILY
Qty: 14 TABLET | Refills: 0 | Status: SHIPPED | OUTPATIENT
Start: 2023-12-11

## 2023-12-11 RX ORDER — LIDOCAINE HYDROCHLORIDE 20 MG/ML
INJECTION, SOLUTION EPIDURAL; INFILTRATION; INTRACAUDAL; PERINEURAL AS NEEDED
Status: DISCONTINUED | OUTPATIENT
Start: 2023-12-11 | End: 2023-12-11 | Stop reason: SURG

## 2023-12-11 RX ORDER — MEPERIDINE HYDROCHLORIDE 25 MG/ML
12.5 INJECTION INTRAMUSCULAR; INTRAVENOUS; SUBCUTANEOUS
Status: DISCONTINUED | OUTPATIENT
Start: 2023-12-11 | End: 2023-12-11 | Stop reason: HOSPADM

## 2023-12-11 RX ORDER — BUPIVACAINE HYDROCHLORIDE 5 MG/ML
INJECTION, SOLUTION EPIDURAL; INTRACAUDAL AS NEEDED
Status: DISCONTINUED | OUTPATIENT
Start: 2023-12-11 | End: 2023-12-11 | Stop reason: HOSPADM

## 2023-12-11 RX ORDER — HYDROCODONE BITARTRATE AND ACETAMINOPHEN 7.5; 325 MG/1; MG/1
1 TABLET ORAL EVERY 4 HOURS PRN
Qty: 30 TABLET | Refills: 0 | Status: SHIPPED | OUTPATIENT
Start: 2023-12-11

## 2023-12-11 RX ORDER — DEXAMETHASONE SODIUM PHOSPHATE 4 MG/ML
INJECTION, SOLUTION INTRA-ARTICULAR; INTRALESIONAL; INTRAMUSCULAR; INTRAVENOUS; SOFT TISSUE AS NEEDED
Status: DISCONTINUED | OUTPATIENT
Start: 2023-12-11 | End: 2023-12-11 | Stop reason: SURG

## 2023-12-11 RX ORDER — SODIUM CHLORIDE, SODIUM LACTATE, POTASSIUM CHLORIDE, CALCIUM CHLORIDE 600; 310; 30; 20 MG/100ML; MG/100ML; MG/100ML; MG/100ML
9 INJECTION, SOLUTION INTRAVENOUS CONTINUOUS PRN
Status: DISCONTINUED | OUTPATIENT
Start: 2023-12-11 | End: 2023-12-11 | Stop reason: HOSPADM

## 2023-12-11 RX ORDER — PROPOFOL 10 MG/ML
VIAL (ML) INTRAVENOUS AS NEEDED
Status: DISCONTINUED | OUTPATIENT
Start: 2023-12-11 | End: 2023-12-11 | Stop reason: SURG

## 2023-12-11 RX ORDER — OXYCODONE HYDROCHLORIDE 5 MG/1
5 TABLET ORAL
Status: DISCONTINUED | OUTPATIENT
Start: 2023-12-11 | End: 2023-12-11 | Stop reason: HOSPADM

## 2023-12-11 RX ORDER — FENTANYL CITRATE 50 UG/ML
INJECTION, SOLUTION INTRAMUSCULAR; INTRAVENOUS AS NEEDED
Status: DISCONTINUED | OUTPATIENT
Start: 2023-12-11 | End: 2023-12-11 | Stop reason: SURG

## 2023-12-11 RX ORDER — ONDANSETRON 2 MG/ML
INJECTION INTRAMUSCULAR; INTRAVENOUS AS NEEDED
Status: DISCONTINUED | OUTPATIENT
Start: 2023-12-11 | End: 2023-12-11 | Stop reason: SURG

## 2023-12-11 RX ORDER — SODIUM CHLORIDE 9 MG/ML
40 INJECTION, SOLUTION INTRAVENOUS AS NEEDED
Status: DISCONTINUED | OUTPATIENT
Start: 2023-12-11 | End: 2023-12-11 | Stop reason: HOSPADM

## 2023-12-11 RX ORDER — ONDANSETRON 2 MG/ML
4 INJECTION INTRAMUSCULAR; INTRAVENOUS ONCE AS NEEDED
Status: DISCONTINUED | OUTPATIENT
Start: 2023-12-11 | End: 2023-12-11 | Stop reason: HOSPADM

## 2023-12-11 RX ORDER — EPHEDRINE SULFATE 50 MG/ML
INJECTION, SOLUTION INTRAVENOUS AS NEEDED
Status: DISCONTINUED | OUTPATIENT
Start: 2023-12-11 | End: 2023-12-11 | Stop reason: SURG

## 2023-12-11 RX ADMIN — EPHEDRINE SULFATE 10 MG: 50 INJECTION INTRAVENOUS at 09:06

## 2023-12-11 RX ADMIN — MIDAZOLAM HYDROCHLORIDE 2 MG: 1 INJECTION, SOLUTION INTRAMUSCULAR; INTRAVENOUS at 08:27

## 2023-12-11 RX ADMIN — ONDANSETRON 4 MG: 2 INJECTION INTRAMUSCULAR; INTRAVENOUS at 09:07

## 2023-12-11 RX ADMIN — PROPOFOL 200 MG: 10 INJECTION, EMULSION INTRAVENOUS at 08:45

## 2023-12-11 RX ADMIN — ACETAMINOPHEN 1000 MG: 500 TABLET ORAL at 07:13

## 2023-12-11 RX ADMIN — DEXAMETHASONE SODIUM PHOSPHATE 4 MG: 4 INJECTION, SOLUTION INTRAMUSCULAR; INTRAVENOUS at 09:07

## 2023-12-11 RX ADMIN — CEFAZOLIN SODIUM 2 G: 2 INJECTION, SOLUTION INTRAVENOUS at 08:42

## 2023-12-11 RX ADMIN — PROPOFOL 100 MG: 10 INJECTION, EMULSION INTRAVENOUS at 08:47

## 2023-12-11 RX ADMIN — ROCURONIUM BROMIDE 50 MG: 50 INJECTION INTRAVENOUS at 08:45

## 2023-12-11 RX ADMIN — LIDOCAINE HYDROCHLORIDE 100 MG: 20 INJECTION, SOLUTION EPIDURAL; INFILTRATION; INTRACAUDAL; PERINEURAL at 08:45

## 2023-12-11 RX ADMIN — SUGAMMADEX 200 MG: 100 INJECTION, SOLUTION INTRAVENOUS at 09:19

## 2023-12-11 RX ADMIN — SODIUM CHLORIDE, POTASSIUM CHLORIDE, SODIUM LACTATE AND CALCIUM CHLORIDE 9 ML/HR: 600; 310; 30; 20 INJECTION, SOLUTION INTRAVENOUS at 07:21

## 2023-12-11 RX ADMIN — FENTANYL CITRATE 100 MCG: 50 INJECTION, SOLUTION INTRAMUSCULAR; INTRAVENOUS at 08:45

## 2023-12-11 NOTE — ANESTHESIA POSTPROCEDURE EVALUATION
Patient: Soheila Griffin    Procedure Summary       Date: 12/11/23 Room / Location: [facility]  / [facility]    Anesthesia Start: 0842 Anesthesia Stop: 0932    Procedure: LEFT KNEE ARTHROSCOPY WITH PARTIAL MEDIAL MENISCECTOMY CHONDROPLASTY (Left: Knee) Diagnosis:       Acute medial meniscus tear of left knee, initial encounter      (Acute medial meniscus tear of left knee, initial encounter [S83.242A])    Surgeons: Hardeep Acevedo MD Provider: Amilcar Singh MD    Anesthesia Type: general ASA Status: 2            Anesthesia Type: general    Vitals  Vitals Value Taken Time   /61 12/11/23 0941   Temp     Pulse 83 12/11/23 0944   Resp     SpO2 95 % 12/11/23 0944   Vitals shown include unfiled device data.        Post Anesthesia Care and Evaluation    Patient location during evaluation: bedside  Patient participation: complete - patient participated  Level of consciousness: awake  Pain management: adequate    Airway patency: patent  PONV Status: none  Cardiovascular status: acceptable  Respiratory status: acceptable  Hydration status: acceptable    Comments: An Anesthesiologist personally participated in the most demanding procedures (including induction and emergence if applicable) in the anesthesia plan, monitored the course of anesthesia administration at frequent intervals and remained physically present and available for immediate diagnosis and treatment of emergencies.

## 2023-12-11 NOTE — ANESTHESIA PREPROCEDURE EVALUATION
Anesthesia Evaluation     Patient summary reviewed and Nursing notes reviewed                Airway   Mallampati: I  TM distance: >3 FB  Neck ROM: full  Possible difficult intubation and Large neck circumference  Dental      Pulmonary - negative pulmonary ROS and normal exam    breath sounds clear to auscultation  Cardiovascular - normal exam    Rhythm: regular  Rate: normal    (+) hyperlipidemia      Neuro/Psych  (+) psychiatric history Anxiety and Depression  GI/Hepatic/Renal/Endo    (+) morbid obesity, thyroid problem hypothyroidism    Musculoskeletal     Abdominal    Substance History - negative use     OB/GYN negative ob/gyn ROS         Other   arthritis,                 Anesthesia Plan    ASA 2     general     intravenous induction     Anesthetic plan, risks, benefits, and alternatives have been provided, discussed and informed consent has been obtained with: patient.    CODE STATUS:

## 2023-12-11 NOTE — OP NOTE
KNEE ARTHROSCOPY  Procedure Report    Patient Name:  Soheila Griffin  YOB: 1965    Date of Surgery:  12/11/2023     Indications: The patient has failed conservative treatment and wishes to proceed with operative treatment.  We discussed the risk of surgery including bleeding, infection, damage to neurovascular structures, anesthesia complications, continued pain and disability, need for additional procedures among others.  Informed consent was obtained and they wished to proceed.    Pre-op Diagnosis:   Acute medial meniscus tear of left knee, initial encounter [S83.242A]       Post-Op Diagnosis Codes:     * Acute medial meniscus tear of left knee, initial encounter [S83.242A]  Left knee chondromalacia    Procedure/CPT® Codes:      Procedure(s):  LEFT KNEE ARTHROSCOPY WITH PARTIAL MEDIAL MENISCECTOMY CHONDROPLASTY    Staff:  Surgeon(s):  Hardeep Acevedo MD         Anesthesia: General    Estimated Blood Loss: 5 mL    Implants:    Nothing was implanted during the procedure    Specimen:          None        Findings: Chondromalacia of the patella and medial compartment, medial meniscus tear    Complications: None    Description of Procedure: Operative site was marked in the preoperative holding area.  The patient was brought to the operating room and placed supine on the operating room table.  General anesthesia was given.  All bony prominences were padded.  The operative leg had a nonsterile tourniquet placed on the thigh and was placed in arthroscopic leg jensen.  The opposite leg was placed in a padded leg jensen and the foot of the bed was lowered.      The patient received preoperative antibiotic.  After formal timeout was held, Esmarch was used to exsanguinate the limb and tourniquet was inflated.  A stab incision was made over the anterolateral aspect of the knee and a trocar was inserted into the knee.  The knee was extended, and diagnostic arthroscopy was performed. Anteromedial portal  was made with the spinal needle from outside in.  A stab incision was made.  An arthroscopic shaver was inserted into the knee and the knee was débrided.      Patellofemoral inspection revealed chondromalacia of the patellofemoral joint.  There was grade II-III chondromalacia of the central trochlear region.  This was debrided with a motorized shaver for a chondroplasty.  No full-thickness cartilage loss was noted.  The cartilage over the trochlea was intact.  There is no loose bodies within the medial lateral gutters.  No significant synovitis.     The knee was flexed.  The valgus stress was placed to the knee.  A probe was inserted into the medial compartment.  Medial compartment exam revealed a flap complex tear to the posterior horn and body of the medial meniscus.  There is a flap tear that was displaced into the joint.  This was debrided with a straight meniscal biter as well as the motorized shaver for a partial medial meniscectomy.  The meniscus was trimmed with the straight biter as well as the shaver to reshape the meniscus.  Around 4 to 5 mm of meniscus was removed.  There is also chondromalacia along the medial femoral condyle which was grade 2-3 in nature.  No full-thickness cartilage loss was noted.  This was debrided with a motorized shaver for a chondroplasty of the femoral condyle.     At this point attention was turned to the notch in the femur.  The ACL was probed and found to be stable. The PCL was intact.  The ACL was degenerative with some partial tearing of the fibers no full-thickness or high-grade tearing was noted.  The ACL was debrided with a motorized shaver.     The lateral compartment findings included intact lateral meniscus and intact lateral tibiofemoral cartilage.    At this point then fluid was drained from the knee.  Tourniquet was released.  The incisions were closed with 3-0 nylon in figure-of-8 fashion.  Local anesthetic, 10 mL of Marcaine and 10 mg of morphine, were injected  into the knee.  Xeroform, 4x4s, soft roll and an Ace wrap were placed.  The patient awoke from anesthesia in stable condition.  There were no complications.  All counts were correct.  The patient was stable to recovery.           Hardeep Acevedo MD     Date: 12/11/2023  Time: 09:25 EST

## 2023-12-11 NOTE — DISCHARGE INSTRUCTIONS
DISCHARGE INSTRUCTIONS  POST-OPERATIVE  Knee Arthroscopic Surgery      For your surgery you had:  General anesthesia (you may have a sore throat for the first 24 hours)  You received an anesthesia medication today that can cause hormonal forms of birth control to be ineffective. You should use a different form of birth control (to prevent pregnancy) for 7 days.   IV sedation.  Local anesthesia  Monitored anesthesia care  You may experience dizziness, drowsiness, or light-headedness for several hours following surgery/procedure.  Do not stay alone today or tonight.  Limit your activity for 24 hours.  Resume your diet slowly.  Follow whatever special dietary instructions you may have been given by your doctor.  You should not drive or operate machinery or drink alcohol for 24 hours or while you are taking pain medication.  You should not sign legally binding documents.  [] If you had a regional block, you will not have control of the leg for up to 12 hours.  Protect the leg and follow your physician's specific instructions.  Post-Operative Day #1  Post-Operative Day #1 is counted as the 1st day after surgery.  Leave ace wrap on day one to aid in the reduction of swelling.  If dressing is too tight it can be rewrapped.  Post-Operative Day #2  Ace wrap and dressing may be removed.  Put a 4x4 dressing to suture or staple site.  Change dressing once or twice daily until suture or staples are removed.  It is normal to have some redness or irritation around skin sutures or stapes, however, if you have any expanding areas of redness with a persistent fever and increasing pain notify the physician as soon as possible.  On Post-Operative Day #2, you may want to reapply the ace wrap.  Put ace wrap directly over the knee to add compression and aid in swelling reduction.  It is normal to have some swelling down into the calf and ankle after knee arthroscopy or Anterior Cruciate Ligament (ACL) reconstruction.  If you notice a  significant amount of swelling or increasing pain in calf area, notify the physician immediately.   Post-Operative Day #4  You may shower.  During shower, avoid too much water to incision site.  Let water run down leg and then pat dry.  Do not put any ointments on the incision unless directed by surgeon.  Reapply dry dressing to sutures or staple sites.    General Information  Full weight bearing with crutches is allowed after a standard knee arthroscopy or ACL reconstruction unless instructed otherwise by surgeon.  You may put as much weight on knee as tolerable.  If given a knee immobilizer postoperatively, usually with an ACL reconstruction, this is for protection with early ambulation until you are steadier on your feet.  It is very important to take off immobilizer to do range of motion and flexion and extension exercises.  You do not have to sleep in the knee immobilizer.  Knee range of motion exercises should be started as early as the day of surgery.  Try to achieve full extension and start working on range of motion and flexion of the knee.  Move the ankle up and down periodically to help reduce swelling as well as reduce blood pooling.  To allow full extension of the knee and prevent blood clots it is very important to put pillows at the level of the mid-calf to the foot.  DO NOT put pillows directly behind knee.  SPECIAL INSTRUCTIONS:                                                             DISCHARGE INSTRUCTIONS  POST-OPERATIVE   Knee Arthroscopic Surgery      General Instructions  You will receive a prescription for physical therapy, unless otherwise instructed by physician.  Physical therapy is imperative especially after ACL reconstruction and some knee arthroscopies for swelling reduction, knee range of motion and strengthening.  The Cold Therapy System can help reduce swelling and decrease pain.  Utilize device for 30-60 minutes per session, with 30-60 minute breaks in between sessions.  It is  recommended to use, as directed, for the first 72 hours after surgery until bedtime.  After 72 hours, continue using the device as needed until your follow-up appointment with your physician.  Never place directly on skin.  Please refer to the instruction sheet given.   [] Use ice pack on the knee for 20 minutes on and 20 minutes off.  Never place ice directly on the skin.  By following this, you will cool the knee sufficiently.  Avoid getting dressing wet.  To help reduce swelling and minimize throbbing pain, use pillows to keep the knee elevated above the level of the heart, even while sleeping.  Sit with the leg propped up on a footstool or chair with pillows.  Exercises toes for 10 minutes every hour while awake.  If you are given a prescription for pain medication, take it as prescribed.  If you are able to take over-the-counter anti-inflammatory medications such as Motrin or Aleve, you may take as per package instructions in between the pain medication to help enhance pain control and reduce swelling.  If you are taking the pain medication prescribed, do not take Tylenol too unless you consult with the pharmacist. Generally, the pain medications prescribed have Tylenol in them and too much Tylenol can be harmful.  You should stop the anti-inflammatory medication if you experience any stomach/GI disturbances.  Consult with your physician or your pharmacist before taking over-the-counter pain medications/anti-inflammatories. It is not uncommon to have a fever post-operatively especially in the first 24-48 hours.  Deep breathing and coughing and early ambulation will help.  Anti-inflammatories can be used for fever reduction also.  If unable to urinate 6 to 8 hours after surgery or urinating frequently in small amounts, notify the physician or go to the nearest Emergency Room.  NOTIFY YOUR PHYSICIAN IF YOU EXPERIENCE:  Numbness of toes.  Inability to move toes.  Extreme coldness, paleness or blue dis-coloration  of toes.  Any pain other than from the incision, such as swelling of the leg that blocks circulation of the toes.  Follow verbal instructions from your doctor.  Medications per physician instructions as indicated on Discharge Medication Information Sheet.  You should see  ______________________for follow-up care  on   .  Phone number: _________________________  Missing your appointment/follow-up could lead to serious complications.  If you have an emergency and cannot reach your doctor, go to an Emergency Room nearest your home.

## 2023-12-12 ENCOUNTER — TELEPHONE (OUTPATIENT)
Dept: ORTHOPEDIC SURGERY | Facility: CLINIC | Age: 58
End: 2023-12-12
Payer: OTHER GOVERNMENT

## 2023-12-12 NOTE — TELEPHONE ENCOUNTER
The [facility] received a fax that requires your attention. The document has been indexed to the patient’s chart for your review.      Reason for sending: RECEIVED FAX FROM [employer] REQUESTING ADDITIONAL INFORMATION FOR PREMIEREMED AUTHORIZATION    Documents Description: [employer]-INFO NEEDED-12/11/2023    Name of Sender: [employer]    Date Indexed: 12/12/2023

## 2023-12-20 ENCOUNTER — TREATMENT (OUTPATIENT)
Dept: PHYSICAL THERAPY | Facility: CLINIC | Age: 58
End: 2023-12-20
Payer: OTHER GOVERNMENT

## 2023-12-20 DIAGNOSIS — R29.898 DECREASED STRENGTH INVOLVING KNEE JOINT: ICD-10-CM

## 2023-12-20 DIAGNOSIS — Z98.890 S/P ARTHROSCOPY OF LEFT KNEE: Primary | ICD-10-CM

## 2023-12-20 DIAGNOSIS — M25.562 ACUTE PAIN OF LEFT KNEE: ICD-10-CM

## 2023-12-20 NOTE — PROGRESS NOTES
Physical Therapy Initial Evaluation and Plan of Care  [address], [address] [place] [address]        Patient: Soheila Griffin   : 1965  Diagnosis/ICD-10 Code:  S/P arthroscopy of left knee [Z98.890]  Referring practitioner: Hardeep Acevedo MD  Date of Initial Visit: 2023  Today's Date: 2023  Patient seen for 1 sessions           Subjective Questionnaire: LEFS:       Subjective Evaluation    History of Present Illness  Mechanism of injury: Pt is s/p L knee arthroscopy with partial medial meniscectomy and chondroplasty 23.  Pt reports that she fell and broke her ankle in May 2023 and she believes this injured her knee as well.  Pt reports that she is taking norco for pain control but is not using ice.  Pt reports difficulty with prolonged walking, standing, stairs and sleeping due to pain/stiffness.  Pt reports intermittent buckling in L knee.  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      Pain  Current pain ratin  At best pain ratin  At worst pain ratin  Quality: dull ache, discomfort, tight and pulling  Relieving factors: change in position and medications  Aggravating factors: ambulation, squatting, stairs, prolonged positioning, repetitive movement, sleeping, standing and movement    Social Support  Lives in: multiple-level home  Lives with: spouse    Patient Goals  Patient goals for therapy: decreased edema, decreased pain, increased motion, increased strength, independence with ADLs/IADLs, return to sport/leisure activities and return to work           Objective          Static Posture     Head  Forward.    Hip   Hip (Right): Externally rotated and increased flexion.     Knee   Knee (Right): Flexed.     Tenderness   Left Knee   Tenderness in the inferior patella, lateral joint line, lateral patella, medial joint line, medial patella and superior patella. No  tenderness in the ITB, patellar tendon and pes anserinus.     Active Range of Motion   Left Knee   Flexion: 74 degrees with pain  Extension: 10 (lacking 0) degrees with pain    Right Knee   Flexion: 122 degrees   Extension: 0 degrees     Passive Range of Motion   Left Knee   Flexion: 100 degrees with pain  Extension: 5 (lacking 0) degrees with pain    Patellar Mobility   Left Knee Hypomobile in the left medial, left lateral, left superior and left inferior patellar tendon(s).     Strength/Myotome Testing     Left Hip   Planes of Motion   Flexion: 3-    Right Hip   Planes of Motion   Flexion: 4+    Left Knee   Flexion: 4-  Extension: 3+  Quadriceps contraction: poor    Right Knee   Flexion: 4+  Extension: 4+  Quadriceps contraction: fair    Left Ankle/Foot   Dorsiflexion: 5    Right Ankle/Foot   Dorsiflexion: 5    Ambulation     Observational Gait   Gait: antalgic   Decreased walking speed and stride length.     Additional Observational Gait Details  Pt ambulating with a straight cane and demonstrates antalgic gait on the L LE with decreased stance time, heel strike and push off.      General Comments     Knee Comments  Light touch sensation normal in bilateral lower extremities  L hamstring, gastroc/soleus tightness noted  Incisions inspected today.  Incision is clean and intact and no signs/symptoms of DVT or infection noted.  Mild swelling noted in L knee             Assessment & Plan       Assessment  Impairments: abnormal gait, abnormal muscle tone, abnormal or restricted ROM, activity intolerance, impaired balance, impaired physical strength, lacks appropriate home exercise program and pain with function   Functional limitations: lifting, sleeping, walking, uncomfortable because of pain, sitting, standing and unable to perform repetitive tasks   Assessment details: Patient presents s/p L knee arthroscopy with partial medial meniscectomy and chondroplasty 12/11/23 and demonstrates decreased L knee ROM, bilateral  hip and L knee weakness, decreased quad activation, gait abnormalities, L knee swelling and reports of pain limiting function during ADLs as shown on the LEFS.  Patient would benefit from skilled PT services in order to address deficits limiting function at this time.  HEP was given to patient this session and education on HEP/diagnosis/proper gait mechanics provided to patient.             Goals  Plan Goals: 1. The patient has limited ROM of the L knee.   LTG 1: 8 weeks:  The patient will demonstrate 0 to 120 degrees of ROM for the L knee in order to allow patient to complete prolonged walking, standing, stairs and other ADLs with decreased pain/difficulty.    STATUS:  New   STG 1a:  4 weeks:  The patient will demonstrate 0 to 110 degrees of ROM for the L knee.    STATUS:  New    2. The patient has limited strength of the left knee.   LTG 2: 12 weeks: The patient will demonstrate 4+/5 strength for L knee flexion and extension in order to allow patient improved joint stability    STATUS:  New   STG 2a: 6 weeks: The patient will demonstrate 4/5 strength for L knee flexion and extension    STATUS:  New      3. The patient has gait dysfunction.   LTG 3: 12 weeks:  The patient will ambulate without assistive device, independently, for community distances with minimal limp to the L lower extremity in order to improve mobility and allow patient to perform activities such as grocery shopping with greater ease.    STATUS:  New    4. Mobility: Walking/Moving Around Functional Limitation     LTG 4: 12 weeks:  The patient will demonstrate 19 % limitation by achieving a score of 65 on the LEFS.    STATUS:  New   STG 4 a: 6 weeks:  The patient will demonstrate 37% limitation by achieving a score of 50/80 on the LEFS.      STATUS:  New    Plan  Therapy options: will be seen for skilled therapy services  Planned modality interventions: TENS, electrical stimulation/Russian stimulation, thermotherapy (hydrocollator packs) and  cryotherapy  Planned therapy interventions: manual therapy, ADL retraining, balance/weight-bearing training, neuromuscular re-education, body mechanics training, postural training, soft tissue mobilization, flexibility, spinal/joint mobilization, functional ROM exercises, strengthening, gait training, stretching, home exercise program, therapeutic activities, transfer training and joint mobilization  Frequency: 2x week  Duration in weeks: 12  Treatment plan discussed with: patient      Timed:         Manual Therapy:    8     mins  33414;     Therapeutic Exercise:      13   mins  41352;     Neuromuscular Antonio:    0    mins  66874;    Therapeutic Activity:     0     mins  66435;     Gait Trainin     mins  46929;     Ultrasound:     0     mins  04724;    Ionto                               0    mins   23053  Self-care  __0__ mins 25758    Un-Timed:  Electrical Stimulation:    0     mins  16220 ( );  Traction     0     mins 43837  Low Eval     30     Mins  17442  Mod Eval     0     Mins  05570  High Eval                       0     Mins  55947  Hot pack     0     Mins    Cold pack                       0     Mins      Timed Treatment:   23   mins   Total Treatment:     53   mins    PT SIGNATURE: Rachel Hercules PT      Electronically signed 2023  [place] License: [id number]    Initial Certification    Certification Period: 2023 thru 3/18/2024  NPI: [id number]  I certify that the therapy services are furnished while this patient is under my care.  The services outlined above are required by this patient, and will be reviewed every 90 days.     PHYSICIAN: Hardeep Acevedo MD      DATE:     Please sign and return via fax to [phone].. Thank you, Psychiatric [facility].

## 2023-12-27 ENCOUNTER — OFFICE VISIT (OUTPATIENT)
Dept: ORTHOPEDIC SURGERY | Facility: CLINIC | Age: 58
End: 2023-12-27
Payer: OTHER GOVERNMENT

## 2023-12-27 VITALS
HEART RATE: 83 BPM | SYSTOLIC BLOOD PRESSURE: 130 MMHG | WEIGHT: 228 LBS | OXYGEN SATURATION: 94 % | HEIGHT: 62 IN | BODY MASS INDEX: 41.96 KG/M2 | TEMPERATURE: 98.7 F | DIASTOLIC BLOOD PRESSURE: 76 MMHG

## 2023-12-27 DIAGNOSIS — Z47.89 AFTERCARE FOLLOWING SURGERY OF THE MUSCULOSKELETAL SYSTEM: Primary | ICD-10-CM

## 2023-12-27 PROCEDURE — 99024 POSTOP FOLLOW-UP VISIT: CPT | Performed by: PHYSICIAN ASSISTANT

## 2023-12-27 NOTE — PROGRESS NOTES
"Chief Complaint  Follow-up of the Left Knee    Subjective          History of Present Illness      Soheila Griffin is a 58 y.o. female  presents to [facility] for     Patient presents for 2-week postop evaluation of left knee arthroscopic partial medial meniscectomy, chondroplasty, 12/11/2023.  Patient states she is happy with her progress thus far,.  She states that the pain that she had in her knee before the surgery is \"all gone\".  She states that she mainly has recovery pain at the incision sites.  She is attending therapy at [facility] she is only been to 1 visit but already has had improvement and she is doing home exercises.  She states she quit the pain medication within the first week.  She states the incisions have been healing well denies drainage or redness, she presents using a cane for ambulation she only use the cane out in public for support.  She denies calf pain, denies left lower extremity swelling.    Allergies   Allergen Reactions    Diclofenac Nausea Only and Dizziness    Sulfamethoxazole-Trimethoprim Photosensitivity    Amoxicillin Rash     CHILDHOOD RX    Oxycodone-Acetaminophen Palpitations        Social History     Socioeconomic History    Marital status:    Tobacco Use    Smoking status: Never    Smokeless tobacco: Never   Vaping Use    Vaping Use: Never used   Substance and Sexual Activity    Alcohol use: Not Currently    Drug use: Never    Sexual activity: Yes     Partners: Male     Birth control/protection: Vasectomy        REVIEW OF SYSTEMS    Constitutional: Awake alert and oriented x3, no acute distress, denies fevers, chills, weight loss  Respiratory: No respiratory distress  Vascular: Brisk cap refill, Intact distal pulses, No cyanosis, compartments soft with no signs or symptoms of compartment syndrome or DVT.   Cardiovascular: Denies chest pain, shortness of breath  Skin: Denies rashes, acute skin changes  Neurologic: Denies headache, " "loss of consciousness  MSK: Left knee pain      Objective   Vital Signs:   /76   Pulse 83   Temp 98.7 °F (37.1 °C) (Oral)   Ht 157.5 cm (62\")   Wt 103 kg (228 lb)   SpO2 94%   BMI 41.70 kg/m²     Body mass index is 41.7 kg/m².    Physical Exam       Left knee: Incisions are healing well, no erythema, no drainage or dehiscence, no signs of infection, no effusion.  Full extension flexion 115, stable to varus/valgus stress stable anterior/posterior drawer, nontender calf, negative Tyler testing,.      Procedures    Imaging Results (Most Recent)       None             Result Review :   The following data was reviewed by: RODO Dubon on 12/27/2023:               Assessment and Plan    Diagnoses and all orders for this visit:    1. Aftercare following surgery of LEFT KNEE ARTHROSCOPY WITH PARTIAL MEDIAL MENISCECTOMY CHONDROPLASTY 12/11/23 (Primary)        Reviewed operative images, operative report with the patient, discussed diagnosis and treatment options with her.  She will continue plan for therapy, she will take medication for pain as needed.  Continue weightbearing and activity as tolerated, sutures/staples removed in office today. Steri-strips applied. Discussed incision care/hygiene. May shower, but do not submerge in water until fully healed.  Advised patient that if any concerning symptoms regarding incision appearance occur that they should call us right away, patient expressed understanding.  Follow-up in 4 weeks for recheck    Call or return if worsening symptoms.    Follow Up   Return in about 4 weeks (around 1/24/2024) for Recheck.  Patient was given instructions and counseling regarding her condition or for health maintenance advice. Please see specific information pulled into the AVS if appropriate.       EMR Dragon/Transcription disclaimer:  Much of this encounter note is an electronic transcription/translation of spoken language to printed text, aka voice recognition.  The " electronic translation of spoken language may permit erroneous or at times nonsensical words or phrases to be inadvertently transcribed; although I have reviewed the note for such errors, some may still exist so please interpret based on surrounding text content.

## 2023-12-29 ENCOUNTER — TREATMENT (OUTPATIENT)
Dept: PHYSICAL THERAPY | Facility: CLINIC | Age: 58
End: 2023-12-29
Payer: OTHER GOVERNMENT

## 2023-12-29 DIAGNOSIS — Z98.890 S/P ARTHROSCOPY OF LEFT KNEE: Primary | ICD-10-CM

## 2023-12-29 DIAGNOSIS — M25.562 ACUTE PAIN OF LEFT KNEE: ICD-10-CM

## 2023-12-29 DIAGNOSIS — R29.898 DECREASED STRENGTH INVOLVING KNEE JOINT: ICD-10-CM

## 2023-12-29 NOTE — PROGRESS NOTES
Physical Therapy Daily Treatment Note  [address] [facility] [address], [address], [place] [address]      Patient: Soheila Griffin   : 1965  Diagnosis/ICD-10 Code:  S/P arthroscopy of left knee [Z98.890]  Referring practitioner: Hardeep Acevedo MD  Date of Initial Visit: Type: THERAPY  Noted: 2023  Today's Date: 2023  Patient seen for 2 sessions             Subjective   Soheila Griffin reports: mild L posterior knee pain.  Pt reports that HEP is going well and pain/motion have improved but she continues to have the most tightness in hamstring.    Objective   L knee AROM: 0-5-125  Poor quad activation  See Exercise, Manual, and Modality Logs for complete treatment.       Assessment/Plan  Patient tolerated all exercise progressions and manual therapy well today but continues to demonstrate L knee strength/ROM deficits limiting function. ROM improved significantly but pt is still most limited in L knee extension due to pain, tightness and decreased quad activation.  Continue to progress per patient tolerance.    Progress per Plan of Care           Timed:  Manual Therapy:    8     mins  23938;  Therapeutic Exercise:    22     mins  47941;     Neuromuscular Antonio:    0    mins  61149;    Therapeutic Activity:     8     mins  14860;     Gait Trainin     mins  52235;     Ultrasound:     0     mins  47145;    Electrical Stimulation:    0     mins  22301;  Iontophoresis     0     mins  98433    Untimed:  Electrical Stimulation:    0     mins  74570 ( );  Mechanical Traction:    0     mins  86923;   Fluidotherapy     0     mins  55013  Hot pack     0     Mins    Cold pack                       0     Mins     Timed Treatment:   38   mins   Total Treatment:     38   mins        Rachel Hercules PT    Electronically signed [unfilled]  [place] License: [id number]  NPI number: [id number]

## 2024-01-16 ENCOUNTER — TREATMENT (OUTPATIENT)
Dept: PHYSICAL THERAPY | Facility: CLINIC | Age: 59
End: 2024-01-16
Payer: OTHER GOVERNMENT

## 2024-01-16 DIAGNOSIS — Z98.890 S/P ARTHROSCOPY OF LEFT KNEE: Primary | ICD-10-CM

## 2024-01-16 DIAGNOSIS — M25.562 ACUTE PAIN OF LEFT KNEE: ICD-10-CM

## 2024-01-16 DIAGNOSIS — R29.898 DECREASED STRENGTH INVOLVING KNEE JOINT: ICD-10-CM

## 2024-01-16 NOTE — PROGRESS NOTES
"Physical Therapy Daily Treatment Note  [address] [facility], [address] [place] [address]        Patient: Soheila Griffin   : 1965  Diagnosis/ICD-10 Code:  S/P arthroscopy of left knee [Z98.890]  Referring practitioner: Hardeep Acevedo MD  Date of Initial Visit: Type: THERAPY  Noted: 2023  Today's Date: 2024  Patient seen for 3 sessions             Subjective   Soheila Griffin reports having persistent \"Stiffness\" in her left knee.  She rates her discomfort at 2-3/10 upon arrival today.  She reports follow-up with Orthopedic next week and states she plans to leave for a cruise on the following .  She reports that she continues to have pain and difficulty when going up/down steps.    Objective     Active Range of Motion   Left Knee   Flexion: 115 degrees   Extension: 5 (lacking 0) degrees with pain       Passive Range of Motion   Left Knee   Flexion: 120 degrees   Extension:  0 degrees with pain    + Pain and mal-tracking of Left patella with LAQ     See Exercise and Manual Logs for complete treatment.       Assessment/Plan    Pt tolerated therapy session moderately well - with progression of therapeutic exercises, CKC-Functional activities, and Manual therapy.  She has improved, but continues to have deficits in Her Left Hip/ Knee ROM,  Strength, and Stability; limiting functional mobility and ability to perform all ADLs without increased pain at this time.  Increase in Left Knee AROM/PROM as compared to initial evaluation.  Pt continues to report ease of symptom exacerbation during Open Chained activities and when ascending/descending stairs on her left.      Progress per Plan of Care - as tolerated.           Timed:  Manual Therapy:    8     mins  22022;  Therapeutic Exercise:    22     mins  00067;     Neuromuscular Antonio:    0    mins  12967;    Therapeutic Activity:     10     mins  81605;     Gait Trainin     mins  97760;     Ultrasound:     0     mins  28994;    Electrical " Stimulation:    0     mins  67529;  Iontophoresis     0     mins  37334    Untimed:  Electrical Stimulation:    0     mins  96933 ( );  Mechanical Traction:    0     mins  25249;   Fluidotherapy     0     mins  47114  Hot pack     00     mins  85193  Cold pack     0     mins  32138    Timed Treatment:   40   mins   Total Treatment:     40   mins        Lily Kovacs PTA  Physical Therapist Assistant

## 2024-01-19 ENCOUNTER — TELEPHONE (OUTPATIENT)
Dept: ORTHOPEDICS | Facility: OTHER | Age: 59
End: 2024-01-19
Payer: OTHER GOVERNMENT

## 2024-01-22 ENCOUNTER — TREATMENT (OUTPATIENT)
Dept: PHYSICAL THERAPY | Facility: CLINIC | Age: 59
End: 2024-01-22
Payer: OTHER GOVERNMENT

## 2024-01-22 DIAGNOSIS — Z98.890 S/P ARTHROSCOPY OF LEFT KNEE: Primary | ICD-10-CM

## 2024-01-22 DIAGNOSIS — M25.562 ACUTE PAIN OF LEFT KNEE: ICD-10-CM

## 2024-01-22 DIAGNOSIS — R29.898 DECREASED STRENGTH INVOLVING KNEE JOINT: ICD-10-CM

## 2024-01-22 PROCEDURE — 97530 THERAPEUTIC ACTIVITIES: CPT | Performed by: PHYSICAL THERAPIST

## 2024-01-22 PROCEDURE — 97110 THERAPEUTIC EXERCISES: CPT | Performed by: PHYSICAL THERAPIST

## 2024-01-22 NOTE — PROGRESS NOTES
Progress note  [address] [facility], [address] [place] [address]      Patient: Soheila Griffin   : 1965  Diagnosis/ICD-10 Code:  S/P arthroscopy of left knee [Z98.890]  Referring practitioner: Hardeep Acevedo MD  Date of Initial Visit: Type: THERAPY  Noted: 2023  Today's Date: 2024  Patient seen for 4 sessions        Subjective:   Soheila Griffin reports: that he has noticed improvements in overall pain, function and strength compared to initial evaluation.  Pt reports that the past 2 days she has noticed worsening and sharp pain in the anterior L knee.  Pt reports that she is still limited in prolonged walking, standing and stairs due to L knee pain.    Subjective Questionnaire: LEFS: 36/80      Subjective   Objective   Active Range of Motion   Left Knee   Flexion: 121 degrees with pain  Extension: 10 (lacking 0) degrees with pain     Right Knee   Flexion: 122 degrees   Extension: 0 degrees      Passive Range of Motion   Left Knee   Flexion: 100 degrees with pain  Extension: 5 (lacking 0) degrees with pain     Strength/Myotome Testing      Left Hip   Planes of Motion   Flexion: 4     Right Hip   Planes of Motion   Flexion: 4+     Left Knee   Flexion: 4+  Extension: 4  Quadriceps contraction: poor  SLR ext la degrees L     Right Knee   Flexion: 5  Extension: 5     Left Ankle/Foot   Dorsiflexion: 5     Right Ankle/Foot   Dorsiflexion: 5  Assessment & Plan       Assessment  Impairments: abnormal gait, abnormal muscle tone, abnormal or restricted ROM, activity intolerance, impaired balance, impaired physical strength, lacks appropriate home exercise program and pain with function   Functional limitations: lifting, sleeping, walking, uncomfortable because of pain, sitting, standing and unable to perform repetitive tasks   Assessment details: Patient demonstrates improvements in L knee strength, L knee flexion ROM and reports improvements in overall pain/function compared to initial evaluation.  Pt  however continues to demonstrate decreased L knee extension ROM, bilateral hip and L knee weakness, decreased quad activation, gait abnormalities and reports pain limiting function during ADLs as shown on the LEFS.  Pt has the most difficulty with eccentric lowering movements such as step downs due to L knee pain and quad weakness.  Pt also continues to demonstrate L patellar hypomobility limiting ROM.  Patient would continue to benefit from skilled PT services in order to address deficits limiting function at this time.      Goals  Plan Goals: 1. The patient has limited ROM of the L knee.   LTG 1: 8 weeks:  The patient will demonstrate 0 to 120 degrees of ROM for the L knee in order to allow patient to complete prolonged walking, standing, stairs and other ADLs with decreased pain/difficulty.    STATUS:  partially met, continue   STG 1a:  4 weeks:  The patient will demonstrate 0 to 110 degrees of ROM for the L knee.    STATUS:  partially met, continue    2. The patient has limited strength of the left knee.   LTG 2: 12 weeks: The patient will demonstrate 4+/5 strength for L knee flexion and extension in order to allow patient improved joint stability    STATUS:  partially met   STG 2a: 6 weeks: The patient will demonstrate 4/5 strength for L knee flexion and extension    STATUS:  met     3. The patient has gait dysfunction.   LTG 3: 12 weeks:  The patient will ambulate without assistive device, independently, for community distances with minimal limp to the L lower extremity in order to improve mobility and allow patient to perform activities such as grocery shopping with greater ease.    STATUS:  ongoing    4. Mobility: Walking/Moving Around Functional Limitation     LTG 4: 12 weeks:  The patient will demonstrate 19 % limitation by achieving a score of 65 on the LEFS.    STATUS:  ongoing   STG 4 a: 6 weeks:  The patient will demonstrate 37% limitation by achieving a score of 50/80 on the LEFS.      STATUS:   ongoing    Plan  Therapy options: will be seen for skilled therapy services  Planned modality interventions: TENS, electrical stimulation/Russian stimulation, thermotherapy (hydrocollator packs) and cryotherapy  Planned therapy interventions: manual therapy, ADL retraining, balance/weight-bearing training, neuromuscular re-education, body mechanics training, postural training, soft tissue mobilization, flexibility, spinal/joint mobilization, functional ROM exercises, strengthening, gait training, stretching, home exercise program, therapeutic activities, transfer training and joint mobilization  Frequency: 2x week  Duration in weeks: 8  Treatment plan discussed with: patient      Progress toward previous goals: Partially Met        Recommendations: Continue as planned  Timeframe: 2 months  Prognosis to achieve goals: good    PT SIGNATURE: Rachel Hercules, PT     Electronically signed 2024  DATE TREATMENT INITIATED: 2024  [place] License: [id number]      Based upon review of the patient's progress and continued therapy plan, it is my medical opinion that Soheila Griffin should continue physical therapy treatment at [facility]  [address] [place] [address] [address] [id number] [phone].      Timed:  Manual Therapy:    5     mins  92252;  Therapeutic Exercise:    10     mins  96518;     Neuromuscular Antonio:    0    mins  42876;    Therapeutic Activity:     8     mins  16742;     Gait Trainin     mins  17313;     Ultrasound:     0     mins  39687;    Electrical Stimulation:    0     mins  29139 ( );    Untimed:  Electrical Stimulation:    0     mins  51866 ( );  Mechanical Traction:    0     mins  14755;     Timed Treatment:   23   mins   Total Treatment:     39   mins

## 2024-01-23 ENCOUNTER — OFFICE VISIT (OUTPATIENT)
Dept: ORTHOPEDIC SURGERY | Facility: CLINIC | Age: 59
End: 2024-01-23
Payer: OTHER GOVERNMENT

## 2024-01-23 VITALS
HEART RATE: 78 BPM | HEIGHT: 62 IN | WEIGHT: 227.07 LBS | DIASTOLIC BLOOD PRESSURE: 76 MMHG | BODY MASS INDEX: 41.79 KG/M2 | OXYGEN SATURATION: 96 % | SYSTOLIC BLOOD PRESSURE: 147 MMHG

## 2024-01-23 DIAGNOSIS — Z47.89 AFTERCARE FOLLOWING SURGERY OF THE MUSCULOSKELETAL SYSTEM: Primary | ICD-10-CM

## 2024-01-23 PROCEDURE — 99024 POSTOP FOLLOW-UP VISIT: CPT | Performed by: PHYSICIAN ASSISTANT

## 2024-01-23 RX ORDER — TRAMADOL HYDROCHLORIDE 50 MG/1
50 TABLET ORAL EVERY 4 HOURS PRN
Qty: 32 TABLET | Refills: 0 | Status: SHIPPED | OUTPATIENT
Start: 2024-01-23

## 2024-01-23 NOTE — PROGRESS NOTES
Chief Complaint  Pain and Follow-up of the Left Knee    Subjective          History of Present Illness      Soheila Griffin is a 58 y.o. female  presents to [facility] for     Patient presents for follow-up evaluation of left knee arthroscopic partial medial meniscectomy chondroplasty 12/11/2023.  Patient states that her recovery has been doing well until 3 days ago she states she woke up and feels like she slept wrong and she has started to have a sharp pain in her anterior knee she points to the patellar tendon region as her area of pain she has been attending therapy she states her range of motion is well her strength is well but she states that sometimes when walking, stepping or going from sit to stand she has pain in her anterior knee.  She denies injury or trauma to the knee she denies fall.  She denies calf pain or swelling she states the incisions have healed well denies drainage or fever/chills.  She has been taking naproxen with some relief      Allergies   Allergen Reactions    Diclofenac Nausea Only and Dizziness    Sulfamethoxazole-Trimethoprim Photosensitivity    Amoxicillin Rash     CHILDHOOD RX    Oxycodone-Acetaminophen Palpitations        Social History     Socioeconomic History    Marital status:    Tobacco Use    Smoking status: Never    Smokeless tobacco: Never   Vaping Use    Vaping Use: Never used   Substance and Sexual Activity    Alcohol use: Not Currently    Drug use: Never    Sexual activity: Yes     Partners: Male     Birth control/protection: Vasectomy        REVIEW OF SYSTEMS    Constitutional: Awake alert and oriented x3, no acute distress, denies fevers, chills, weight loss  Respiratory: No respiratory distress  Vascular: Brisk cap refill, Intact distal pulses, No cyanosis, compartments soft with no signs or symptoms of compartment syndrome or DVT.   Cardiovascular: Denies chest pain, shortness of breath  Skin: Denies rashes, acute skin  "changes  Neurologic: Denies headache, loss of consciousness  MSK: Left knee pain      Objective   Vital Signs:   /76   Pulse 78   Ht 157.5 cm (62\")   Wt 103 kg (227 lb 1.2 oz)   SpO2 96%   BMI 41.53 kg/m²     Body mass index is 41.53 kg/m².    Physical Exam       Left knee: Well-healed incisions, no erythema ecchymosis swelling or signs of infection no effusion.  Full extension, patient able hold straight leg raise, knee extensor mechanism intact, mild tenderness to palpation of the patellar region and the patellar tendon region.  Full extension flexion 120, stable to varus/valgus stress stable anterior/posterior drawer, nontender calf, negative Tyler testing      Procedures    Imaging Results (Most Recent)       None             Result Review :   The following data was reviewed by: RODO Dubon on 01/23/2024:               Assessment and Plan    Diagnoses and all orders for this visit:    1. Aftercare following surgery of LEFT KNEE ARTHROSCOPY WITH PARTIAL MEDIAL MENISCECTOMY CHONDROPLASTY 12/11/23 (Primary)        Discussed diagnosis treatment options with the patient she was advised we could order MRI of the knee she would like to hold off on this for now she was given a knee brace she will continue naproxen she was also given a short course of tramadol to help with her pain she is going on a cruise this weekend for 7 days follow-up in 4 weeks for recheck, may consider MRI if no improvement    Call or return if worsening symptoms.    Follow Up   Return in about 4 weeks (around 2/20/2024) for Recheck.  Patient was given instructions and counseling regarding her condition or for health maintenance advice. Please see specific information pulled into the AVS if appropriate.       EMR Dragon/Transcription disclaimer:  Much of this encounter note is an electronic transcription/translation of spoken language to printed text, aka voice recognition.  The electronic translation of spoken language " may permit erroneous or at times nonsensical words or phrases to be inadvertently transcribed; although I have reviewed the note for such errors, some may still exist so please interpret based on surrounding text content.

## 2024-01-24 ENCOUNTER — OFFICE VISIT (OUTPATIENT)
Dept: INTERNAL MEDICINE | Facility: CLINIC | Age: 59
End: 2024-01-24
Payer: OTHER GOVERNMENT

## 2024-01-24 ENCOUNTER — HOSPITAL ENCOUNTER (OUTPATIENT)
Dept: CT IMAGING | Facility: HOSPITAL | Age: 59
Discharge: HOME | End: 2024-01-24
Admitting: NURSE PRACTITIONER
Payer: OTHER GOVERNMENT

## 2024-01-24 VITALS
SYSTOLIC BLOOD PRESSURE: 122 MMHG | BODY MASS INDEX: 43.24 KG/M2 | WEIGHT: 235 LBS | HEART RATE: 72 BPM | OXYGEN SATURATION: 96 % | HEIGHT: 62 IN | TEMPERATURE: 97 F | DIASTOLIC BLOOD PRESSURE: 74 MMHG | RESPIRATION RATE: 16 BRPM

## 2024-01-24 DIAGNOSIS — R07.89 CHEST TIGHTNESS: ICD-10-CM

## 2024-01-24 DIAGNOSIS — F41.9 ANXIOUS MOOD: ICD-10-CM

## 2024-01-24 DIAGNOSIS — R42 DIZZINESS: ICD-10-CM

## 2024-01-24 DIAGNOSIS — H51.11 CONVERGENCE INSUFFICIENCY: Primary | ICD-10-CM

## 2024-01-24 DIAGNOSIS — H51.11 CONVERGENCE INSUFFICIENCY: ICD-10-CM

## 2024-01-24 LAB
25(OH)D3 SERPL-MCNC: 29.9 NG/ML (ref 30–100)
ALBUMIN SERPL-MCNC: 4.1 G/DL (ref 3.5–5.2)
ALBUMIN/GLOB SERPL: 1.3 G/DL
ALP SERPL-CCNC: 87 U/L (ref 39–117)
ALT SERPL W P-5'-P-CCNC: 27 U/L (ref 1–33)
ANION GAP SERPL CALCULATED.3IONS-SCNC: 13.6 MMOL/L (ref 5–15)
AST SERPL-CCNC: 24 U/L (ref 1–32)
BASOPHILS # BLD AUTO: 0.05 10*3/MM3 (ref 0–0.2)
BASOPHILS NFR BLD AUTO: 0.9 % (ref 0–1.5)
BILIRUB SERPL-MCNC: 0.3 MG/DL (ref 0–1.2)
BUN SERPL-MCNC: 16 MG/DL (ref 6–20)
BUN/CREAT SERPL: 23.5 (ref 7–25)
CALCIUM SPEC-SCNC: 9.4 MG/DL (ref 8.6–10.5)
CHLORIDE SERPL-SCNC: 101 MMOL/L (ref 98–107)
CO2 SERPL-SCNC: 24.4 MMOL/L (ref 22–29)
CREAT SERPL-MCNC: 0.68 MG/DL (ref 0.57–1)
DEPRECATED RDW RBC AUTO: 38.7 FL (ref 37–54)
EGFRCR SERPLBLD CKD-EPI 2021: 100.5 ML/MIN/1.73
EOSINOPHIL # BLD AUTO: 0.14 10*3/MM3 (ref 0–0.4)
EOSINOPHIL NFR BLD AUTO: 2.5 % (ref 0.3–6.2)
ERYTHROCYTE [DISTWIDTH] IN BLOOD BY AUTOMATED COUNT: 13.3 % (ref 12.3–15.4)
GLOBULIN UR ELPH-MCNC: 3.1 GM/DL
GLUCOSE SERPL-MCNC: 86 MG/DL (ref 65–99)
HCT VFR BLD AUTO: 41.2 % (ref 34–46.6)
HGB BLD-MCNC: 13.8 G/DL (ref 12–15.9)
IMM GRANULOCYTES # BLD AUTO: 0.01 10*3/MM3 (ref 0–0.05)
IMM GRANULOCYTES NFR BLD AUTO: 0.2 % (ref 0–0.5)
LYMPHOCYTES # BLD AUTO: 1.98 10*3/MM3 (ref 0.7–3.1)
LYMPHOCYTES NFR BLD AUTO: 35.4 % (ref 19.6–45.3)
MCH RBC QN AUTO: 27.3 PG (ref 26.6–33)
MCHC RBC AUTO-ENTMCNC: 33.5 G/DL (ref 31.5–35.7)
MCV RBC AUTO: 81.4 FL (ref 79–97)
MONOCYTES # BLD AUTO: 0.41 10*3/MM3 (ref 0.1–0.9)
MONOCYTES NFR BLD AUTO: 7.3 % (ref 5–12)
NEUTROPHILS NFR BLD AUTO: 3.01 10*3/MM3 (ref 1.7–7)
NEUTROPHILS NFR BLD AUTO: 53.7 % (ref 42.7–76)
NRBC BLD AUTO-RTO: 0 /100 WBC (ref 0–0.2)
PLATELET # BLD AUTO: 250 10*3/MM3 (ref 140–450)
PMV BLD AUTO: 9.5 FL (ref 6–12)
POTASSIUM SERPL-SCNC: 4.4 MMOL/L (ref 3.5–5.2)
PROT SERPL-MCNC: 7.2 G/DL (ref 6–8.5)
RBC # BLD AUTO: 5.06 10*6/MM3 (ref 3.77–5.28)
SODIUM SERPL-SCNC: 139 MMOL/L (ref 136–145)
T3FREE SERPL-MCNC: 2.94 PG/ML (ref 2–4.4)
T4 FREE SERPL-MCNC: 0.98 NG/DL (ref 0.93–1.7)
TSH SERPL DL<=0.05 MIU/L-ACNC: 2.35 UIU/ML (ref 0.27–4.2)
VIT B12 BLD-MCNC: 800 PG/ML (ref 211–946)
WBC NRBC COR # BLD AUTO: 5.6 10*3/MM3 (ref 3.4–10.8)

## 2024-01-24 PROCEDURE — 82607 VITAMIN B-12: CPT | Performed by: NURSE PRACTITIONER

## 2024-01-24 PROCEDURE — 85025 COMPLETE CBC W/AUTO DIFF WBC: CPT | Performed by: NURSE PRACTITIONER

## 2024-01-24 PROCEDURE — 70470 CT HEAD/BRAIN W/O & W/DYE: CPT

## 2024-01-24 PROCEDURE — 84443 ASSAY THYROID STIM HORMONE: CPT | Performed by: NURSE PRACTITIONER

## 2024-01-24 PROCEDURE — 80053 COMPREHEN METABOLIC PANEL: CPT | Performed by: NURSE PRACTITIONER

## 2024-01-24 PROCEDURE — 82306 VITAMIN D 25 HYDROXY: CPT | Performed by: NURSE PRACTITIONER

## 2024-01-24 PROCEDURE — 25510000001 IOPAMIDOL PER 1 ML: Performed by: NURSE PRACTITIONER

## 2024-01-24 PROCEDURE — 84481 FREE ASSAY (FT-3): CPT | Performed by: NURSE PRACTITIONER

## 2024-01-24 PROCEDURE — 84439 ASSAY OF FREE THYROXINE: CPT | Performed by: NURSE PRACTITIONER

## 2024-01-24 RX ADMIN — IOPAMIDOL 50 ML: 755 INJECTION, SOLUTION INTRAVENOUS at 10:56

## 2024-01-24 NOTE — PROGRESS NOTES
"Chief Complaint  Anxiety, Insomnia, Irregular Heart Beat, and Hot flashes     Subjective        Soheila Griffin presents to [facility] for concerns for anxiety, insomnia, racing heart, hot flashes, dizziness.  Patient reports symptoms starting last Friday, she feels very jittery, anxious feeling.  She has racing thoughts at night, finding it difficult to sleep.  When she does lay down, she feels very dizzy.  She has had vertigo in the past, and she feels like this is somewhat different.  She feels sometimes heart flutters and palpitations, she is having hot flashes.  She is postmenopausal, does not have any regular postmenopausal symptoms.  She does have hypothyroidism, her labs were checked in November, she had been off medication and those labs were normal.  She was started back on low-dose Synthroid, and have not been rechecked since then.  She denies any significant chest pain, significant shortness of breath, vision changes, unilateral weakness of extremities.  No nausea, vomiting, diarrhea.    Objective   Vital Signs:   /74 (BP Location: Right arm, Patient Position: Sitting, Cuff Size: Large Adult)   Pulse 72   Temp 97 °F (36.1 °C) (Temporal)   Resp 16   Ht 157.5 cm (62.01\")   Wt 107 kg (235 lb)   SpO2 96%   BMI 42.97 kg/m²     Physical Exam  Vitals and nursing note reviewed.   Constitutional:       Appearance: Normal appearance.   HENT:      Head: Normocephalic and atraumatic.      Right Ear: Tympanic membrane, ear canal and external ear normal.      Left Ear: Tympanic membrane, ear canal and external ear normal.      Nose: Nose normal.      Mouth/Throat:      Mouth: Mucous membranes are moist.      Pharynx: Oropharynx is clear.   Eyes:      Extraocular Movements: Extraocular movements intact.      Conjunctiva/sclera: Conjunctivae normal.      Pupils: Pupils are equal, round, and reactive to light.      Comments: Overall extraocular movements were intact, however " patient during the convergence test had left eye move laterally during convergence, would not cross midline, on the left only, right would cross midline.  No nystagmus noted   Cardiovascular:      Rate and Rhythm: Normal rate and regular rhythm.      Pulses: Normal pulses.      Heart sounds: Normal heart sounds.   Pulmonary:      Effort: Pulmonary effort is normal.      Breath sounds: Normal breath sounds.   Musculoskeletal:      Cervical back: Normal range of motion and neck supple.   Neurological:      General: No focal deficit present.      Mental Status: She is alert.   Psychiatric:         Mood and Affect: Mood normal.        Result Review :  {The following data was reviewed by EFE Segovia on 01/24/24            CT Head With & Without Contrast    Result Date: 1/24/2024  Narrative: PROCEDURE: CT HEAD W WO CONTRAST  COMPARISON: None  INDICATIONS: DIZZINESS, ANXIETY, HEADACHES, NAUSEA, LETHARGY X 5 DAYS, NON SMOKER  PROTOCOL:   Standard imaging protocol performed    RADIATION:   DLP: 937mGy*cm   MA and/or KV was adjusted to minimize radiation dose.    CONTRAST: 50cc Isovue 370 I.V.  TECHNIQUE: After obtaining the patient's consent, CT images were obtained without and with non-ionic intravenous contrast material.   FINDINGS:  No focal brain lesion, mass or intracranial hemorrhage is seen.  The ventricles and cisterns are normal in size and configuration.  No abnormal enhancement is identified.  Visualized extracranial soft tissues appear normal.  No focal calvarial abnormality is seen.      Impression:   1. Negative study     Polo Saez M.D.       Electronically Signed and Approved By: Polo Saez M.D. on 1/24/2024 at 11:25                Diagnoses and all orders for this visit:    1. Convergence insufficiency (Primary)  -     Comprehensive Metabolic Panel  -     CBC & Differential  -     TSH  -     T4, Free  -     T3, Free  -     Vitamin B12  -     Vitamin D,25-Hydroxy  -     Cancel: CT Head With  Contrast; Future    2. Dizziness  -     Comprehensive Metabolic Panel  -     CBC & Differential  -     TSH  -     T4, Free  -     T3, Free  -     Vitamin B12  -     Vitamin D,25-Hydroxy  -     Cancel: CT Head With Contrast; Future    3. Anxious mood  -     Comprehensive Metabolic Panel  -     CBC & Differential  -     TSH  -     T4, Free  -     T3, Free  -     Vitamin B12  -     Vitamin D,25-Hydroxy  -     Cancel: CT Head With Contrast; Future    4. Chest tightness  -     Comprehensive Metabolic Panel  -     CBC & Differential  -     TSH  -     T4, Free  -     T3, Free  -     Vitamin B12  -     Vitamin D,25-Hydroxy  -     Cancel: CT Head With Contrast; Future    Patient with multiple symptoms, we discussed going on with CT scan stat to assess for any abnormalities.  In light of the convergence test, this did raise concern.  She was able to get CT scan done this morning at 10:30 AM, I have been able to review that, there were no significant abnormal findings.  Reported as a negative exam.  Called and discussed that with patient, she verbalizes understanding.  We will await lab work for further recommendations.      Follow Up   No follow-ups on file.  Patient was given instructions and counseling regarding her condition or for health maintenance advice. Please see specific information pulled into the AVS if appropriate.     Juan A Kaminski, APRN  1/24/2024  This note was electronically signed.

## 2024-01-26 ENCOUNTER — TREATMENT (OUTPATIENT)
Dept: PHYSICAL THERAPY | Facility: CLINIC | Age: 59
End: 2024-01-26
Payer: OTHER GOVERNMENT

## 2024-01-26 DIAGNOSIS — H51.9 CONVERGENCE INSUFFICIENCY OR PALSY IN BINOCULAR EYE MOVEMENT: ICD-10-CM

## 2024-01-26 DIAGNOSIS — R29.898 DECREASED STRENGTH INVOLVING KNEE JOINT: ICD-10-CM

## 2024-01-26 DIAGNOSIS — Z98.890 S/P ARTHROSCOPY OF LEFT KNEE: Primary | ICD-10-CM

## 2024-01-26 DIAGNOSIS — M25.562 ACUTE PAIN OF LEFT KNEE: ICD-10-CM

## 2024-01-26 DIAGNOSIS — H53.9 VISION CHANGES: Primary | ICD-10-CM

## 2024-01-26 NOTE — PROGRESS NOTES
Physical Therapy Daily Treatment Note  [address] [facility], [address], [place] [address]      Patient: Soheila Griffin   : 1965  Diagnosis/ICD-10 Code:  S/P arthroscopy of left knee [Z98.890]  Referring practitioner: Hardeep Acevedo MD  Date of Initial Visit: Type: THERAPY  Noted: 2023  Today's Date: 2024  Patient seen for 5 sessions             Subjective   Soheila Griffin reports: 3/10 L knee pain today.  Pt returned to MD and she states that he placed her on tramadol due to increased L knee pain and said if she continues to have increased pain at next follow up they will complete an MRI.      Objective   Poor-fair L quad contraction, hypomobility during L patellar glides in all movements  Tenderness around L lateral joint line and inferior patella    See Exercise, Manual, and Modality Logs for complete treatment.     Assessment/Plan  Patient tolerated all exercise progressions and manual therapy moderately well today but continues to demonstrate L knee strength/ROM deficits limiting function. Pt unable to tolerate eccentric lowering of L knee during SAQ/LAQs due to reports of pain and catching.  Pt very tender to palpation of L lateral joint line.  Some concern for lateral meniscus pathology due to these signs/symptoms.  Continue to progress per patient tolerance.   Progress per Plan of Care           Timed:  Manual Therapy:    2     mins  75695;  Therapeutic Exercise:    30     mins  11926;     Neuromuscular Antonio:    0    mins  35891;    Therapeutic Activity:     8     mins  88894;     Gait Trainin     mins  84310;     Ultrasound:     0     mins  72243;    Electrical Stimulation:    0     mins  28873;  Iontophoresis     0     mins  28695    Untimed:  Electrical Stimulation:    0     mins  83648 ( );  Mechanical Traction:    0     mins  52315;   Fluidotherapy     0     mins  11738  Hot pack     0     Mins    Cold pack                       0     Mins     Timed Treatment:   40    mins   Total Treatment:     40   mins        Rachel Hercules PT    Electronically signed @ROCÍO@  [place] License: [id number]  NPI number: [id number]

## 2024-02-13 ENCOUNTER — OFFICE VISIT (OUTPATIENT)
Dept: INTERNAL MEDICINE | Facility: CLINIC | Age: 59
End: 2024-02-13
Payer: OTHER GOVERNMENT

## 2024-02-13 VITALS
BODY MASS INDEX: 43.24 KG/M2 | WEIGHT: 235 LBS | TEMPERATURE: 98.4 F | HEIGHT: 62 IN | SYSTOLIC BLOOD PRESSURE: 132 MMHG | OXYGEN SATURATION: 97 % | HEART RATE: 84 BPM | DIASTOLIC BLOOD PRESSURE: 74 MMHG

## 2024-02-13 DIAGNOSIS — J02.9 SORE THROAT: Primary | ICD-10-CM

## 2024-02-13 DIAGNOSIS — R05.1 ACUTE COUGH: ICD-10-CM

## 2024-02-13 DIAGNOSIS — R52 BODY ACHES: ICD-10-CM

## 2024-02-13 LAB
EXPIRATION DATE: NORMAL
EXPIRATION DATE: NORMAL
FLUAV AG UPPER RESP QL IA.RAPID: NOT DETECTED
FLUBV AG UPPER RESP QL IA.RAPID: NOT DETECTED
INTERNAL CONTROL: NORMAL
INTERNAL CONTROL: NORMAL
Lab: NORMAL
Lab: NORMAL
S PYO AG THROAT QL: NEGATIVE
SARS-COV-2 AG UPPER RESP QL IA.RAPID: NOT DETECTED

## 2024-02-13 PROCEDURE — 87081 CULTURE SCREEN ONLY: CPT | Performed by: PHYSICIAN ASSISTANT

## 2024-02-13 RX ORDER — DOXYCYCLINE HYCLATE 100 MG/1
100 CAPSULE ORAL 2 TIMES DAILY
Qty: 14 CAPSULE | Refills: 0 | Status: SHIPPED | OUTPATIENT
Start: 2024-02-13 | End: 2024-02-20

## 2024-02-13 RX ORDER — PREDNISONE 20 MG/1
20 TABLET ORAL DAILY
Qty: 5 TABLET | Refills: 0 | Status: SHIPPED | OUTPATIENT
Start: 2024-02-13 | End: 2024-02-18

## 2024-02-15 LAB — BACTERIA SPEC AEROBE CULT: NORMAL

## 2024-02-20 ENCOUNTER — OFFICE VISIT (OUTPATIENT)
Dept: ORTHOPEDIC SURGERY | Facility: CLINIC | Age: 59
End: 2024-02-20
Payer: OTHER GOVERNMENT

## 2024-02-20 VITALS
DIASTOLIC BLOOD PRESSURE: 80 MMHG | WEIGHT: 235.89 LBS | HEART RATE: 80 BPM | OXYGEN SATURATION: 95 % | BODY MASS INDEX: 43.41 KG/M2 | HEIGHT: 62 IN | SYSTOLIC BLOOD PRESSURE: 125 MMHG

## 2024-02-20 DIAGNOSIS — M25.562 LEFT KNEE PAIN, UNSPECIFIED CHRONICITY: ICD-10-CM

## 2024-02-20 DIAGNOSIS — Z47.89 AFTERCARE FOLLOWING SURGERY OF THE MUSCULOSKELETAL SYSTEM: Primary | ICD-10-CM

## 2024-02-20 PROCEDURE — 99024 POSTOP FOLLOW-UP VISIT: CPT | Performed by: PHYSICIAN ASSISTANT

## 2024-02-20 NOTE — PROGRESS NOTES
"Chief Complaint  Pain and Follow-up of the Left Knee    Subjective          History of Present Illness      Soheila Griffin is a 58 y.o. female  presents to [facility] for     Patient presents for follow-up evaluation of left knee arthroscopic partial medial meniscectomy, chondroplasty, 12/11/2023.  She was last seen 1/23/2024 she was about to go on a cruise.  She is complaining of knee pain she points to the medial and lateral knee as her area of pain.  She has been taking naproxen she was given tramadol.  She states her pain has persisted she went on a cruise and then went on a trip to [place] and states both of these she \"overdid it \".  She states that her knee has been very painful trouble sleeping at night due to her pain.  She presents using a cane.  She denies swelling, denies locking catching or buckling.      Allergies   Allergen Reactions    Diclofenac Nausea Only and Dizziness    Sulfamethoxazole-Trimethoprim Photosensitivity    Amoxicillin Rash     CHILDHOOD RX    Oxycodone-Acetaminophen Palpitations        Social History     Socioeconomic History    Marital status:    Tobacco Use    Smoking status: Former     Packs/day: 0.25     Years: 3.00     Additional pack years: 0.00     Total pack years: 0.75     Types: Cigarettes    Smokeless tobacco: Never   Vaping Use    Vaping Use: Never used   Substance and Sexual Activity    Alcohol use: Not Currently    Drug use: Never    Sexual activity: Yes     Partners: Male     Birth control/protection: Vasectomy        REVIEW OF SYSTEMS    Constitutional: Awake alert and oriented x3, no acute distress, denies fevers, chills, weight loss  Respiratory: No respiratory distress  Vascular: Brisk cap refill, Intact distal pulses, No cyanosis, compartments soft with no signs or symptoms of compartment syndrome or DVT.   Cardiovascular: Denies chest pain, shortness of breath  Skin: Denies rashes, acute skin changes  Neurologic: " "Denies headache, loss of consciousness  MSK: Left knee pain      Objective   Vital Signs:   /80   Pulse 80   Ht 157.5 cm (62\")   Wt 107 kg (235 lb 14.3 oz)   SpO2 95%   BMI 43.15 kg/m²     Body mass index is 43.15 kg/m².    Physical Exam       Left knee: Well-healed incisions, no erythema ecchymosis swelling or signs of infection, no effusion full extension patient hold straight leg raise, knee extensor mechanism intact, tender palpation of medial and lateral knee along the joint line, full extension flexion 120, stable to varus/valgus stress stable anterior/posterior drawer      Procedures    Imaging Results (Most Recent)       Procedure Component Value Units Date/Time    XR Knee 3 View Left [[id number]] Resulted: 02/20/24 1106     Updated: 02/20/24 1106    Narrative:      View:AP/Lateral and Sunrise view(s)  Site: Left knee  Indication: Left knee pain  Study: X-rays ordered, taken in the office, and reviewed today  X-ray: Advanced tricompartmental degenerative changes with near   bone-on-bone medial joint space narrowing and osteophyte formation, no   fracture, no dislocation, no acute osseous abnormalities  Comparative data: Review of studies             Result Review :   The following data was reviewed by: RODO Dubon on 02/20/2024:  Data reviewed : Radiologic studies reviewed by me with the patient              Assessment and Plan    Diagnoses and all orders for this visit:    1. Aftercare following surgery of LEFT KNEE ARTHROSCOPY WITH PARTIAL MEDIAL MENISCECTOMY CHONDROPLASTY 12/11/23 (Primary)    2. Left knee pain, unspecified chronicity  -     XR Knee 3 View Left        Reviewed x-rays with the patient discussed diagnosis and treatment options with her, she was advised she is candidate for knee replacement based on advanced arthritis she would like to hold off on surgery, she will continue naproxen and weightbearing as tolerated we discussed injection and 1 month follow-up in 1 " month for possible injection    Call or return if worsening symptoms.    Follow Up   Return in about 4 weeks (around 3/19/2024) for Recheck.  Patient was given instructions and counseling regarding her condition or for health maintenance advice. Please see specific information pulled into the AVS if appropriate.       EMR Dragon/Transcription disclaimer:  Much of this encounter note is an electronic transcription/translation of spoken language to printed text, aka voice recognition.  The electronic translation of spoken language may permit erroneous or at times nonsensical words or phrases to be inadvertently transcribed; although I have reviewed the note for such errors, some may still exist so please interpret based on surrounding text content.

## 2024-03-05 ENCOUNTER — HOSPITAL ENCOUNTER (OUTPATIENT)
Dept: MAMMOGRAPHY | Facility: HOSPITAL | Age: 59
Discharge: HOME | End: 2024-03-05
Admitting: OBSTETRICS & GYNECOLOGY
Payer: OTHER GOVERNMENT

## 2024-03-05 DIAGNOSIS — Z12.31 ENCOUNTER FOR SCREENING MAMMOGRAM FOR MALIGNANT NEOPLASM OF BREAST: ICD-10-CM

## 2024-03-05 PROCEDURE — 77063 BREAST TOMOSYNTHESIS BI: CPT

## 2024-03-05 PROCEDURE — 77067 SCR MAMMO BI INCL CAD: CPT

## 2024-03-14 ENCOUNTER — OFFICE VISIT (OUTPATIENT)
Dept: INTERNAL MEDICINE | Facility: CLINIC | Age: 59
End: 2024-03-14
Payer: OTHER GOVERNMENT

## 2024-03-14 VITALS
HEART RATE: 85 BPM | RESPIRATION RATE: 14 BRPM | TEMPERATURE: 97.7 F | BODY MASS INDEX: 43.79 KG/M2 | DIASTOLIC BLOOD PRESSURE: 60 MMHG | SYSTOLIC BLOOD PRESSURE: 100 MMHG | OXYGEN SATURATION: 97 % | WEIGHT: 238 LBS | HEIGHT: 62 IN

## 2024-03-14 DIAGNOSIS — H11.33 SUBCONJUNCTIVAL HEMORRHAGE OF BOTH EYES: ICD-10-CM

## 2024-03-14 DIAGNOSIS — H57.12 LEFT EYE PAIN: Primary | ICD-10-CM

## 2024-03-14 PROCEDURE — 99213 OFFICE O/P EST LOW 20 MIN: CPT | Performed by: NURSE PRACTITIONER

## 2024-03-14 NOTE — PROGRESS NOTES
"Chief Complaint  Eye Problem (Eye infection )    Subjective        Soheila Griffin presents to [facility] for left-sided ear redness.  Patient reports being seen by ophthalmology, she was having some issues with eye convergence, CT scan was completed, unremarkable.  Ophthalmology saw her earlier in the week, they did manipulate her eyeball, eyelid, applying drops and doing an exam.  Since then, patient has been reporting some discomfort to her left eye, she does have some allergic type symptoms, which could be contributing.  There is some redness on the left aspect of the left conjunctivo-, but not widespread.  There is also some on the right, on the very far right conjunctivo-.  There is no discharge, matting.    Objective   Vital Signs:   /60 (BP Location: Right arm, Patient Position: Sitting, Cuff Size: Large Adult)   Pulse 85   Temp 97.7 °F (36.5 °C) (Temporal)   Resp 14   Ht 157.5 cm (62.01\")   Wt 108 kg (238 lb)   SpO2 97%   BMI 43.52 kg/m²     Physical Exam  Vitals and nursing note reviewed.   Constitutional:       Appearance: Normal appearance.   HENT:      Head: Normocephalic and atraumatic.      Right Ear: External ear normal.      Left Ear: External ear normal.      Nose: Nose normal.      Mouth/Throat:      Mouth: Mucous membranes are moist.      Pharynx: Oropharynx is clear.   Eyes:      Extraocular Movements: Extraocular movements intact.      Conjunctiva/sclera: Conjunctivae normal.      Pupils: Pupils are equal, round, and reactive to light.      Comments: Redness noted to the left outer aspect of the left conjunctiva only, no discharge noted.    Redness noted to the right outer aspect of the right conjunctiva only, no discharge noted   Neurological:      Mental Status: She is alert.        Result Review :  {The following data was reviewed by EFE Segovia on 03/14/24                Diagnoses and all orders for this visit:    1. Left eye pain " (Primary)    2. Subconjunctival hemorrhage of both eyes    Exam is more consistent with subconjunctival hemorrhage, very small, could be traumatic related from the recent exam.  Continue to monitor, if this worsens, especially if there is discharge noted, patient can contact office, would be happy to send in drops or ointment if more consistent with bacterial conjunctivitis, but no signs of any conjunctivitis at this time.  If not improving, may need to follow back up with ophthalmology.      Follow Up   No follow-ups on file.  Patient was given instructions and counseling regarding her condition or for health maintenance advice. Please see specific information pulled into the AVS if appropriate.     Juan A Kaminski, EFE  3/14/2024  This note was electronically signed.

## 2024-03-19 ENCOUNTER — OFFICE VISIT (OUTPATIENT)
Dept: ORTHOPEDIC SURGERY | Facility: CLINIC | Age: 59
End: 2024-03-19
Payer: OTHER GOVERNMENT

## 2024-03-19 VITALS
OXYGEN SATURATION: 95 % | HEART RATE: 87 BPM | WEIGHT: 238.1 LBS | HEIGHT: 62 IN | SYSTOLIC BLOOD PRESSURE: 136 MMHG | DIASTOLIC BLOOD PRESSURE: 80 MMHG | BODY MASS INDEX: 43.82 KG/M2

## 2024-03-19 DIAGNOSIS — Z47.89 AFTERCARE FOLLOWING SURGERY OF THE MUSCULOSKELETAL SYSTEM: Primary | ICD-10-CM

## 2024-03-19 PROCEDURE — 99214 OFFICE O/P EST MOD 30 MIN: CPT | Performed by: PHYSICIAN ASSISTANT

## 2024-03-19 NOTE — PROGRESS NOTES
Chief Complaint  Pain and Follow-up of the Left Knee    Subjective          History of Present Illness      Soheila Griffin is a 58 y.o. female  presents to [facility] for     Patient presents for follow-up evaluation of left knee pain, left knee osteoarthritis with history of left knee arthroscopic partial medial meniscectomy and chondroplasty, 12/11/2023.  Patient states that her knee pain has been worsening she admits to pain going up and down stairs standing or walking causes her pain is causing pain to her ankle and her hip and her low back.  At last visit we discussed her x-rays which revealed advanced arthritis, we are considering steroid injection today but she states that her pain is severe enough that she would like to consider knee replacement.  She has tried conservative treatment with therapy naproxen and tramadol her surgery helped initially but she states that the pain has worsened she has trouble sleeping at night.  She would like to discuss knee replacement today      Allergies   Allergen Reactions    Diclofenac Nausea Only and Dizziness    Sulfamethoxazole-Trimethoprim Photosensitivity    Amoxicillin Rash     CHILDHOOD RX    Oxycodone-Acetaminophen Palpitations        Social History     Socioeconomic History    Marital status:    Tobacco Use    Smoking status: Former     Current packs/day: 0.25     Average packs/day: 0.3 packs/day for 3.0 years (0.8 ttl pk-yrs)     Types: Cigarettes    Smokeless tobacco: Never   Vaping Use    Vaping status: Never Used   Substance and Sexual Activity    Alcohol use: Not Currently    Drug use: Never    Sexual activity: Yes     Partners: Male     Birth control/protection: Vasectomy        REVIEW OF SYSTEMS    Constitutional: Awake alert and oriented x3, no acute distress, denies fevers, chills, weight loss  Respiratory: No respiratory distress  Vascular: Brisk cap refill, Intact distal pulses, No cyanosis, compartments soft with  "no signs or symptoms of compartment syndrome or DVT.   Cardiovascular: Denies chest pain, shortness of breath  Skin: Denies rashes, acute skin changes  Neurologic: Denies headache, loss of consciousness  MSK: Left knee pain      Objective   Vital Signs:   /80   Pulse 87   Ht 157.5 cm (62\")   Wt 108 kg (238 lb 1.6 oz)   SpO2 95%   BMI 43.55 kg/m²     Body mass index is 43.55 kg/m².    Physical Exam       Left knee: Well-healed incisions, no erythema ecchymosis swelling or signs of infection, no effusion full extension patient hold straight leg raise, knee extensor mechanism intact, tender palpation of medial and lateral knee along the joint line, full extension flexion 120, stable to varus/valgus stress stable anterior/posterior drawer        Procedures     Imaging Results (Most Recent)         Procedure Component Value Units Date/Time     XR Knee 3 View Left [[id number]] Resulted: 02/20/24 1106       Updated: 02/20/24 1106     Narrative:       View:AP/Lateral and Sunrise view(s)  Site: Left knee  Indication: Left knee pain  Study: X-rays ordered, taken in the office, and reviewed today  X-ray: Advanced tricompartmental degenerative changes with near   bone-on-bone medial joint space narrowing and osteophyte formation, no   fracture, no dislocation, no acute osseous abnormalities  Comparative data: Review of studies          Procedures    Imaging Results (Most Recent)       None             Result Review :   The following data was reviewed by: RODO Dubon on 03/19/2024:               Assessment and Plan    Diagnoses and all orders for this visit:    1. Aftercare following surgery of LEFT KNEE ARTHROSCOPY WITH PARTIAL MEDIAL MENISCECTOMY CHONDROPLASTY 12/11/23 (Primary)        Reviewed x-rays that were taken at last visit with the patient, discussed risk benefits procedure recovery of left knee replacement she would like to discuss this with Dr. Acevedo follow-up in 2 days for knee " replacement discussion with Dr. Acevedo    Discussed surgery., Risks/benefits discussed with patient including, but not limited to: infection, bleeding, neurovascular damage, re-rupture, aesthetic deformity, need for further surgery, and death., and Call or return if worsening symptoms.    Follow Up   Return in about 2 days (around 3/21/2024).  Patient was given instructions and counseling regarding her condition or for health maintenance advice. Please see specific information pulled into the AVS if appropriate.       EMR Dragon/Transcription disclaimer:  Much of this encounter note is an electronic transcription/translation of spoken language to printed text, aka voice recognition.  The electronic translation of spoken language may permit erroneous or at times nonsensical words or phrases to be inadvertently transcribed; although I have reviewed the note for such errors, some may still exist so please interpret based on surrounding text content.

## 2024-03-21 ENCOUNTER — OFFICE VISIT (OUTPATIENT)
Dept: ORTHOPEDIC SURGERY | Facility: CLINIC | Age: 59
End: 2024-03-21
Payer: OTHER GOVERNMENT

## 2024-03-21 ENCOUNTER — PREP FOR SURGERY (OUTPATIENT)
Dept: OTHER | Facility: HOSPITAL | Age: 59
End: 2024-03-21
Payer: OTHER GOVERNMENT

## 2024-03-21 VITALS
HEART RATE: 73 BPM | SYSTOLIC BLOOD PRESSURE: 142 MMHG | WEIGHT: 238.1 LBS | HEIGHT: 62 IN | OXYGEN SATURATION: 95 % | BODY MASS INDEX: 43.82 KG/M2 | DIASTOLIC BLOOD PRESSURE: 76 MMHG

## 2024-03-21 DIAGNOSIS — M17.12 PRIMARY OSTEOARTHRITIS OF LEFT KNEE: Primary | ICD-10-CM

## 2024-03-21 DIAGNOSIS — Z96.652 AFTERCARE FOLLOWING LEFT KNEE JOINT REPLACEMENT SURGERY: Primary | ICD-10-CM

## 2024-03-21 DIAGNOSIS — Z47.89 AFTERCARE FOLLOWING SURGERY OF THE MUSCULOSKELETAL SYSTEM: ICD-10-CM

## 2024-03-21 DIAGNOSIS — Z47.1 AFTERCARE FOLLOWING LEFT KNEE JOINT REPLACEMENT SURGERY: Primary | ICD-10-CM

## 2024-03-21 DIAGNOSIS — E66.01 OBESITY, MORBID, BMI 40.0-49.9: ICD-10-CM

## 2024-03-21 RX ORDER — TRANEXAMIC ACID 10 MG/ML
1000 INJECTION, SOLUTION INTRAVENOUS ONCE
OUTPATIENT
Start: 2024-03-21 | End: 2024-03-21

## 2024-03-21 RX ORDER — CEFAZOLIN SODIUM 2 G/100ML
2 INJECTION, SOLUTION INTRAVENOUS ONCE
OUTPATIENT
Start: 2024-03-21 | End: 2024-03-21

## 2024-03-21 RX ORDER — CEFAZOLIN SODIUM IN 0.9 % NACL 3 G/100 ML
3 INTRAVENOUS SOLUTION, PIGGYBACK (ML) INTRAVENOUS ONCE
OUTPATIENT
Start: 2024-03-21 | End: 2024-03-21

## 2024-03-21 NOTE — PROGRESS NOTES
"Chief Complaint  Follow-up of the Left Knee     Subjective      Soheila Griffin presents to [facility] for follow up evaluation of the left knee. The patient has a history of left knee arthroscopic partial medial meniscectomy and chondroplasty, 12/11/2023. She has been treating her left knee osteoarthritis conservatively. She reports pain with stairs. She locates pain to the anterior knee.     Allergies   Allergen Reactions    Diclofenac Nausea Only and Dizziness    Sulfamethoxazole-Trimethoprim Photosensitivity    Amoxicillin Rash     CHILDHOOD RX    Oxycodone-Acetaminophen Palpitations        Social History     Socioeconomic History    Marital status:    Tobacco Use    Smoking status: Former     Current packs/day: 0.25     Average packs/day: 0.3 packs/day for 3.0 years (0.8 ttl pk-yrs)     Types: Cigarettes    Smokeless tobacco: Never   Vaping Use    Vaping status: Never Used   Substance and Sexual Activity    Alcohol use: Not Currently    Drug use: Never    Sexual activity: Yes     Partners: Male     Birth control/protection: Vasectomy        I reviewed the patient's chief complaint, history of present illness, review of systems, past medical history, surgical history, family history, social history, medications, and allergy list.     Review of Systems     Constitutional: Denies fevers, chills, weight loss  Cardiovascular: Denies chest pain, shortness of breath  Skin: Denies rashes, acute skin changes  Neurologic: Denies headache, loss of consciousness  MSK: Left knee pain      Vital Signs:   /76   Pulse 73   Ht 157.5 cm (62\")   Wt 108 kg (238 lb 1.6 oz)   SpO2 95%   BMI 43.55 kg/m²          Physical Exam  General: Alert. No acute distress    Ortho Exam      Left knee- well healed scope scars. Positive EHL, FHL, GS and TA. Sensation intact to all 5 nerves of the foot. Positive pulses. Knee Extensor Mechanism  intact. Tender to the medial and lateral joint line. " Stable to varus/valgus stress. Stable to anterior/posterior drawer. Negative Sona's. Negative Lachman's. ROM -5-105 degrees. Positive crepitus.     Procedures      Imaging Results (Most Recent)       None             Result Review :       Mammo Screening Digital Tomosynthesis Bilateral With CAD    Result Date: 3/5/2024  Narrative: PROCEDURE: MAMMO SCREENING DIGITAL TOMOSYNTHESIS BILATERAL W CAD  COMPARISON: Other, MG, MAMMO DIAGNOSTIC LEFT W CAD, 8/19/2015, 10:29.  Other, MG, MAMMO SCREENING BILATERAL W CAD, 5/05/2017, 6:56.  Other, MG, MAMMO SCREENING BILATERAL W CAD, 11/01/2018, 10:50.  [facility], MG, MAMMO SCREENING DIGITAL TOMOSYNTHESIS BILATERAL W CAD, 1/31/2023, 16:09.  VIEWS:  BILATERAL CC AND MLO VIEWS WERE OBTAINED UTILIZING 3D TOMOSYNTHESIS AND R2 CAD SOFTWARE  INDICATIONS: SCREENING  FINDINGS:  No suspicious mass, area of architectural distortion or suspicious microcalcification is identified.      Impression:  Benign mammogram. Suggest routine mammographic screening.  RECOMMENDATION(S):  ROUTINE MAMMOGRAM AND CLINICAL EVALUATION IN 12 MONTHS.   BIRADS:  DIAGNOSTIC CATEGORY 1--NEGATIVE.   BREAST COMPOSITION: Heterogeneously dense,which may obscure small masses.  PLEASE NOTE:  A NORMAL MAMMOGRAM DOES NOT EXCLUDE THE POSSIBILITY OF BREAST CANCER. ANY CLINICALLY SUSPICIOUS PALPABLE LUMP SHOULD BE BIOPSIED.      GURDEEP GRANGER MD       Electronically Signed and Approved By: GURDEEP GRANGER MD on 3/05/2024 at 16:47                     Assessment and Plan     Diagnoses and all orders for this visit:    1. Primary osteoarthritis of left knee (Primary)    2. Aftercare following surgery of LEFT KNEE ARTHROSCOPY WITH PARTIAL MEDIAL MENISCECTOMY CHONDROPLASTY 12/11/23    3. Obesity, morbid, BMI 40.0-49.9        Discussed the treatment options with the patient, operative vs non-operative. I reviewed the recent x-ray with the patient. Discussed the risks and benefits of a Left Total Knee  Arthroplasty with Jenni Robot. The patient expressed understanding and wished to proceed.     Educated on risk of elevated BMI.  Discussed options for weight loss/decreasing BMI prior to procedure including dietician consult, weight loss options and exercise program., Discussed surgery., Risks/benefits discussed with patient including, but not limited to: infection, bleeding, neurovascular damage, malunion, nonunion, aesthetic deformity, need for further surgery, and death., Discussed with patient the implant type being used during surgery and patient understands., Surgery pamphlet given., Call or return if worsening symptoms., DME order for a 3 in 1 given today due to patient will be confined to one room/level of the home that does not offer a toilet during postop recovery. , I am requesting authorization for the violette® System to reduce the patient's pain and aid in their recovery. I believe the violette® Sport System will have positive impact on my patient's quality of life. I would appreciate prompt review of the information and authorization of the violette® System.  violette® Sport utilizes ultrasonic waves that penetrate 5 cm into the tissue, which increases circulation, oxygen and nutrient delivery, and the removal of waste products, such as lactic acid, from the site of the musculoskeletal injury.  The violette® System is a new FDA approved (FDA 510K 852071) treatment modality that that has been shown in recent clinical trials sponsored by the NIH (National Institutes of Health), the DOD (Department of Defense) and [facility] the research arm of the [employer] ([employer]) to reduce patient's pain, increase mobility and speed recovery. , I am ordering the use of the Nice1 cold therapy machine for 60 days post-op as part of an opioid-sparing approach to help manage pain and edema.  I feel this is medically necessary for the best care for this patient.   , and IFC can help extend pain relief and hopefully  reduce need for pain medication. TENS is used to control break through pain. NMES is used to help and strengthen weak muscles and prevent atrophy.    Follow Up     2 weeks postoperatively.        Patient was given instructions and counseling regarding her condition or for health maintenance advice. Please see specific information pulled into the AVS if appropriate.     Scribed for Hardeep Acevedo MD by Darshana Zapata.  03/21/24   14:33 EDT    I have personally performed the services described in this document as scribed by the above individual and it is both accurate and complete. Hardeep Acevedo MD 03/21/24

## 2024-04-11 DIAGNOSIS — Z01.818 ENCOUNTER FOR PREADMISSION TESTING: Primary | ICD-10-CM

## 2024-04-15 ENCOUNTER — PRE-ADMISSION TESTING (OUTPATIENT)
Dept: PREADMISSION TESTING | Facility: HOSPITAL | Age: 59
End: 2024-04-15
Payer: OTHER GOVERNMENT

## 2024-04-15 VITALS
DIASTOLIC BLOOD PRESSURE: 64 MMHG | OXYGEN SATURATION: 95 % | BODY MASS INDEX: 41.79 KG/M2 | WEIGHT: 227.07 LBS | HEIGHT: 62 IN | HEART RATE: 72 BPM | RESPIRATION RATE: 18 BRPM | TEMPERATURE: 97.6 F | SYSTOLIC BLOOD PRESSURE: 122 MMHG

## 2024-04-15 DIAGNOSIS — Z01.818 ENCOUNTER FOR PREADMISSION TESTING: ICD-10-CM

## 2024-04-15 LAB
ALBUMIN SERPL-MCNC: 4.1 G/DL (ref 3.5–5.2)
ALBUMIN/GLOB SERPL: 1.4 G/DL
ALP SERPL-CCNC: 94 U/L (ref 39–117)
ALT SERPL W P-5'-P-CCNC: 19 U/L (ref 1–33)
ANION GAP SERPL CALCULATED.3IONS-SCNC: 10.4 MMOL/L (ref 5–15)
AST SERPL-CCNC: 17 U/L (ref 1–32)
BASOPHILS # BLD AUTO: 0.04 10*3/MM3 (ref 0–0.2)
BASOPHILS NFR BLD AUTO: 0.8 % (ref 0–1.5)
BILIRUB SERPL-MCNC: 0.2 MG/DL (ref 0–1.2)
BUN SERPL-MCNC: 19 MG/DL (ref 6–20)
BUN/CREAT SERPL: 27.9 (ref 7–25)
CALCIUM SPEC-SCNC: 9.2 MG/DL (ref 8.6–10.5)
CHLORIDE SERPL-SCNC: 103 MMOL/L (ref 98–107)
CO2 SERPL-SCNC: 26.6 MMOL/L (ref 22–29)
CREAT SERPL-MCNC: 0.68 MG/DL (ref 0.57–1)
DEPRECATED RDW RBC AUTO: 40.3 FL (ref 37–54)
EGFRCR SERPLBLD CKD-EPI 2021: 100.5 ML/MIN/1.73
EOSINOPHIL # BLD AUTO: 0.12 10*3/MM3 (ref 0–0.4)
EOSINOPHIL NFR BLD AUTO: 2.5 % (ref 0.3–6.2)
ERYTHROCYTE [DISTWIDTH] IN BLOOD BY AUTOMATED COUNT: 13.2 % (ref 12.3–15.4)
GLOBULIN UR ELPH-MCNC: 3 GM/DL
GLUCOSE SERPL-MCNC: 87 MG/DL (ref 65–99)
HBA1C MFR BLD: 5.5 % (ref 4.8–5.6)
HCT VFR BLD AUTO: 42.5 % (ref 34–46.6)
HGB BLD-MCNC: 13.4 G/DL (ref 12–15.9)
IMM GRANULOCYTES # BLD AUTO: 0.01 10*3/MM3 (ref 0–0.05)
IMM GRANULOCYTES NFR BLD AUTO: 0.2 % (ref 0–0.5)
INR PPP: 1.01 (ref 0.86–1.15)
LYMPHOCYTES # BLD AUTO: 1.83 10*3/MM3 (ref 0.7–3.1)
LYMPHOCYTES NFR BLD AUTO: 37.8 % (ref 19.6–45.3)
MCH RBC QN AUTO: 26.4 PG (ref 26.6–33)
MCHC RBC AUTO-ENTMCNC: 31.5 G/DL (ref 31.5–35.7)
MCV RBC AUTO: 83.8 FL (ref 79–97)
MONOCYTES # BLD AUTO: 0.41 10*3/MM3 (ref 0.1–0.9)
MONOCYTES NFR BLD AUTO: 8.5 % (ref 5–12)
NEUTROPHILS NFR BLD AUTO: 2.43 10*3/MM3 (ref 1.7–7)
NEUTROPHILS NFR BLD AUTO: 50.2 % (ref 42.7–76)
NRBC BLD AUTO-RTO: 0 /100 WBC (ref 0–0.2)
PLATELET # BLD AUTO: 242 10*3/MM3 (ref 140–450)
PMV BLD AUTO: 9.2 FL (ref 6–12)
POTASSIUM SERPL-SCNC: 4.4 MMOL/L (ref 3.5–5.2)
PROT SERPL-MCNC: 7.1 G/DL (ref 6–8.5)
PROTHROMBIN TIME: 13.5 SECONDS (ref 11.8–14.9)
RBC # BLD AUTO: 5.07 10*6/MM3 (ref 3.77–5.28)
SODIUM SERPL-SCNC: 140 MMOL/L (ref 136–145)
WBC NRBC COR # BLD AUTO: 4.84 10*3/MM3 (ref 3.4–10.8)

## 2024-04-15 PROCEDURE — 85610 PROTHROMBIN TIME: CPT

## 2024-04-15 PROCEDURE — 36415 COLL VENOUS BLD VENIPUNCTURE: CPT

## 2024-04-15 PROCEDURE — 83036 HEMOGLOBIN GLYCOSYLATED A1C: CPT

## 2024-04-15 PROCEDURE — 80053 COMPREHEN METABOLIC PANEL: CPT

## 2024-04-15 PROCEDURE — 85025 COMPLETE CBC W/AUTO DIFF WBC: CPT

## 2024-04-15 NOTE — DISCHARGE INSTRUCTIONS
IMPORTANT INSTRUCTIONS - PRE-ADMISSION TESTING  DO NOT EAT OR CHEW anything after midnight the night before your procedure.    You may have CLEAR liquids up to 3 hours prior to ARRIVAL time.   Take the following medications the morning of your procedure with JUST A SIP OF WATER: SYNTHROID    DO NOT BRING your medications to the hospital with you, UNLESS something has changed since your PRE-Admission Testing appointment.  Hold all vitamins, supplements, and NSAIDS (Non- steroidal anti-inflammatory meds) for one week prior to surgery (you MAY take Tylenol or Acetaminophen).  If you are diabetic, check your blood sugar the morning of your procedure. If it is less than 70 or if you are feeling symptomatic, call the following number for further instructions: [phone] SAME DAY SURGERY.  Use your inhalers/nebulizers as usual, the morning of your procedure. BRING YOUR INHALERS with you.   Bring your CPAP or BIPAP to hospital, ONLY IF YOU WILL BE SPENDING THE NIGHT.   Make sure you have a ride home and have someone who will stay with you the day of your procedure after you go home.  If you have any questions, please call your Pre-Admission Testing NurseJULIO at [phone]_.   Per anesthesia request, do not smoke for 24 hours before your procedure or as instructed by your surgeon.    Clear Liquid Diet        Find out when you need to start a clear liquid diet.   Think of “clear liquids” as anything you could read a newspaper through. This includes things like water, broth, sports drinks, or tea WITHOUT any kind of milk or cream.           Once you are told to start a clear liquid diet, only drink these things until 3 hours before arrival to the hospital or when the hospital says to stop. Total volume limitation: 8 oz.       Clear liquids you CAN drink:   Water   Clear broth: beef, chicken, vegetable, or bone broth with nothing in it   Gatorade   Lemonade or Chris-aid   Soda   Tea, coffee (NO cream or honey)   Vannessa-O  (without fruit)   Popsicles (without fruit or cream)   Italian ices   Juice without pulp: apple, white, grape   You may use salt, pepper, and sugar    Do NOT drink:   Milk or cream   Soy milk, almond milk, coconut milk, or other non-dairy drinks and   creamers   Milkshakes or smoothies   Tomato juice   Orange juice   Grapefruit juice   Cream soups or any other than broth         Clear Liquid Diet:  Do NOT eat any solid food.  Do NOT eat or suck on mints or candy.  Do NOT chew gum.  Do NOT drink thick liquids like milk or juice with pulp in it.  Do NOT add milk, cream, or anything like soy milk or almond milk to coffee or tea.   PREOPERATIVE (BEFORE SURGERY)              BATHING INSTRUCTIONS  Instructions:    You will need to shower 3 separate times utilizing the soap provided; at the times indicated   below:     - 4/22 PM   -  4/23 AM   -  4/23 PM      Wash your hair and face with normal shampoo and soap, rinse it well before using the surgical soap.      In the shower, wet the skin completely with water from your neck to your feet. Apply the cleanser to your   body ONLY FROM THE NECK TO YOUR FEET.     Do NOT USE THE CLEANSER ON YOUR FACE, HEAD, OR GENITAL (PRIVATE) AREAS.   Keep it out of your eyes, ears, and mouth because of the risk of injury to those areas.      Scrub with a clean washcloth for each bath utilizing the soap provided from the top of your body to the   bottom starting at the neck area.      Pay close attention to your armpits, groin area, and the site of surgery.      Wash your body gently for 5 minutes. Stand outside the stream or turn off the water while scrubbing your   body. Do NOT wash with your regular soap after the surgical cleanser is used.      RINSE THE CLEANSER OFF COMPLETELY with plenty of water. Rinse the area again thoroughly.      Dry off with a clean towel. The surgical soap can cause dryness; however do NOT APPLY LOTION,   CREAM, POWDER, and/or DEODORANT AFTER SHOWERING.     Be  sure to where clean clothes after showering.      Ensure CLEAN BED LINENS AFTER FIRST wash with the surgical soap.      NO PETS ALLOWED IN THE BED with you after utilizing the surgical soap.

## 2024-04-24 ENCOUNTER — ANESTHESIA EVENT CONVERTED (OUTPATIENT)
Dept: ANESTHESIOLOGY | Facility: HOSPITAL | Age: 59
End: 2024-04-24
Payer: OTHER GOVERNMENT

## 2024-04-24 ENCOUNTER — ANESTHESIA (OUTPATIENT)
Dept: PERIOP | Facility: HOSPITAL | Age: 59
End: 2024-04-24
Payer: OTHER GOVERNMENT

## 2024-04-24 ENCOUNTER — APPOINTMENT (OUTPATIENT)
Dept: GENERAL RADIOLOGY | Facility: HOSPITAL | Age: 59
End: 2024-04-24
Payer: OTHER GOVERNMENT

## 2024-04-24 ENCOUNTER — ANESTHESIA EVENT (OUTPATIENT)
Dept: PERIOP | Facility: HOSPITAL | Age: 59
End: 2024-04-24
Payer: OTHER GOVERNMENT

## 2024-04-24 ENCOUNTER — HOSPITAL ENCOUNTER (OUTPATIENT)
Facility: HOSPITAL | Age: 59
Discharge: HOME | End: 2024-04-25
Attending: ORTHOPAEDIC SURGERY | Admitting: ORTHOPAEDIC SURGERY
Payer: OTHER GOVERNMENT

## 2024-04-24 DIAGNOSIS — R26.2 DIFFICULTY IN WALKING: Primary | ICD-10-CM

## 2024-04-24 DIAGNOSIS — M17.12 PRIMARY OSTEOARTHRITIS OF LEFT KNEE: ICD-10-CM

## 2024-04-24 PROCEDURE — C1776 JOINT DEVICE (IMPLANTABLE): HCPCS | Performed by: ORTHOPAEDIC SURGERY

## 2024-04-24 PROCEDURE — 27447 TOTAL KNEE ARTHROPLASTY: CPT | Performed by: ORTHOPAEDIC SURGERY

## 2024-04-24 PROCEDURE — 25010000002 MORPHINE PER 10 MG: Performed by: ORTHOPAEDIC SURGERY

## 2024-04-24 PROCEDURE — 27447 TOTAL KNEE ARTHROPLASTY: CPT | Performed by: PHYSICIAN ASSISTANT

## 2024-04-24 PROCEDURE — 25810000003 SODIUM CHLORIDE 0.9 % SOLUTION: Performed by: ORTHOPAEDIC SURGERY

## 2024-04-24 PROCEDURE — 20985 CPTR-ASST DIR MS PX: CPT | Performed by: ORTHOPAEDIC SURGERY

## 2024-04-24 PROCEDURE — 25010000002 KETOROLAC TROMETHAMINE PER 15 MG: Performed by: ORTHOPAEDIC SURGERY

## 2024-04-24 PROCEDURE — 25010000002 HYDROMORPHONE 1 MG/ML SOLUTION: Performed by: NURSE ANESTHETIST, CERTIFIED REGISTERED

## 2024-04-24 PROCEDURE — 25010000002 MEPERIDINE PER 100 MG: Performed by: NURSE ANESTHETIST, CERTIFIED REGISTERED

## 2024-04-24 PROCEDURE — 25010000002 CEFAZOLIN SODIUM 2 G RECONSTITUTED SOLUTION: Performed by: ORTHOPAEDIC SURGERY

## 2024-04-24 PROCEDURE — 94799 UNLISTED PULMONARY SVC/PX: CPT

## 2024-04-24 PROCEDURE — 25010000002 MIDAZOLAM PER 1MG: Performed by: ANESTHESIOLOGY

## 2024-04-24 PROCEDURE — 99204 OFFICE O/P NEW MOD 45 MIN: CPT | Performed by: FAMILY MEDICINE

## 2024-04-24 PROCEDURE — 73560 X-RAY EXAM OF KNEE 1 OR 2: CPT

## 2024-04-24 PROCEDURE — 25010000002 DEXAMETHASONE PER 1 MG: Performed by: NURSE ANESTHETIST, CERTIFIED REGISTERED

## 2024-04-24 PROCEDURE — 97161 PT EVAL LOW COMPLEX 20 MIN: CPT

## 2024-04-24 PROCEDURE — 25010000002 SUGAMMADEX 200 MG/2ML SOLUTION: Performed by: NURSE ANESTHETIST, CERTIFIED REGISTERED

## 2024-04-24 PROCEDURE — 25010000002 ROPIVACAINE PER 1 MG: Performed by: ORTHOPAEDIC SURGERY

## 2024-04-24 PROCEDURE — 20985 CPTR-ASST DIR MS PX: CPT | Performed by: PHYSICIAN ASSISTANT

## 2024-04-24 PROCEDURE — 25010000002 PROPOFOL 10 MG/ML EMULSION: Performed by: NURSE ANESTHETIST, CERTIFIED REGISTERED

## 2024-04-24 PROCEDURE — C1713 ANCHOR/SCREW BN/BN,TIS/BN: HCPCS | Performed by: ORTHOPAEDIC SURGERY

## 2024-04-24 PROCEDURE — 25810000003 LACTATED RINGERS PER 1000 ML: Performed by: ANESTHESIOLOGY

## 2024-04-24 PROCEDURE — 25010000002 ONDANSETRON PER 1 MG: Performed by: NURSE ANESTHETIST, CERTIFIED REGISTERED

## 2024-04-24 PROCEDURE — 25010000002 EPINEPHRINE 1 MG/ML SOLUTION: Performed by: ORTHOPAEDIC SURGERY

## 2024-04-24 PROCEDURE — 25010000002 MIDAZOLAM PER 1MG: Performed by: NURSE ANESTHETIST, CERTIFIED REGISTERED

## 2024-04-24 PROCEDURE — 25010000002 FENTANYL CITRATE (PF) 50 MCG/ML SOLUTION: Performed by: NURSE ANESTHETIST, CERTIFIED REGISTERED

## 2024-04-24 PROCEDURE — 94761 N-INVAS EAR/PLS OXIMETRY MLT: CPT

## 2024-04-24 DEVICE — STEM TIB/KN PERSONA CMT 5D SZD LT: Type: IMPLANTABLE DEVICE | Site: KNEE | Status: FUNCTIONAL

## 2024-04-24 DEVICE — IMPLANTABLE DEVICE: Type: IMPLANTABLE DEVICE | Site: KNEE | Status: FUNCTIONAL

## 2024-04-24 DEVICE — CAP TOTL KN CMT PRIMARY W/ROSA: Type: IMPLANTABLE DEVICE | Site: KNEE | Status: FUNCTIONAL

## 2024-04-24 DEVICE — CMT BONE PALACOS R HI/VISC 1X40: Type: IMPLANTABLE DEVICE | Site: KNEE | Status: FUNCTIONAL

## 2024-04-24 DEVICE — EXT STEM FEM/KN PERSONA TPR 14XPLS30MM: Type: IMPLANTABLE DEVICE | Site: KNEE | Status: FUNCTIONAL

## 2024-04-24 DEVICE — CAP EXT STEM KN UPCHRG: Type: IMPLANTABLE DEVICE | Site: KNEE | Status: FUNCTIONAL

## 2024-04-24 DEVICE — ART/SRF KN PERSONA/VE PS CD 8TO9 10MM LT: Type: IMPLANTABLE DEVICE | Site: KNEE | Status: FUNCTIONAL

## 2024-04-24 DEVICE — COMP FEM/KN PERSONA TIVANIUM CR CMT STD SZ8 LT: Type: IMPLANTABLE DEVICE | Site: KNEE | Status: FUNCTIONAL

## 2024-04-24 RX ORDER — LEVOTHYROXINE SODIUM 0.03 MG/1
25 TABLET ORAL
Status: DISCONTINUED | OUTPATIENT
Start: 2024-04-25 | End: 2024-04-25 | Stop reason: HOSPADM

## 2024-04-24 RX ORDER — OXYCODONE HYDROCHLORIDE 5 MG/1
5 TABLET ORAL
Status: DISCONTINUED | OUTPATIENT
Start: 2024-04-24 | End: 2024-04-24 | Stop reason: HOSPADM

## 2024-04-24 RX ORDER — DEXAMETHASONE SODIUM PHOSPHATE 4 MG/ML
INJECTION, SOLUTION INTRA-ARTICULAR; INTRALESIONAL; INTRAMUSCULAR; INTRAVENOUS; SOFT TISSUE AS NEEDED
Status: DISCONTINUED | OUTPATIENT
Start: 2024-04-24 | End: 2024-04-24 | Stop reason: SURG

## 2024-04-24 RX ORDER — FENTANYL CITRATE 50 UG/ML
INJECTION, SOLUTION INTRAMUSCULAR; INTRAVENOUS AS NEEDED
Status: DISCONTINUED | OUTPATIENT
Start: 2024-04-24 | End: 2024-04-24 | Stop reason: SURG

## 2024-04-24 RX ORDER — DEXMEDETOMIDINE HYDROCHLORIDE 100 UG/ML
INJECTION, SOLUTION INTRAVENOUS AS NEEDED
Status: DISCONTINUED | OUTPATIENT
Start: 2024-04-24 | End: 2024-04-24 | Stop reason: SURG

## 2024-04-24 RX ORDER — KETAMINE HCL IN NACL, ISO-OSM 100MG/10ML
SYRINGE (ML) INJECTION AS NEEDED
Status: DISCONTINUED | OUTPATIENT
Start: 2024-04-24 | End: 2024-04-24 | Stop reason: SURG

## 2024-04-24 RX ORDER — PROPOFOL 10 MG/ML
VIAL (ML) INTRAVENOUS AS NEEDED
Status: DISCONTINUED | OUTPATIENT
Start: 2024-04-24 | End: 2024-04-24 | Stop reason: SURG

## 2024-04-24 RX ORDER — ONDANSETRON 4 MG/1
4 TABLET, ORALLY DISINTEGRATING ORAL EVERY 6 HOURS PRN
Status: DISCONTINUED | OUTPATIENT
Start: 2024-04-24 | End: 2024-04-25 | Stop reason: HOSPADM

## 2024-04-24 RX ORDER — ACETAMINOPHEN 500 MG
1000 TABLET ORAL EVERY 8 HOURS
Status: DISCONTINUED | OUTPATIENT
Start: 2024-04-24 | End: 2024-04-25 | Stop reason: HOSPADM

## 2024-04-24 RX ORDER — PROMETHAZINE HYDROCHLORIDE 12.5 MG/1
25 TABLET ORAL ONCE AS NEEDED
Status: DISCONTINUED | OUTPATIENT
Start: 2024-04-24 | End: 2024-04-24 | Stop reason: HOSPADM

## 2024-04-24 RX ORDER — TRANEXAMIC ACID 10 MG/ML
1000 INJECTION, SOLUTION INTRAVENOUS ONCE
Status: COMPLETED | OUTPATIENT
Start: 2024-04-24 | End: 2024-04-24

## 2024-04-24 RX ORDER — EPHEDRINE SULFATE 50 MG/ML
INJECTION INTRAVENOUS AS NEEDED
Status: DISCONTINUED | OUTPATIENT
Start: 2024-04-24 | End: 2024-04-24 | Stop reason: SURG

## 2024-04-24 RX ORDER — ONDANSETRON 2 MG/ML
INJECTION INTRAMUSCULAR; INTRAVENOUS AS NEEDED
Status: DISCONTINUED | OUTPATIENT
Start: 2024-04-24 | End: 2024-04-24 | Stop reason: SURG

## 2024-04-24 RX ORDER — BUPIVACAINE HCL/0.9 % NACL/PF 0.1 %
2 PLASTIC BAG, INJECTION (ML) EPIDURAL EVERY 8 HOURS
Status: COMPLETED | OUTPATIENT
Start: 2024-04-24 | End: 2024-04-25

## 2024-04-24 RX ORDER — BUPIVACAINE HCL/0.9 % NACL/PF 0.1 %
2 PLASTIC BAG, INJECTION (ML) EPIDURAL ONCE
Status: DISCONTINUED | OUTPATIENT
Start: 2024-04-24 | End: 2024-04-24

## 2024-04-24 RX ORDER — BUPIVACAINE HCL/0.9 % NACL/PF 0.1 %
2 PLASTIC BAG, INJECTION (ML) EPIDURAL ONCE
Status: COMPLETED | OUTPATIENT
Start: 2024-04-24 | End: 2024-04-24

## 2024-04-24 RX ORDER — CEFAZOLIN SODIUM IN 0.9 % NACL 3 G/100 ML
3 INTRAVENOUS SOLUTION, PIGGYBACK (ML) INTRAVENOUS ONCE
Status: DISCONTINUED | OUTPATIENT
Start: 2024-04-24 | End: 2024-04-24

## 2024-04-24 RX ORDER — SODIUM CHLORIDE 9 MG/ML
100 INJECTION, SOLUTION INTRAVENOUS CONTINUOUS
Status: DISCONTINUED | OUTPATIENT
Start: 2024-04-24 | End: 2024-04-25 | Stop reason: HOSPADM

## 2024-04-24 RX ORDER — OXYCODONE AND ACETAMINOPHEN 7.5; 325 MG/1; MG/1
2 TABLET ORAL EVERY 4 HOURS PRN
Status: DISCONTINUED | OUTPATIENT
Start: 2024-04-24 | End: 2024-04-25 | Stop reason: HOSPADM

## 2024-04-24 RX ORDER — MIDAZOLAM HYDROCHLORIDE 2 MG/2ML
2 INJECTION, SOLUTION INTRAMUSCULAR; INTRAVENOUS ONCE
Status: COMPLETED | OUTPATIENT
Start: 2024-04-24 | End: 2024-04-24

## 2024-04-24 RX ORDER — CITALOPRAM 20 MG/1
20 TABLET ORAL NIGHTLY
Status: DISCONTINUED | OUTPATIENT
Start: 2024-04-24 | End: 2024-04-25 | Stop reason: HOSPADM

## 2024-04-24 RX ORDER — MAGNESIUM HYDROXIDE 1200 MG/15ML
LIQUID ORAL AS NEEDED
Status: DISCONTINUED | OUTPATIENT
Start: 2024-04-24 | End: 2024-04-24 | Stop reason: HOSPADM

## 2024-04-24 RX ORDER — BUPIVACAINE HYDROCHLORIDE AND EPINEPHRINE 5; 5 MG/ML; UG/ML
INJECTION, SOLUTION EPIDURAL; INTRACAUDAL; PERINEURAL
Status: COMPLETED | OUTPATIENT
Start: 2024-04-24 | End: 2024-04-24

## 2024-04-24 RX ORDER — LIDOCAINE HYDROCHLORIDE 20 MG/ML
INJECTION, SOLUTION EPIDURAL; INFILTRATION; INTRACAUDAL; PERINEURAL AS NEEDED
Status: DISCONTINUED | OUTPATIENT
Start: 2024-04-24 | End: 2024-04-24 | Stop reason: SURG

## 2024-04-24 RX ORDER — MEPERIDINE HYDROCHLORIDE 25 MG/ML
25 INJECTION INTRAMUSCULAR; INTRAVENOUS; SUBCUTANEOUS ONCE
Status: COMPLETED | OUTPATIENT
Start: 2024-04-24 | End: 2024-04-24

## 2024-04-24 RX ORDER — ACETAMINOPHEN 500 MG
1000 TABLET ORAL ONCE
Status: COMPLETED | OUTPATIENT
Start: 2024-04-24 | End: 2024-04-24

## 2024-04-24 RX ORDER — ROCURONIUM BROMIDE 10 MG/ML
INJECTION, SOLUTION INTRAVENOUS AS NEEDED
Status: DISCONTINUED | OUTPATIENT
Start: 2024-04-24 | End: 2024-04-24 | Stop reason: SURG

## 2024-04-24 RX ORDER — MEPERIDINE HYDROCHLORIDE 25 MG/ML
12.5 INJECTION INTRAMUSCULAR; INTRAVENOUS; SUBCUTANEOUS
Status: DISCONTINUED | OUTPATIENT
Start: 2024-04-24 | End: 2024-04-24 | Stop reason: HOSPADM

## 2024-04-24 RX ORDER — ONDANSETRON 2 MG/ML
4 INJECTION INTRAMUSCULAR; INTRAVENOUS ONCE AS NEEDED
Status: DISCONTINUED | OUTPATIENT
Start: 2024-04-24 | End: 2024-04-24 | Stop reason: HOSPADM

## 2024-04-24 RX ORDER — ONDANSETRON 2 MG/ML
4 INJECTION INTRAMUSCULAR; INTRAVENOUS EVERY 6 HOURS PRN
Status: DISCONTINUED | OUTPATIENT
Start: 2024-04-24 | End: 2024-04-25 | Stop reason: HOSPADM

## 2024-04-24 RX ORDER — OXYCODONE AND ACETAMINOPHEN 7.5; 325 MG/1; MG/1
1 TABLET ORAL EVERY 4 HOURS PRN
Status: DISCONTINUED | OUTPATIENT
Start: 2024-04-24 | End: 2024-04-25 | Stop reason: HOSPADM

## 2024-04-24 RX ORDER — SODIUM CHLORIDE, SODIUM LACTATE, POTASSIUM CHLORIDE, CALCIUM CHLORIDE 600; 310; 30; 20 MG/100ML; MG/100ML; MG/100ML; MG/100ML
9 INJECTION, SOLUTION INTRAVENOUS CONTINUOUS PRN
Status: DISCONTINUED | OUTPATIENT
Start: 2024-04-24 | End: 2024-04-25 | Stop reason: HOSPADM

## 2024-04-24 RX ORDER — MIDAZOLAM HYDROCHLORIDE 2 MG/2ML
2 INJECTION, SOLUTION INTRAMUSCULAR; INTRAVENOUS ONCE
Status: DISCONTINUED | OUTPATIENT
Start: 2024-04-24 | End: 2024-04-24

## 2024-04-24 RX ORDER — CETIRIZINE HYDROCHLORIDE 10 MG/1
10 TABLET ORAL NIGHTLY
Status: DISCONTINUED | OUTPATIENT
Start: 2024-04-24 | End: 2024-04-25 | Stop reason: HOSPADM

## 2024-04-24 RX ORDER — PROMETHAZINE HYDROCHLORIDE 25 MG/1
25 SUPPOSITORY RECTAL ONCE AS NEEDED
Status: DISCONTINUED | OUTPATIENT
Start: 2024-04-24 | End: 2024-04-24 | Stop reason: HOSPADM

## 2024-04-24 RX ORDER — TRANEXAMIC ACID 100 MG/ML
INJECTION, SOLUTION INTRAVENOUS AS NEEDED
Status: DISCONTINUED | OUTPATIENT
Start: 2024-04-24 | End: 2024-04-24 | Stop reason: SURG

## 2024-04-24 RX ORDER — KETOROLAC TROMETHAMINE 15 MG/ML
15 INJECTION, SOLUTION INTRAMUSCULAR; INTRAVENOUS EVERY 6 HOURS
Status: COMPLETED | OUTPATIENT
Start: 2024-04-24 | End: 2024-04-25

## 2024-04-24 RX ORDER — NALOXONE HCL 0.4 MG/ML
0.4 VIAL (ML) INJECTION
Status: DISCONTINUED | OUTPATIENT
Start: 2024-04-24 | End: 2024-04-25 | Stop reason: HOSPADM

## 2024-04-24 RX ADMIN — ACETAMINOPHEN 1000 MG: 500 TABLET ORAL at 17:26

## 2024-04-24 RX ADMIN — SODIUM CHLORIDE, POTASSIUM CHLORIDE, SODIUM LACTATE AND CALCIUM CHLORIDE: 600; 310; 30; 20 INJECTION, SOLUTION INTRAVENOUS at 14:02

## 2024-04-24 RX ADMIN — DEXMEDETOMIDINE 20 MCG: 100 INJECTION, SOLUTION INTRAVENOUS at 12:35

## 2024-04-24 RX ADMIN — ROCURONIUM BROMIDE 50 MG: 10 INJECTION, SOLUTION INTRAVENOUS at 12:32

## 2024-04-24 RX ADMIN — FENTANYL CITRATE 50 MCG: 50 INJECTION, SOLUTION INTRAMUSCULAR; INTRAVENOUS at 12:32

## 2024-04-24 RX ADMIN — PROPOFOL 150 MG: 10 INJECTION, EMULSION INTRAVENOUS at 12:32

## 2024-04-24 RX ADMIN — TRANEXAMIC ACID 1000 MG: 10 INJECTION, SOLUTION INTRAVENOUS at 08:48

## 2024-04-24 RX ADMIN — HYDROMORPHONE HYDROCHLORIDE 0.25 MG: 1 INJECTION, SOLUTION INTRAMUSCULAR; INTRAVENOUS; SUBCUTANEOUS at 14:52

## 2024-04-24 RX ADMIN — KETOROLAC TROMETHAMINE 15 MG: 15 INJECTION, SOLUTION INTRAMUSCULAR; INTRAVENOUS at 23:14

## 2024-04-24 RX ADMIN — MEPERIDINE HYDROCHLORIDE 25 MG: 25 INJECTION INTRAMUSCULAR; INTRAVENOUS; SUBCUTANEOUS at 11:28

## 2024-04-24 RX ADMIN — Medication 20 MG: at 13:04

## 2024-04-24 RX ADMIN — Medication 30 MG: at 12:32

## 2024-04-24 RX ADMIN — ACETAMINOPHEN 1000 MG: 500 TABLET ORAL at 08:40

## 2024-04-24 RX ADMIN — TRANEXAMIC ACID 1000 MG: 10 INJECTION, SOLUTION INTRAVENOUS at 10:48

## 2024-04-24 RX ADMIN — CEFAZOLIN 2 G: 2 INJECTION, POWDER, FOR SOLUTION INTRAVENOUS at 12:45

## 2024-04-24 RX ADMIN — SODIUM CHLORIDE, POTASSIUM CHLORIDE, SODIUM LACTATE AND CALCIUM CHLORIDE 9 ML/HR: 600; 310; 30; 20 INJECTION, SOLUTION INTRAVENOUS at 08:46

## 2024-04-24 RX ADMIN — DEXAMETHASONE SODIUM PHOSPHATE 4 MG: 4 INJECTION, SOLUTION INTRAMUSCULAR; INTRAVENOUS at 12:32

## 2024-04-24 RX ADMIN — OXYCODONE HYDROCHLORIDE AND ACETAMINOPHEN 1 TABLET: 7.5; 325 TABLET ORAL at 18:38

## 2024-04-24 RX ADMIN — LIDOCAINE HYDROCHLORIDE 60 MG: 20 INJECTION, SOLUTION INTRAVENOUS at 12:32

## 2024-04-24 RX ADMIN — CITALOPRAM HYDROBROMIDE 20 MG: 20 TABLET ORAL at 21:20

## 2024-04-24 RX ADMIN — OXYCODONE HYDROCHLORIDE AND ACETAMINOPHEN 1 TABLET: 7.5; 325 TABLET ORAL at 23:17

## 2024-04-24 RX ADMIN — CEFAZOLIN 2 G: 2 INJECTION, POWDER, FOR SOLUTION INTRAVENOUS at 17:26

## 2024-04-24 RX ADMIN — MIDAZOLAM HYDROCHLORIDE 2 MG: 1 INJECTION, SOLUTION INTRAMUSCULAR; INTRAVENOUS at 11:28

## 2024-04-24 RX ADMIN — HYDROMORPHONE HYDROCHLORIDE 0.5 MG: 1 INJECTION, SOLUTION INTRAMUSCULAR; INTRAVENOUS; SUBCUTANEOUS at 14:25

## 2024-04-24 RX ADMIN — ROCURONIUM BROMIDE 20 MG: 10 INJECTION, SOLUTION INTRAVENOUS at 13:14

## 2024-04-24 RX ADMIN — FENTANYL CITRATE 50 MCG: 50 INJECTION, SOLUTION INTRAMUSCULAR; INTRAVENOUS at 12:46

## 2024-04-24 RX ADMIN — SODIUM CHLORIDE 100 ML/HR: 9 INJECTION, SOLUTION INTRAVENOUS at 17:27

## 2024-04-24 RX ADMIN — EPHEDRINE SULFATE 10 MG: 50 INJECTION INTRAVENOUS at 13:29

## 2024-04-24 RX ADMIN — BUPIVACAINE HYDROCHLORIDE AND EPINEPHRINE BITARTRATE 30 ML: 5; .005 INJECTION, SOLUTION EPIDURAL; INTRACAUDAL; PERINEURAL at 11:30

## 2024-04-24 RX ADMIN — TRANEXAMIC ACID 1000 MG: 100 INJECTION INTRAVENOUS at 13:43

## 2024-04-24 RX ADMIN — MIDAZOLAM HYDROCHLORIDE 2 MG: 1 INJECTION, SOLUTION INTRAMUSCULAR; INTRAVENOUS at 11:27

## 2024-04-24 RX ADMIN — CETIRIZINE HYDROCHLORIDE 10 MG: 10 TABLET, FILM COATED ORAL at 21:20

## 2024-04-24 RX ADMIN — HYDROMORPHONE HYDROCHLORIDE 0.5 MG: 1 INJECTION, SOLUTION INTRAMUSCULAR; INTRAVENOUS; SUBCUTANEOUS at 14:36

## 2024-04-24 RX ADMIN — LIDOCAINE HYDROCHLORIDE 40 MG: 20 INJECTION, SOLUTION INTRAVENOUS at 13:56

## 2024-04-24 RX ADMIN — KETOROLAC TROMETHAMINE 15 MG: 15 INJECTION, SOLUTION INTRAMUSCULAR; INTRAVENOUS at 17:26

## 2024-04-24 RX ADMIN — EPHEDRINE SULFATE 10 MG: 50 INJECTION INTRAVENOUS at 13:04

## 2024-04-24 RX ADMIN — ONDANSETRON HYDROCHLORIDE 4 MG: 2 SOLUTION INTRAMUSCULAR; INTRAVENOUS at 13:46

## 2024-04-24 RX ADMIN — SUGAMMADEX 200 MG: 100 INJECTION, SOLUTION INTRAVENOUS at 14:02

## 2024-04-24 RX ADMIN — MIDAZOLAM HYDROCHLORIDE 2 MG: 1 INJECTION, SOLUTION INTRAMUSCULAR; INTRAVENOUS at 10:47

## 2024-04-24 NOTE — ANESTHESIA POSTPROCEDURE EVALUATION
Patient: Soheila Griffin    Procedure Summary       Date: 04/24/24 Room / Location: [facility] OR 03 / [facility] MAIN OR    Anesthesia Start: 1229 Anesthesia Stop: 1416    Procedure: LEFT TOTAL KNEE ARTHROPLASTY WITH PINEDA ROBOT (Left: Knee) Diagnosis:       Primary osteoarthritis of left knee      (Primary osteoarthritis of left knee [M17.12])    Surgeons: Hardeep Acevedo MD Provider: Amilcar Singh MD    Anesthesia Type: general ASA Status: 2            Anesthesia Type: general    Vitals  Vitals Value Taken Time   /58 04/24/24 1515   Temp 37.1 °C (98.8 °F) 04/24/24 1515   Pulse 93 04/24/24 1517   Resp 18 04/24/24 1515   SpO2 94 % 04/24/24 1517   Vitals shown include unfiled device data.        Post Anesthesia Care and Evaluation    Patient location during evaluation: bedside  Patient participation: complete - patient participated  Level of consciousness: awake and alert  Pain management: adequate    Airway patency: patent  Anesthetic complications: No anesthetic complications  PONV Status: none  Cardiovascular status: acceptable  Respiratory status: acceptable  Hydration status: acceptable    Comments: An Anesthesiologist personally participated in the most demanding procedures (including induction and emergence if applicable) in the anesthesia plan, monitored the course of anesthesia administration at frequent intervals and remained physically present and available for immediate diagnosis and treatment of emergencies.
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
## 2024-04-24 NOTE — OP NOTE
TOTAL KNEE ARTHROPLASTY WITH PINEDA ROBOT  Procedure Report    Patient Name:  Soheila Griffin  YOB: 1965    Date of Surgery:  4/24/2024     Indications:  The patient has had persistent knee pain and x-rays reveal advanced osteoarthritis.  They wish to proceed with operative treatment.  Risks and benefits of surgery were discussed.  Risk of bleeding, infection, damage to neurovascular structures, heart attack, stroke, DVT/PE, anesthesia complications including death, stiffness, instability, periprosthetic fracture, deep infection, among others were discussed.  Informed consent was obtained and they wish to proceed.      Pre-op Diagnosis:   Primary osteoarthritis of left knee [M17.12]       Post-Op Diagnosis Codes:     * Primary osteoarthritis of left knee [M17.12]    Procedure/CPT® Codes:      Procedure(s):  LEFT TOTAL KNEE ARTHROPLASTY WITH PINEDA ROBOT    Staff:  Surgeon(s):  Hardeep Acevedo MD    Assistant: Eduardo Hood PA    Surgical Approach: Knee Medial Parapatellar        Anesthesia: General with Block    Estimated Blood Loss: 75 mL    Implants:    Implant Name Type Inv. Item Serial No.  Lot No. LRB No. Used Action   CMT BONE PALACOS R HI/VISC 1X40 - [id number] Implant CMT BONE PALACOS R HI/VISC 1X40  [facility] [id number] Left 2 Implanted   PAT KN PERSONA ALLPOLY CMT 8X29MM - [id number] Implant PAT KN PERSONA ALLPOLY CMT 8X29MM  [employer] [id number] Left 1 Implanted   EXT STEM FEM/KN PERSONA TPR [id number] - [id number] Implant EXT STEM FEM/KN PERSONA TPR [id number]  [employer] [id number] Left 1 Implanted   STEM TIB/KN PERSONA CMT 5D SZD LT - [id number] Implant STEM TIB/KN PERSONA CMT 5D SZD LT  [employer] [id number] Left 1 Implanted   COMP FEM/KN PERSONA TIVANIUM CR CMT STD SZ8 LT - [id number] Implant COMP FEM/KN PERSONA TIVANIUM CR CMT STD SZ8 LT  [employer] [id number] Left 1 Implanted   ART/SRF KN PERSONA/VE PS CD 8TO9 10MM LT - [id number] Implant ART/SRF  MAIRA BROWER/HEATH IGLESIAS CD 8TO9 10MM LT  [employer] [id number] Left 1 Implanted       Specimen:          None        Findings: Knee osteoarthritis    Complications: None    Description of Procedure: The patient was brought to the operating room and placed supine on the OR table.  General anesthesia was given.  Preoperatively, the patient received an adductor canal nerve block. The patient was placed supine on the OR table.  All bony prominences were padded. The tourniquet was placed on the thigh. The affected lower extremity was prepped and draped in the usual sterile fashion. Preoperative antibiotic was given. Tranexamic acid intravenously was given at the beginning and the end of the surgery.  At this point, an Esmarch was used to exsanguinate the limb and the tourniquet was inflated. A longitudinal incision was made over the knee and a medial parapatellar arthrotomy was performed.  Appropriate releases were performed to the proximal medial tibia. The pre-patellar fat pad was excised.  The patella was subluxed laterally and the knee was examined. There was tricompartmental wear and osteophyte formation.  The medial and lateral menisci were removed.  Osteophytes of  the femur were debrided.     At this point 2 threaded guidepins were placed at the distal femur and the tracking sensor for the femur for the Jenni robot were placed.  2 percutaneous stab incisions were made in the proximal tibial shaft and 2 guidepins were placed for the tibial tracking censor was placed.  We then registered the mechanical axis and femoral and tibial landmarks for the Jenni Robot.  We then assessed the flexion extension gaps and the flexion and extension deformity.  We made our surgical plan and then executed the distal femoral cut, the 4-in-1 femoral cut and the tibial cut. The patella was then everted, resected, and sized.  Drill holes for the patella was made.  At this point the spacer block was placed in flexion extension and fit well.      We then placed the knee in flexion where laminar spreaders were used to open the flexion gaps.  The menisci were removed.  Osteophytes were removed from the posterior femur. The posterior capsule was injected with anesthetic cocktail.  At this point, the appropriately sized tibial tray was chosen.  This was pinned into place in line with the medial one third of the tibial tubercle. We then placed the trial femur. The trial insert was placed and the knee was brought to full extension. It was stable to varus and valgus stress.   The knee was then trialed with range of motion again. The femoral drill holes were made. The tibia was finished with the drill and the punch.  The components were removed. The knee was irrigated and dried. The cement was mixed and the tibia and femur were cemented into place.  The medial congruent spacer was then inserted.  The patella was cemented into place. The knee was irrigated with Irrisept and normal saline Pulsavac lavage.  The medial congruent polyethylene was inserted. The knee was stable to varus and valgus stress. There was good balance to the ligaments medially and laterally. There was good flexion with a stable patella. At this point, the tourniquet was released.  There was minimal bleeding controlled with electrocautery.   The arthrotomy was closed with #1 Vicryl at 30 degrees of flexion, the subcutaneous tissues with 2-0 Vicryl, and the skin with staples.  The percutaneous stab incisions on the tibia were closed with 3-0 nylon in horizontal mattress fashion.  These incisions were covered with Xeroform, 4 x 4, Tegaderm.  An Aquacel dressing was placed and an Ace wrap was placed. Patient awoke from anesthesia in stable condition. There were no complications. All counts were correct.       Assistant: Eduardo Hood PA  was responsible for performing the following activities: Retraction, Suction, Irrigation, and Placing Dressing and their skilled assistance was  necessary for the success of this case.    Hardeep Acevedo MD     Date: 4/24/2024  Time: 14:09 EDT
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
## 2024-04-24 NOTE — PLAN OF CARE
Goal Outcome Evaluation:      Pt is alert and oriented X 4, on 2L NC with EtCO2, and X 2 assist with walker. All scheduled medications given as ordered and pain managed with PRN medications. Safety checks maintained throughout shift with pt resting in chair, wheels to chair locked, and personal items and call light within reach. VSS.

## 2024-04-24 NOTE — ANESTHESIA PROCEDURE NOTES
Peripheral Block      Patient reassessed immediately prior to procedure    Patient location during procedure: pre-op  Start time: 4/24/2024 11:28 AM  Stop time: 4/24/2024 11:30 AM  Reason for block: at surgeon's request and post-op pain management  Performed by  Anesthesiologist: Amilcar Singh MD  Preanesthetic Checklist  Completed: patient identified, IV checked, site marked, risks and benefits discussed, surgical consent, monitors and equipment checked, pre-op evaluation and timeout performed  Prep:  Pt Position: supine  Sterile barriers:alcohol skin prep, partial drape, cap, washed/disinfected hands, mask and gloves  Prep: ChloraPrep  Patient monitoring: blood pressure monitoring, continuous pulse oximetry and EKG  Procedure    Sedation: yes  Performed under: local infiltration  Guidance:ultrasound guided and nerve stimulator    ULTRASOUND INTERPRETATION.  Using ultrasound guidance a 20 G gauge needle was placed in close proximity to the nerve, at which point, under ultrasound guidance anesthetic was injected in the area of the nerve and spread of the anesthesia was seen on ultrasound in close proximity thereto.  There were no abnormalities seen on ultrasound; a digital image was taken; and the patient tolerated the procedure with no complications. Images:still images obtained, printed/placed on chart    Laterality:left  Block Type:adductor canal block  Injection Technique:single-shot  Needle Type:echogenic  Needle Gauge:20 G (4in)  Resistance on Injection: none    Medications Used: bupivacaine-EPINEPHrine PF (MARCAINE w/EPI) 0.5% -1:[id number] injection - Injection   30 mL - 4/24/2024 11:30:00 AM      Post Assessment  Injection Assessment: negative aspiration for heme, no paresthesia on injection and incremental injection  Patient Tolerance:comfortable throughout block  Complications:no  Additional Notes  The block or continuous infusion is requested by the referring physician for management of postoperative  pain, or pain related to a procedure. Ultrasound guidance (deemed medically necessary). Painless injection, pt was awake and conversant during the procedure without complications. Needle and surrounding structures visualized throughout procedure. No adverse reactions or complications seen during this period. Post-procedure image showed no signs of complication, and anatomy was consistent with an uncomplicated nerve blockade.

## 2024-04-24 NOTE — ANESTHESIA PREPROCEDURE EVALUATION
Anesthesia Evaluation     Patient summary reviewed and Nursing notes reviewed   no history of anesthetic complications:   NPO Solid Status: > 8 hours  NPO Liquid Status: > 2 hours           Airway   Mallampati: I  TM distance: >3 FB  Neck ROM: full  No difficulty expected  Dental      Pulmonary - negative pulmonary ROS and normal exam    breath sounds clear to auscultation  Cardiovascular - normal exam  Exercise tolerance: good (4-7 METS)    Rhythm: regular  Rate: normal    (+) hyperlipidemia      Neuro/Psych  (+) psychiatric history Depression  GI/Hepatic/Renal/Endo    (+) thyroid problem     Musculoskeletal     Abdominal    Substance History - negative use     OB/GYN negative ob/gyn ROS         Other   arthritis,     ROS/Med Hx Other: hx of palpitations, AROLDO anxiety, thryoid disease.  mets>4. no cp/soa. [facility]                     Anesthesia Plan    ASA 2     general     (Patient understands anesthesia not responsible for dental damage.)  intravenous induction     Anesthetic plan, risks, benefits, and alternatives have been provided, discussed and informed consent has been obtained with: patient.    Use of blood products discussed with patient .    Plan discussed with CRNA.        CODE STATUS:
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
## 2024-04-25 VITALS
OXYGEN SATURATION: 96 % | HEART RATE: 72 BPM | WEIGHT: 225.09 LBS | HEIGHT: 62 IN | DIASTOLIC BLOOD PRESSURE: 58 MMHG | SYSTOLIC BLOOD PRESSURE: 119 MMHG | TEMPERATURE: 98.1 F | RESPIRATION RATE: 16 BRPM | BODY MASS INDEX: 41.42 KG/M2

## 2024-04-25 LAB
HCT VFR BLD AUTO: 35.5 % (ref 34–46.6)
HGB BLD-MCNC: 10.8 G/DL (ref 12–15.9)

## 2024-04-25 PROCEDURE — 97530 THERAPEUTIC ACTIVITIES: CPT

## 2024-04-25 PROCEDURE — 94799 UNLISTED PULMONARY SVC/PX: CPT

## 2024-04-25 PROCEDURE — 25010000002 KETOROLAC TROMETHAMINE PER 15 MG: Performed by: ORTHOPAEDIC SURGERY

## 2024-04-25 PROCEDURE — 25010000002 CEFAZOLIN SODIUM 2 G RECONSTITUTED SOLUTION: Performed by: ORTHOPAEDIC SURGERY

## 2024-04-25 PROCEDURE — 97150 GROUP THERAPEUTIC PROCEDURES: CPT

## 2024-04-25 PROCEDURE — 97535 SELF CARE MNGMENT TRAINING: CPT

## 2024-04-25 PROCEDURE — 85018 HEMOGLOBIN: CPT | Performed by: ORTHOPAEDIC SURGERY

## 2024-04-25 PROCEDURE — 99238 HOSP IP/OBS DSCHRG MGMT 30/<: CPT | Performed by: FAMILY MEDICINE

## 2024-04-25 PROCEDURE — 97116 GAIT TRAINING THERAPY: CPT

## 2024-04-25 PROCEDURE — 85014 HEMATOCRIT: CPT | Performed by: ORTHOPAEDIC SURGERY

## 2024-04-25 PROCEDURE — 97165 OT EVAL LOW COMPLEX 30 MIN: CPT

## 2024-04-25 RX ORDER — MELATONIN
1000 DAILY
COMMUNITY

## 2024-04-25 RX ORDER — OXYCODONE AND ACETAMINOPHEN 7.5; 325 MG/1; MG/1
1-2 TABLET ORAL EVERY 4 HOURS PRN
Qty: 40 TABLET | Refills: 0 | Status: SHIPPED | OUTPATIENT
Start: 2024-04-25 | End: 2024-04-29 | Stop reason: SDUPTHER

## 2024-04-25 RX ORDER — MULTIPLE VITAMINS W/ MINERALS TAB 9MG-400MCG
1 TAB ORAL DAILY
COMMUNITY

## 2024-04-25 RX ORDER — ASPIRIN 325 MG
325 TABLET, DELAYED RELEASE (ENTERIC COATED) ORAL DAILY
Qty: 14 TABLET | Refills: 0 | Status: SHIPPED | OUTPATIENT
Start: 2024-05-10

## 2024-04-25 RX ORDER — NALOXONE HYDROCHLORIDE 4 MG/.1ML
SPRAY NASAL
Qty: 2 EACH | Refills: 0 | Status: SHIPPED | OUTPATIENT
Start: 2024-04-25

## 2024-04-25 RX ADMIN — LEVOTHYROXINE SODIUM 25 MCG: 0.03 TABLET ORAL at 06:16

## 2024-04-25 RX ADMIN — OXYCODONE HYDROCHLORIDE AND ACETAMINOPHEN 1 TABLET: 7.5; 325 TABLET ORAL at 07:20

## 2024-04-25 RX ADMIN — OXYCODONE HYDROCHLORIDE AND ACETAMINOPHEN 2 TABLET: 7.5; 325 TABLET ORAL at 03:20

## 2024-04-25 RX ADMIN — OXYCODONE HYDROCHLORIDE AND ACETAMINOPHEN 2 TABLET: 7.5; 325 TABLET ORAL at 11:19

## 2024-04-25 RX ADMIN — APIXABAN 2.5 MG: 2.5 TABLET, FILM COATED ORAL at 09:11

## 2024-04-25 RX ADMIN — KETOROLAC TROMETHAMINE 15 MG: 15 INJECTION, SOLUTION INTRAMUSCULAR; INTRAVENOUS at 04:58

## 2024-04-25 RX ADMIN — KETOROLAC TROMETHAMINE 15 MG: 15 INJECTION, SOLUTION INTRAMUSCULAR; INTRAVENOUS at 11:05

## 2024-04-25 RX ADMIN — CEFAZOLIN 2 G: 2 INJECTION, POWDER, FOR SOLUTION INTRAVENOUS at 00:22
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
## 2024-04-25 NOTE — PLAN OF CARE
Goal Outcome Evaluation:  Plan of Care Reviewed With: patient        Progress: no change  Outcome Evaluation: pt. is a one person assist with walker and has ambulated 145 feet this shift.  voiding without difficulty.  pt. medicated with prn and scheduled pain medication with relief noted.  upon discharge pt. is going home with outpt therapy.

## 2024-04-25 NOTE — PROGRESS NOTES
[facility]     Progress Note    Patient Name: Soheila Griffin  : 1965  MRN: [id number]  Primary Care Physician:  Brittnee Kovacs PA-C  Date of admission: 2024    Subjective   Subjective     HPI:  Patient Reports doing well this morning.  Her pain is controlled.  She denies any chest pain or shortness of air.    Review of Systems   See HPI    Objective   Objective     Vitals:   Temp:  [96.9 °F (36.1 °C)-98.8 °F (37.1 °C)] 98.2 °F (36.8 °C)  Heart Rate:  [64-99] 65  Resp:  [13-20] 16  BP: ()/(42-90) 118/90  Flow (L/min):  [2-3] 2  Physical Exam    General: Alert, no acute distress   Chest: Unlabored breathing, cardiovascular: Regular heart rate   Musculoskeletal: Neurovascular intact extremity.  Dressing intact without significant drainage.  Positive pulses.  Negative Tyler.    Result Review      Hemoglobin   Date Value Ref Range Status   2024 10.8 (L) 12.0 - 15.9 g/dL Final     Hematocrit   Date Value Ref Range Status   2024 35.5 34.0 - 46.6 % Final        Result Review:  I have personally reviewed the results from the time of this admission to 2024 07:41 EDT and agree with these findings:  [x]  Laboratory  []  Microbiology  [x]  Radiology  []  EKG/Telemetry   []  Cardiology/Vascular   []  Pathology  []  Old records  []  Other:      Assessment & Plan   Assessment / Plan     Brief Patient Summary:  Soheila Griffin is a 58 y.o. female who is status post left total knee arthroplasty    Active Hospital Problems:  Active Hospital Problems    Diagnosis     **Osteoarthritis of left knee      Plan: Weightbearing as tolerated with walker  Physical and Occupational Therapy  Pain control  DVT prophylaxis  Discharge planning: Discharge home later today       DVT prophylaxis:  Medical and mechanical DVT prophylaxis orders are present.        CODE STATUS:   Code Status (Patient has no pulse and is not breathing): CPR (Attempt to Resuscitate)  Medical Interventions (Patient has pulse  or is breathing): Full Support    Disposition:  I expect patient to be discharged later today.    Electronically signed by Hardeep Acevedo MD, 04/25/24, 7:41 AM EDT.
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
## 2024-04-25 NOTE — DISCHARGE SUMMARY
[facility]         HOSPITALIST  DISCHARGE SUMMARY    Patient Name: Soheila Griffin  : 1965  MRN: [id number]    Date of Admission: 2024  Date of Discharge: 2024  Primary Care Physician: Brittnee Kovacs PA-C    Consults       Date and Time Order Name Status Description    2024  3:28 PM Inpatient Hospitalist Consult              Active and Resolved Hospital Problems:  Postop left knee pain  Primary osteoarthritis of the left knee status post left total knee arthroplasty  Obstructive sleep apnea on CPAP  Hypothyroidism  Anxiety  Seasonal allergies  Active Hospital Problems    Diagnosis POA    **Osteoarthritis of left knee [M17.12] Unknown      Resolved Hospital Problems   No resolved problems to display.       Hospital Course     Hospital Course:  Soheila Griffin is a 58 y.o. female  past medical history for anxiety, hypothyroidism, obstructive sleep apnea on CPAP, primary osteoarthritis of the left knee presents following left total knee arthroplasty.  Patient tolerated procedure well.  Postoperatively patient is having issues with pain control in the operative knee despite IV Dilaudid.  Patient was able to ambulate from the gurney to the bedside chair though was having some buckling of the operative knee so required 2 person assist.  Patient is afebrile heart rate within normal limits.  Blood pressure low normal limits.  Satting well on 2 L nasal cannula.  Preop labs reviewed and within normal limits.  No other issues per nursing.  Patient worked well with physical therapy Occupational Therapy.  Pain control improved with oral analgesics.  Patient seen and evaluated on day of discharge and thought stable for discharge home with outpatient therapy on discharge.         DISCHARGE Follow Up Recommendations for labs and diagnostics: As above      Day of Discharge     Vital Signs:  Temp:  [97.3 °F (36.3 °C)-98.8 °F (37.1 °C)] 98.1 °F (36.7 °C)  Heart Rate:  [64-98] 72  Resp:   [13-18] 16  BP: ()/(42-90) 119/58  Flow (L/min):  [2-3] 2  Physical Exam:   Gen. well-developed appearing stated age in no acute distress  HEENT: Normocephalic atraumatic moist membranes pupils equal round reactive light, no scleral icterus no conjunctival injection  Cardiovascular: regular rate and rhythm no murmurs rubs or gallops S1-S2, no lower extremity edema appreciated  Pulmonary: Clear to auscultation bilaterally no wheezes rales or rhonchi symmetric chest expansion, unlabored, no conversational dyspnea appreciated  Gastrointestinal: Soft nontender nondistended positive bowel sounds all 4 quadrants no rebound or guarding  Musculoskeletal: No clubbing cyanosis, warm and well-perfused, calves soft symmetric nontender bilaterally, surgical dressing clean dry intact over the left knee  Skin: Clean dry without rashes  Neuro: Cranial nerves II through XII intact grossly no sensorimotor deficits appreciated bilateral upper and lower extremities  Psych: Patient is calm cooperative and appropriate with exam not responding to internal stimuli  : No Ray catheter no bladder distention no suprapubic tenderness       Discharge Details        Discharge Medications        New Medications        Instructions Start Date   apixaban 2.5 MG tablet tablet  Commonly known as: ELIQUIS   2.5 mg, Oral, 2 Times Daily      aspirin 325 MG EC tablet  Commonly known as: Ecotrin   325 mg, Oral, Daily, Start after Eliquis is completed.   Start Date: May 10, 2024     naloxone 4 MG/0.1ML nasal spray  Commonly known as: NARCAN   Call 911. Don't prime. [place] in 1 nostril for overdose. Repeat in 2-3 minutes in other nostril if no or minimal breathing/responsiveness.      oxyCODONE-acetaminophen 7.5-325 MG per tablet  Commonly known as: PERCOCET   1-2 tablets, Oral, Every 4 Hours PRN             Changes to Medications        Instructions Start Date   cetirizine 10 MG tablet  Commonly known as: zyrTEC  What changed: when to take this    10 mg, Oral, Daily      citalopram 20 MG tablet  Commonly known as: CeleXA  What changed: when to take this   20 mg, Oral, Daily             Continue These Medications        Instructions Start Date   cholecalciferol 25 MCG (1000 UT) tablet  Commonly known as: VITAMIN D3   1,000 Units, Oral, Daily      multivitamin with minerals tablet tablet   1 tablet, Oral, Daily      Synthroid 25 MCG tablet  Generic drug: levothyroxine   25 mcg, Oral, Daily               Allergies   Allergen Reactions    Amoxicillin Rash     CHILDHOOD RX    Diclofenac Nausea Only and Dizziness    Metal [Nickel] Itching, Swelling and Rash     CHEAP JEWELRY     Oxycodone-Acetaminophen Palpitations    Sulfamethoxazole-Trimethoprim Photosensitivity       Discharge Disposition:  Home or Self Care    Diet:  Hospital:  Diet Order   Procedures    Diet: Regular/House; Fluid Consistency: Thin (IDDSI 0)       Discharge Activity:   Activity Instructions       Discharge Activity      May discharge home  Follow-up 2 weeks  Call with any problems  Percocet prescription  May start Ecotrin when Eliquis completed  New Aquacel postoperative #3 then leave 5 to 7 days.  May shower with Aquacel in place.  Tibial pin site dressing change daily.  Weightbearing as tolerated with walker, range of motion as tolerated  Physical therapy    Gradually Increase Activity Until at Pre-Hospitalization Level              CODE STATUS:  Code Status and Medical Interventions:   Ordered at: 04/24/24 1732     Code Status (Patient has no pulse and is not breathing):    CPR (Attempt to Resuscitate)     Medical Interventions (Patient has pulse or is breathing):    Full Support         Future Appointments   Date Time Provider Department Center   4/26/2024 10:00 AM Rachel Hercules PT [facility] PT RADCL [facility]   5/1/2024  9:15 AM Brittnee Kovacs PA-C [facility] IMP RADC [facility]   5/7/2024 10:45 AM Eduardo Hood PA [facility] ORS WOOD [facility]   12/2/2024 10:00 AM Gianna Weber MD [facility] OBG ETWN [facility]        Additional Instructions for the Follow-ups that You Need to Schedule       Discharge Follow-up with Specified Provider: Erwin BLACKMAN; 2 Weeks   As directed      To: Erwin BLACKMAN   Follow Up: 2 Weeks                Pertinent  and/or Most Recent Results     PROCEDURES:         Hardeep Acevedo MD   Physician  Orthopedics     Op Note     Signed     Date of Service: 04/24/24 1250  Creation Time: 04/24/24 1409  Case Time: Procedures: Surgeons:   04/24/24 1250 LEFT TOTAL KNEE ARTHROPLASTY WITH PINEDA ROBOT    Hardeep Acevedo MD               Signed         TOTAL KNEE ARTHROPLASTY WITH PINEDA ROBOT  Procedure Report     Patient Name:  Soheila Griffin  YOB: 1965     Date of Surgery:  4/24/2024     Indications:  The patient has had persistent knee pain and x-rays reveal advanced osteoarthritis.  They wish to proceed with operative treatment.  Risks and benefits of surgery were discussed.  Risk of bleeding, infection, damage to neurovascular structures, heart attack, stroke, DVT/PE, anesthesia complications including death, stiffness, instability, periprosthetic fracture, deep infection, among others were discussed.  Informed consent was obtained and they wish to proceed.        Pre-op Diagnosis:   Primary osteoarthritis of left knee [M17.12]       Post-Op Diagnosis Codes:     * Primary osteoarthritis of left knee [M17.12]     Procedure/CPT® Codes:        Procedure(s):  LEFT TOTAL KNEE ARTHROPLASTY WITH PIENDA ROBOT     Staff:  Surgeon(s):  Hardeep Acevedo MD     Assistant: Eduardo Hood PA     Surgical Approach: Knee Medial Parapatellar           Anesthesia: General with Block     Estimated Blood Loss: 75 mL     Implants:    Implant Name Type Inv. Item Serial No.  Lot No. LRB No. Used Action   CMT BONE PALACOS R HI/VISC 1X40 - [id number] Implant CMT BONE PALACOS R HI/VISC 1X40   [facility] [id number] Left 2 Implanted   PAT MAIRA METZ [facility] [id number] - [id number]  Implant PAT KN PERSONA ALLPOLY CMT 8X29MM   [employer] [id number] Left 1 Implanted   EXT STEM FEM/KN PERSONA TPR [id number] - [id number] Implant EXT STEM FEM/KN PERSONA TPR [id number]   [employer] [id number] Left 1 Implanted   STEM TIB/KN PERSONA CMT 5D SZD LT - [id number] Implant STEM TIB/KN PERSONA CMT 5D SZD LT   [employer] [id number] Left 1 Implanted   COMP FEM/KN PERSONA TIVANIUM CR CMT STD SZ8 LT - [id number] Implant COMP FEM/KN PERSONA TIVANIUM CR CMT STD SZ8 LT   [employer] [id number] Left 1 Implanted   ART/SRF KN PERSONA/VE PS CD 8TO9 10MM LT - [id number] Implant ART/SRF KN PERSONA/VE PS CD 8TO9 10MM LT   [employer] [id number] Left 1 Implanted         Specimen:          None         Findings: Knee osteoarthritis     Complications: None     Description of Procedure: The patient was brought to the operating room and placed supine on the OR table.  General anesthesia was given.  Preoperatively, the patient received an adductor canal nerve block. The patient was placed supine on the OR table.  All bony prominences were padded. The tourniquet was placed on the thigh. The affected lower extremity was prepped and draped in the usual sterile fashion. Preoperative antibiotic was given. Tranexamic acid intravenously was given at the beginning and the end of the surgery.  At this point, an Esmarch was used to exsanguinate the limb and the tourniquet was inflated. A longitudinal incision was made over the knee and a medial parapatellar arthrotomy was performed.  Appropriate releases were performed to the proximal medial tibia. The pre-patellar fat pad was excised.  The patella was subluxed laterally and the knee was examined. There was tricompartmental wear and osteophyte formation.  The medial and lateral menisci were removed.  Osteophytes of  the femur were debrided.     At this point 2 threaded guidepins were placed at the distal femur and the tracking sensor for the femur for the besomebody. robot were placed.  2  percutaneous stab incisions were made in the proximal tibial shaft and 2 guidepins were placed for the tibial tracking censor was placed.  We then registered the mechanical axis and femoral and tibial landmarks for the Jenni Robot.  We then assessed the flexion extension gaps and the flexion and extension deformity.  We made our surgical plan and then executed the distal femoral cut, the 4-in-1 femoral cut and the tibial cut. The patella was then everted, resected, and sized.  Drill holes for the patella was made.  At this point the spacer block was placed in flexion extension and fit well.     We then placed the knee in flexion where laminar spreaders were used to open the flexion gaps.  The menisci were removed.  Osteophytes were removed from the posterior femur. The posterior capsule was injected with anesthetic cocktail.  At this point, the appropriately sized tibial tray was chosen.  This was pinned into place in line with the medial one third of the tibial tubercle. We then placed the trial femur. The trial insert was placed and the knee was brought to full extension. It was stable to varus and valgus stress.   The knee was then trialed with range of motion again. The femoral drill holes were made. The tibia was finished with the drill and the punch.  The components were removed. The knee was irrigated and dried. The cement was mixed and the tibia and femur were cemented into place.  The medial congruent spacer was then inserted.  The patella was cemented into place. The knee was irrigated with Irrisept and normal saline Pulsavac lavage.  The medial congruent polyethylene was inserted. The knee was stable to varus and valgus stress. There was good balance to the ligaments medially and laterally. There was good flexion with a stable patella. At this point, the tourniquet was released.  There was minimal bleeding controlled with electrocautery.   The arthrotomy was closed with #1 Vicryl at 30 degrees of flexion,  the subcutaneous tissues with 2-0 Vicryl, and the skin with staples.  The percutaneous stab incisions on the tibia were closed with 3-0 nylon in horizontal mattress fashion.  These incisions were covered with Xeroform, 4 x 4, Tegaderm.  An Aquacel dressing was placed and an Ace wrap was placed. Patient awoke from anesthesia in stable condition. There were no complications. All counts were correct.         Assistant: Eduardo Hood PA  was responsible for performing the following activities: Retraction, Suction, Irrigation, and Placing Dressing and their skilled assistance was necessary for the success of this case.     Hardeep Acevedo MD      Date: 4/24/2024  Time: 14:09 EDT                         LAB RESULTS:      Lab 04/25/24  0344   HEMOGLOBIN 10.8*   HEMATOCRIT 35.5                             Brief Urine Lab Results       None          Microbiology Results (last 10 days)       ** No results found for the last 240 hours. **            XR Knee 1 or 2 View Left    Result Date: 4/24/2024  Impression: Impression: 1.  Status post knee arthroplasty. 2.  No immediate postoperative complication is identified.   Electronically Signed By-Yobani Sanchez MD On:4/24/2024 2:38 PM                   Labs Pending at Discharge:        Time spent on Discharge including face to face service: 25 minutes    Electronically signed by Jonny Guzman MD, 04/25/24, 1:49 PM EDT.

## 2024-04-25 NOTE — SIGNIFICANT NOTE
04/25/24 0757   Plan   Final Discharge Disposition Code 01 - home or self-care   Final Note PAVITHRA Mar. Appt: 4/26/24 at 10AM.

## 2024-04-26 ENCOUNTER — TREATMENT (OUTPATIENT)
Dept: PHYSICAL THERAPY | Facility: CLINIC | Age: 59
End: 2024-04-26
Payer: OTHER GOVERNMENT

## 2024-04-26 DIAGNOSIS — M25.562 ACUTE PAIN OF LEFT KNEE: ICD-10-CM

## 2024-04-26 DIAGNOSIS — Z96.652 AFTERCARE FOLLOWING LEFT KNEE JOINT REPLACEMENT SURGERY: Primary | ICD-10-CM

## 2024-04-26 DIAGNOSIS — M25.662 DECREASED RANGE OF MOTION (ROM) OF LEFT KNEE: ICD-10-CM

## 2024-04-26 DIAGNOSIS — Z47.1 AFTERCARE FOLLOWING LEFT KNEE JOINT REPLACEMENT SURGERY: Primary | ICD-10-CM

## 2024-04-26 PROCEDURE — 97110 THERAPEUTIC EXERCISES: CPT | Performed by: PHYSICAL THERAPIST

## 2024-04-26 PROCEDURE — 97161 PT EVAL LOW COMPLEX 20 MIN: CPT | Performed by: PHYSICAL THERAPIST

## 2024-04-26 PROCEDURE — 97140 MANUAL THERAPY 1/> REGIONS: CPT | Performed by: PHYSICAL THERAPIST

## 2024-04-26 NOTE — PROGRESS NOTES
Physical Therapy Initial Evaluation and Plan of Care  [address], [address] [place] [address]        Patient: Soheila Griffin   : 1965  Diagnosis/ICD-10 Code:  Aftercare following left knee joint replacement surgery [Z47.1, Z96.652]  Referring practitioner: Hardeep Acevedo MD  Date of Initial Visit: 2024  Today's Date: 2024  Patient seen for 6 sessions           Subjective Questionnaire: LEFS:       Subjective Evaluation    History of Present Illness  Mechanism of injury: Pt is s/p L TKR 24.  Pt reports that she has 1 stair to enter home and stairs to the basement.  Pt is requiring assistance from spouse for all ADLs.  Pt reports that she is taking hydrocodone and is using ice machine.  Pt currently is using RW and was using a cane intermittently prior to sx.  Pt is not currently working.  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      Pain  Current pain ratin  At best pain rating: 3  At worst pain rating: 10  Quality: dull ache, sharp, tight, pulling and burning  Relieving factors: medications and ice  Aggravating factors: ambulation, stairs, prolonged positioning, repetitive movement, sleeping, standing and movement    Social Support  Lives with: spouse    Treatments  Previous treatment: physical therapy  Discharged from (in last 30 days): inpatient hospitalization  Patient Goals  Patient goals for therapy: decreased edema, decreased pain, increased motion, increased strength, independence with ADLs/IADLs and return to sport/leisure activities             Objective          Static Posture   General Observations  Shifted right.     Head  Forward.    Shoulders  Rounded.    Hip   Hip (Left): Externally rotated and increased flexion.     Knee   Knee (Left): Flexed.     Tenderness   Left Knee   Tenderness in the lateral joint line and medial joint line.     Active Range of Motion   Left Knee   Flexion: 53 degrees with pain  Extension:  19 (lacking 0) degrees with pain    Passive Range of Motion   Left Knee   Flexion: 78 (limited by muscle guarding/pain) degrees with pain  Extension: 10 (limited by pain/muscle guarding) degrees     Strength/Myotome Testing     Left Hip   Planes of Motion   Flexion: 2+    Right Hip   Planes of Motion   Flexion: 4+    Left Knee   Flexion: 3+  Extension: 2+  Quadriceps contraction: poor    Right Knee   Flexion: 5  Extension: 5    Left Ankle/Foot   Dorsiflexion: 4+    Right Ankle/Foot   Dorsiflexion: 5    Ambulation     Observational Gait   Gait: antalgic   Decreased walking speed.     Additional Observational Gait Details  Pt ambulating with a rolling walker and demonstrates significant antalgic gait on L LE with this decreased stance time, heel strike, push off, decreased knee flexion during swing phase and increased UE WB on AD.     General Comments     Knee Comments  Moderate L knee swelling noted  No signs/symptoms of DVT or infection noted           Assessment & Plan       Assessment  Impairments: abnormal gait, abnormal muscle tone, abnormal or restricted ROM, activity intolerance, impaired balance, impaired physical strength, lacks appropriate home exercise program and pain with function   Functional limitations: lifting, sleeping, walking, uncomfortable because of pain, sitting, standing and unable to perform repetitive tasks   Assessment details: Patient presents with s/p L TKR and demonstrates decreased L knee ROM, bilateral hip and L knee weakness, decreased quad activation, gait abnormalities and reports of pain limiting function during ADLs as shown on the LEFS.  Pt limited in all manual therapy/exercises today due to pain.  Pt educated on need to regain ROM as quickly as possible to maximize progress towards pain/functional goals.  Patient would benefit from skilled PT services in order to address deficits limiting function at this time.  HEP was given to patient this session and education on  HEP/diagnosis provided to patient.             Goals  Plan Goals: 1. The patient has limited ROM of the L knee.   LTG 1: 12 weeks:  The patient will demonstrate 0 to 120 degrees of ROM for the L knee in order to allow patient to complete prolonged walking, standing, stairs and other ADLs with decreased pain/difficulty.    STATUS:  New   STG 1a:  6 weeks:  The patient will demonstrate 5 to 105 degrees of ROM for the L knee.    STATUS:  New    2. The patient has limited strength of the left knee.   LTG 2: 12 weeks: The patient will demonstrate 4+/5 strength for L knee flexion and extension in order to allow patient improved joint stability    STATUS:  New   STG 2a: 6 weeks: The patient will demonstrate 4/5 strength for L knee flexion and extension    STATUS:  New      3. The patient has gait dysfunction.   LTG 3: 12 weeks:  The patient will ambulate without assistive device, independently, for community distances with minimal limp to the L lower extremity in order to improve mobility and allow patient to perform activities such as grocery shopping with greater ease.    STATUS:  New    4. Mobility: Walking/Moving Around Functional Limitation     LTG 4: 12 weeks:  The patient will demonstrate 19 % limitation by achieving a score of 65 on the LEFS.    STATUS:  New   STG 4 a: 6 weeks:  The patient will demonstrate 50 % limitation by achieving a score of 40 on the LEFS.      STATUS:  New    Plan  Therapy options: will be seen for skilled therapy services  Planned modality interventions: TENS, electrical stimulation/Russian stimulation, thermotherapy (hydrocollator packs) and cryotherapy  Planned therapy interventions: manual therapy, ADL retraining, balance/weight-bearing training, neuromuscular re-education, body mechanics training, postural training, soft tissue mobilization, flexibility, spinal/joint mobilization, functional ROM exercises, strengthening, gait training, stretching, home exercise program, therapeutic  activities, transfer training and joint mobilization  Frequency: 3x week (2-3)  Duration in weeks: 12  Treatment plan discussed with: patient        Timed:         Manual Therapy:    10     mins  47139;     Therapeutic Exercise:    15     mins  20167;     Neuromuscular Antonio:    0    mins  90944;    Therapeutic Activity:     0     mins  61505;     Gait Trainin     mins  48347;     Ultrasound:     0     mins  20142;    Ionto                               0    mins   76288  Self-care  __0__ mins 58088    Un-Timed:  Electrical Stimulation:    0     mins  95166 ( );  Traction     0     mins 20551  Low Eval     25     Mins  58850  Mod Eval     0     Mins  88736  High Eval                       0     Mins  38224  Hot pack     0     Mins    Cold pack                       0     Mins      Timed Treatment:   25   mins   Total Treatment:     50   mins    PT SIGNATURE: Rachel Hercules PT      Electronically signed 2024  [place] License: [id number]    Initial Certification    Certification Period: 2024 thru 2024  NPI: [id number]  I certify that the therapy services are furnished while this patient is under my care.  The services outlined above are required by this patient, and will be reviewed every 90 days.     PHYSICIAN: Hardeep Acevedo MD      DATE:     Please sign and return via fax to [phone].. Thank you, [facility].

## 2024-04-29 ENCOUNTER — TELEPHONE (OUTPATIENT)
Dept: ORTHOPEDIC SURGERY | Facility: CLINIC | Age: 59
End: 2024-04-29
Payer: OTHER GOVERNMENT

## 2024-04-29 ENCOUNTER — TREATMENT (OUTPATIENT)
Dept: PHYSICAL THERAPY | Facility: CLINIC | Age: 59
End: 2024-04-29
Payer: OTHER GOVERNMENT

## 2024-04-29 DIAGNOSIS — Z96.652 AFTERCARE FOLLOWING LEFT KNEE JOINT REPLACEMENT SURGERY: Primary | ICD-10-CM

## 2024-04-29 DIAGNOSIS — Z47.1 AFTERCARE FOLLOWING LEFT KNEE JOINT REPLACEMENT SURGERY: Primary | ICD-10-CM

## 2024-04-29 DIAGNOSIS — M25.662 DECREASED RANGE OF MOTION (ROM) OF LEFT KNEE: ICD-10-CM

## 2024-04-29 DIAGNOSIS — M17.12 PRIMARY OSTEOARTHRITIS OF LEFT KNEE: ICD-10-CM

## 2024-04-29 DIAGNOSIS — M25.562 ACUTE PAIN OF LEFT KNEE: ICD-10-CM

## 2024-04-29 PROCEDURE — 97140 MANUAL THERAPY 1/> REGIONS: CPT | Performed by: PHYSICAL THERAPIST

## 2024-04-29 PROCEDURE — 97530 THERAPEUTIC ACTIVITIES: CPT | Performed by: PHYSICAL THERAPIST

## 2024-04-29 PROCEDURE — 97110 THERAPEUTIC EXERCISES: CPT | Performed by: PHYSICAL THERAPIST

## 2024-04-29 RX ORDER — OXYCODONE AND ACETAMINOPHEN 7.5; 325 MG/1; MG/1
1-2 TABLET ORAL EVERY 4 HOURS PRN
Qty: 40 TABLET | Refills: 0 | Status: SHIPPED | OUTPATIENT
Start: 2024-04-29 | End: 2024-04-30 | Stop reason: SDUPTHER

## 2024-04-29 NOTE — TELEPHONE ENCOUNTER
PATIENT CALLED AND STATES THAT  WILL NOT APPROVE THE PAIN MEDICATION.  STATES 1-2 EVERY 4 HOURS NOT ALLOWED.  SHOULD BE A MAX OF 8 A DAY.  PLEASE RESEND.

## 2024-04-29 NOTE — PROGRESS NOTES
Physical Therapy Daily Treatment Note  [address], [address], [place] [address]      Patient: Soheila Griffin   : 1965  Diagnosis/ICD-10 Code:  Aftercare following left knee joint replacement surgery [Z47.1, Z96.652]  Referring practitioner: Hardeep Acevedo MD  Date of Initial Visit: Type: THERAPY  Noted: 2024  Today's Date: 2024  Patient seen for 2 sessions             Subjective   Soheila Griffin reports: 5/10 L knee pain at beginning of session.    Objective   L quad contraction: poor activation  See Exercise, Manual, and Modality Logs for complete treatment.       Assessment/Plan  Patient tolerated all exercise progressions and manual therapy well today but continues to demonstrate L knee strength/ROM deficits limiting function. Pt is limited in all exercises due to poor quad activation.  Continue to progress per patient tolerance.    Progress per Plan of Care           Timed:  Manual Therapy:    10     mins  94099;  Therapeutic Exercise:    20     mins  80833;     Neuromuscular Antonio:    0    mins  97735;    Therapeutic Activity:     8     mins  24662;     Gait Trainin     mins  50907;     Ultrasound:     0     mins  99451;    Electrical Stimulation:    0     mins  07361;  Iontophoresis     0     mins  49316    Untimed:  Electrical Stimulation:    0     mins  92934 ( );  Mechanical Traction:    0     mins  54814;   Fluidotherapy     0     mins  44927  Hot pack     0     Mins    Cold pack                       0     Mins     Timed Treatment:   38   mins   Total Treatment:     46   mins        Rachel Hercules PT    Electronically signed [unfilled]  [place] License: [id number]  NPI number: [id number]

## 2024-04-30 RX ORDER — OXYCODONE AND ACETAMINOPHEN 7.5; 325 MG/1; MG/1
1 TABLET ORAL EVERY 4 HOURS PRN
Qty: 42 TABLET | Refills: 0 | Status: SHIPPED | OUTPATIENT
Start: 2024-04-30 | End: 2024-05-02 | Stop reason: SDUPTHER

## 2024-05-01 ENCOUNTER — OFFICE VISIT (OUTPATIENT)
Dept: INTERNAL MEDICINE | Facility: CLINIC | Age: 59
End: 2024-05-01
Payer: OTHER GOVERNMENT

## 2024-05-01 ENCOUNTER — TREATMENT (OUTPATIENT)
Dept: PHYSICAL THERAPY | Facility: CLINIC | Age: 59
End: 2024-05-01
Payer: OTHER GOVERNMENT

## 2024-05-01 VITALS
DIASTOLIC BLOOD PRESSURE: 62 MMHG | TEMPERATURE: 97.1 F | RESPIRATION RATE: 18 BRPM | OXYGEN SATURATION: 98 % | HEIGHT: 62 IN | WEIGHT: 228 LBS | BODY MASS INDEX: 41.96 KG/M2 | HEART RATE: 79 BPM | SYSTOLIC BLOOD PRESSURE: 128 MMHG

## 2024-05-01 DIAGNOSIS — F41.1 GENERALIZED ANXIETY DISORDER: ICD-10-CM

## 2024-05-01 DIAGNOSIS — F33.1 MAJOR DEPRESSIVE DISORDER, RECURRENT EPISODE, MODERATE DEGREE: ICD-10-CM

## 2024-05-01 DIAGNOSIS — M25.561 CHRONIC PAIN OF RIGHT KNEE: ICD-10-CM

## 2024-05-01 DIAGNOSIS — Z96.652 AFTERCARE FOLLOWING LEFT KNEE JOINT REPLACEMENT SURGERY: Primary | ICD-10-CM

## 2024-05-01 DIAGNOSIS — Z47.1 AFTERCARE FOLLOWING LEFT KNEE JOINT REPLACEMENT SURGERY: Primary | ICD-10-CM

## 2024-05-01 DIAGNOSIS — M25.562 ACUTE PAIN OF LEFT KNEE: ICD-10-CM

## 2024-05-01 DIAGNOSIS — Z96.652 STATUS POST TOTAL KNEE REPLACEMENT, LEFT: ICD-10-CM

## 2024-05-01 DIAGNOSIS — E03.9 HYPOTHYROIDISM, UNSPECIFIED TYPE: Primary | ICD-10-CM

## 2024-05-01 DIAGNOSIS — G89.29 CHRONIC PAIN OF RIGHT KNEE: ICD-10-CM

## 2024-05-01 DIAGNOSIS — M25.662 DECREASED RANGE OF MOTION (ROM) OF LEFT KNEE: ICD-10-CM

## 2024-05-01 PROBLEM — S93.402A SPRAIN OF LEFT ANKLE: Status: RESOLVED | Noted: 2023-06-08 | Resolved: 2024-05-01

## 2024-05-01 PROBLEM — J02.9 SORE THROAT: Status: RESOLVED | Noted: 2024-02-13 | Resolved: 2024-05-01

## 2024-05-01 PROBLEM — R05.1 ACUTE COUGH: Status: RESOLVED | Noted: 2024-02-13 | Resolved: 2024-05-01

## 2024-05-01 PROBLEM — S83.207A TEAR OF MENISCUS OF LEFT KNEE AS CURRENT INJURY, UNSPECIFIED MENISCUS, UNSPECIFIED TEAR TYPE, INITIAL ENCOUNTER: Status: RESOLVED | Noted: 2023-11-28 | Resolved: 2024-05-01

## 2024-05-01 PROBLEM — S83.242A ACUTE MEDIAL MENISCUS TEAR OF LEFT KNEE: Status: RESOLVED | Noted: 2023-11-28 | Resolved: 2024-05-01

## 2024-05-01 PROBLEM — R52 BODY ACHES: Status: RESOLVED | Noted: 2024-02-13 | Resolved: 2024-05-01

## 2024-05-01 PROCEDURE — 99214 OFFICE O/P EST MOD 30 MIN: CPT | Performed by: PHYSICIAN ASSISTANT

## 2024-05-01 RX ORDER — LEVOTHYROXINE SODIUM 0.03 MG/1
25 TABLET ORAL
COMMUNITY
End: 2024-05-01 | Stop reason: SDUPTHER

## 2024-05-01 RX ORDER — CITALOPRAM 20 MG/1
20 TABLET ORAL NIGHTLY
Qty: 90 TABLET | Refills: 1 | Status: SHIPPED | OUTPATIENT
Start: 2024-05-01

## 2024-05-01 RX ORDER — LEVOTHYROXINE SODIUM 0.03 MG/1
25 TABLET ORAL
Qty: 90 TABLET | Refills: 1 | Status: SHIPPED | OUTPATIENT
Start: 2024-05-01

## 2024-05-01 RX ORDER — CETIRIZINE HYDROCHLORIDE 10 MG/1
10 TABLET ORAL DAILY
Qty: 90 TABLET | Refills: 1 | Status: SHIPPED | OUTPATIENT
Start: 2024-05-01

## 2024-05-01 NOTE — PROGRESS NOTES
Physical Therapy Daily Treatment Note  [address] [facility], [address], [place] [address]      Patient: Soheila Griffin   : 1965  Diagnosis/ICD-10 Code:  Aftercare following left knee joint replacement surgery [Z47.1, Z96.652]  Referring practitioner: Hardeep Acevedo MD  Date of Initial Visit: Type: THERAPY  Noted: 2024  Today's Date: 2024  Patient seen for 3 sessions             Subjective   Soheila Griffin reports: moderate L knee soreness at beginning of session today.    Objective   Poor L quad activation  See Exercise, Manual, and Modality Logs for complete treatment.       Assessment/Plan  Patient tolerated all exercise progressions and manual therapy well today but continues to demonstrate L knee strength/ROM deficits limiting function. Pt is limited in exercises due to poor quad activation.  Continue to progress per patient tolerance.   Progress per Plan of Care           Timed:  Manual Therapy:    10     mins  45332;  Therapeutic Exercise:    20     mins  31770;     Neuromuscular Antonio:    0    mins  94773;    Therapeutic Activity:     8     mins  82337;     Gait Trainin     mins  49025;     Ultrasound:     0     mins  03902;    Electrical Stimulation:    0     mins  92251;  Iontophoresis     0     mins  62557    Untimed:  Electrical Stimulation:    0     mins  85816 ( );  Mechanical Traction:    0     mins  30497;   Fluidotherapy     0     mins  68334  Hot pack     0     Mins    Cold pack                       0     Mins     Timed Treatment:   38   mins   Total Treatment:     42   mins        Rachel Hercules PT    Electronically signed [unfilled]  [place] License: [id number]  NPI number: [id number]

## 2024-05-01 NOTE — PROGRESS NOTES
"Transitional Care Follow Up Visit  Subjective     Soheila is a 58 y.o. female who presents for a transitional care management visit.    Within 48 business hours after discharge our office contacted her via telephone to coordinate her care and needs.      I reviewed and discussed the details of that call along with the discharge summary, hospital problems, inpatient lab results, inpatient diagnostic studies, and consultation reports with Soheila.     Current outpatient and discharge medications have been reconciled for the patient.  Reviewed by: Brittnee Kovacs PA-C           No data to display                Risk for Readmission (LACE) Score: 1 (4/25/2024  6:00 AM)      History of Present Illness     Pt here for f/u  Admitted 4/24 for L knee replacement   She was d/c to f/u with PT/OT  She has been taking Percocet 2 tabs every 4 hrs to control pain   She has been seeing PT  She would like referral for R knee pain, states it was hurting before L started. Would like ortho to check it out incase it worsens    Depression/anxiety: doing well with celexa  Denies si/hi     Hypothyroid: needs levothyroxine due to insurance        Course During Hospital Stay The following information was reviewed by: Brittnee Kovacs PA-C on 05/01/2024     The following portions of the patient's history were reviewed and updated as appropriate: allergies, current medications, past family history, past medical history, past social history, past surgical history, and problem list.     Vitals:    05/01/24 0919   BP: 128/62   BP Location: Left arm   Patient Position: Sitting   Cuff Size: Large Adult   Pulse: 79   Resp: 18   Temp: 97.1 °F (36.2 °C)   TempSrc: Temporal   SpO2: 98%   Weight: 103 kg (228 lb)   Height: 157.5 cm (62\")       Review of Systems    Objective   Physical Exam  Vitals reviewed.   Constitutional:       Appearance: Normal appearance.   HENT:      Head: Normocephalic and atraumatic.      Nose: Nose normal.      Mouth/Throat:      " Mouth: Mucous membranes are moist.   Eyes:      Extraocular Movements: Extraocular movements intact.      Conjunctiva/sclera: Conjunctivae normal.      Pupils: Pupils are equal, round, and reactive to light.   Cardiovascular:      Rate and Rhythm: Normal rate and regular rhythm.   Pulmonary:      Effort: Pulmonary effort is normal.      Breath sounds: Normal breath sounds.   Abdominal:      General: Abdomen is flat. Bowel sounds are normal.      Palpations: Abdomen is soft.   Musculoskeletal:         General: Normal range of motion.      Left knee: Swelling present. Decreased range of motion.   Neurological:      General: No focal deficit present.      Mental Status: She is alert and oriented to person, place, and time.   Psychiatric:         Mood and Affect: Mood normal.           Assessment & Plan     Diagnoses and all orders for this visit:    1. Hypothyroidism, unspecified type (Primary)  Assessment & Plan:  Cont current dose of synthroid, labs at f/u      2. Major depressive disorder, recurrent episode, moderate degree  Assessment & Plan:  Stable on current medicine       3. Generalized anxiety disorder    4. Status post total knee replacement, left  Comments:  Cont follow up with PT and ortho as directed. Ortho rx pain meds. Discussed taking as prescribed.    5. Chronic pain of right knee  Comments:  referred to ortho per pt request  Orders:  -     Ambulatory Referral to Orthopedic Surgery    Other orders  -     levothyroxine (SYNTHROID, LEVOTHROID) 25 MCG tablet; Take 1 tablet by mouth Every Morning.  Dispense: 90 tablet; Refill: 1  -     citalopram (CeleXA) 20 MG tablet; Take 1 tablet by mouth Every Night.  Dispense: 90 tablet; Refill: 1  -     cetirizine (zyrTEC) 10 MG tablet; Take 1 tablet by mouth Daily.  Dispense: 90 tablet; Refill: 1        Follow Up     Return in about 3 months (around 8/1/2024).

## 2024-05-02 ENCOUNTER — TELEPHONE (OUTPATIENT)
Dept: ORTHOPEDIC SURGERY | Facility: CLINIC | Age: 59
End: 2024-05-02
Payer: OTHER GOVERNMENT

## 2024-05-02 DIAGNOSIS — M17.12 PRIMARY OSTEOARTHRITIS OF LEFT KNEE: ICD-10-CM

## 2024-05-02 RX ORDER — OXYCODONE AND ACETAMINOPHEN 7.5; 325 MG/1; MG/1
1 TABLET ORAL EVERY 4 HOURS PRN
Qty: 42 TABLET | Refills: 0 | Status: SHIPPED | OUTPATIENT
Start: 2024-05-02

## 2024-05-02 NOTE — TELEPHONE ENCOUNTER
Patient states she has been taking 2 tablets every 4 hours. Informed the patient that a prescription was sent into the pharmacy on 04/30/24 for 1 tablet every 4 hours due to her insurance not covering 1-2 tablets every 4 hours. Patient will start taking only 1 tablet every 4 hours for pain control.

## 2024-05-06 ENCOUNTER — TREATMENT (OUTPATIENT)
Dept: PHYSICAL THERAPY | Facility: CLINIC | Age: 59
End: 2024-05-06
Payer: OTHER GOVERNMENT

## 2024-05-06 DIAGNOSIS — M25.562 ACUTE PAIN OF LEFT KNEE: ICD-10-CM

## 2024-05-06 DIAGNOSIS — Z96.652 AFTERCARE FOLLOWING LEFT KNEE JOINT REPLACEMENT SURGERY: Primary | ICD-10-CM

## 2024-05-06 DIAGNOSIS — Z47.1 AFTERCARE FOLLOWING LEFT KNEE JOINT REPLACEMENT SURGERY: Primary | ICD-10-CM

## 2024-05-06 DIAGNOSIS — M25.662 DECREASED RANGE OF MOTION (ROM) OF LEFT KNEE: ICD-10-CM

## 2024-05-06 PROCEDURE — 97530 THERAPEUTIC ACTIVITIES: CPT | Performed by: PHYSICAL THERAPIST

## 2024-05-06 PROCEDURE — 97140 MANUAL THERAPY 1/> REGIONS: CPT | Performed by: PHYSICAL THERAPIST

## 2024-05-06 PROCEDURE — 97110 THERAPEUTIC EXERCISES: CPT | Performed by: PHYSICAL THERAPIST

## 2024-05-07 ENCOUNTER — OFFICE VISIT (OUTPATIENT)
Dept: ORTHOPEDIC SURGERY | Facility: CLINIC | Age: 59
End: 2024-05-07
Payer: OTHER GOVERNMENT

## 2024-05-07 VITALS
WEIGHT: 227.07 LBS | HEIGHT: 62 IN | HEART RATE: 82 BPM | SYSTOLIC BLOOD PRESSURE: 121 MMHG | OXYGEN SATURATION: 94 % | DIASTOLIC BLOOD PRESSURE: 74 MMHG | BODY MASS INDEX: 41.79 KG/M2

## 2024-05-07 DIAGNOSIS — M25.562 LEFT KNEE PAIN, UNSPECIFIED CHRONICITY: Primary | ICD-10-CM

## 2024-05-07 DIAGNOSIS — Z47.89 AFTERCARE FOLLOWING SURGERY OF THE MUSCULOSKELETAL SYSTEM: ICD-10-CM

## 2024-05-07 PROCEDURE — 99024 POSTOP FOLLOW-UP VISIT: CPT | Performed by: PHYSICIAN ASSISTANT

## 2024-05-07 RX ORDER — HYDROCODONE BITARTRATE AND ACETAMINOPHEN 7.5; 325 MG/1; MG/1
1 TABLET ORAL EVERY 4 HOURS PRN
Qty: 42 TABLET | Refills: 0 | Status: SHIPPED | OUTPATIENT
Start: 2024-05-07

## 2024-05-09 ENCOUNTER — TREATMENT (OUTPATIENT)
Dept: PHYSICAL THERAPY | Facility: CLINIC | Age: 59
End: 2024-05-09
Payer: OTHER GOVERNMENT

## 2024-05-09 DIAGNOSIS — M25.562 ACUTE PAIN OF LEFT KNEE: ICD-10-CM

## 2024-05-09 DIAGNOSIS — Z47.1 AFTERCARE FOLLOWING LEFT KNEE JOINT REPLACEMENT SURGERY: Primary | ICD-10-CM

## 2024-05-09 DIAGNOSIS — Z96.652 AFTERCARE FOLLOWING LEFT KNEE JOINT REPLACEMENT SURGERY: Primary | ICD-10-CM

## 2024-05-09 DIAGNOSIS — M25.662 DECREASED RANGE OF MOTION (ROM) OF LEFT KNEE: ICD-10-CM

## 2024-05-13 ENCOUNTER — TREATMENT (OUTPATIENT)
Dept: PHYSICAL THERAPY | Facility: CLINIC | Age: 59
End: 2024-05-13
Payer: OTHER GOVERNMENT

## 2024-05-13 DIAGNOSIS — Z47.1 AFTERCARE FOLLOWING LEFT KNEE JOINT REPLACEMENT SURGERY: Primary | ICD-10-CM

## 2024-05-13 DIAGNOSIS — M25.662 DECREASED RANGE OF MOTION (ROM) OF LEFT KNEE: ICD-10-CM

## 2024-05-13 DIAGNOSIS — Z96.652 AFTERCARE FOLLOWING LEFT KNEE JOINT REPLACEMENT SURGERY: Primary | ICD-10-CM

## 2024-05-13 DIAGNOSIS — M25.562 ACUTE PAIN OF LEFT KNEE: ICD-10-CM

## 2024-05-13 PROCEDURE — 97110 THERAPEUTIC EXERCISES: CPT | Performed by: PHYSICAL THERAPIST

## 2024-05-13 PROCEDURE — 97140 MANUAL THERAPY 1/> REGIONS: CPT | Performed by: PHYSICAL THERAPIST

## 2024-05-13 PROCEDURE — 97530 THERAPEUTIC ACTIVITIES: CPT | Performed by: PHYSICAL THERAPIST

## 2024-05-13 NOTE — PROGRESS NOTES
Physical Therapy Daily Treatment Note  [address], [address], [place] [address]      Patient: Soheila rGiffin   : 1965  Diagnosis/ICD-10 Code:  Aftercare following left knee joint replacement surgery [Z47.1, Z96.652]  Referring practitioner: Hardeep Acevedo MD  Date of Initial Visit: Type: THERAPY  Noted: 2024  Today's Date: 2024  Patient seen for 6 sessions           Subjective   Soheila Griffin reports: 6/10 L knee pain at beginning of session today.      Objective   Poor L quad set  L knee flexion PROM: 105  See Exercise, Manual, and Modality Logs for complete treatment.       Assessment/Plan  Patient tolerated all exercise progressions and manual therapy well today but continues to demonstrate R strength/ROM deficits limiting function.  Exercise tolerance improved but pt requires several seated/standing rest breaks.  Continue to progress per patient tolerance.    Progress per Plan of Care           Timed:  Manual Therapy:    10     mins  48024;  Therapeutic Exercise:    40     mins  99994;     Neuromuscular Antonio:    0    mins  45923;    Therapeutic Activity:     10     mins  94548;     Gait Trainin     mins  50085;     Ultrasound:     0     mins  21901;    Electrical Stimulation:    0     mins  63206;  Iontophoresis     0     mins  02838    Untimed:  Electrical Stimulation:    0     mins  65690 ( );  Mechanical Traction:    0     mins  16105;   Fluidotherapy     0     mins  62219  Hot pack     0     Mins    Cold pack                       0     Mins     Timed Treatment:   60   mins   Total Treatment:     60   mins        Rachel Hercules PT    Electronically signed @ORCÍO@  [place] License: [id number]  NPI number: [id number]

## 2024-05-14 DIAGNOSIS — M25.562 LEFT KNEE PAIN, UNSPECIFIED CHRONICITY: ICD-10-CM

## 2024-05-14 RX ORDER — HYDROCODONE BITARTRATE AND ACETAMINOPHEN 7.5; 325 MG/1; MG/1
1 TABLET ORAL EVERY 4 HOURS PRN
Qty: 42 TABLET | Refills: 0 | Status: SHIPPED | OUTPATIENT
Start: 2024-05-14

## 2024-05-15 ENCOUNTER — TELEPHONE (OUTPATIENT)
Dept: ORTHOPEDIC SURGERY | Facility: CLINIC | Age: 59
End: 2024-05-15
Payer: OTHER GOVERNMENT

## 2024-05-15 DIAGNOSIS — Z47.89 AFTERCARE FOLLOWING SURGERY OF THE MUSCULOSKELETAL SYSTEM: Primary | ICD-10-CM

## 2024-05-15 RX ORDER — CEPHALEXIN 500 MG/1
500 CAPSULE ORAL EVERY 6 HOURS
Qty: 40 CAPSULE | Refills: 0 | Status: SHIPPED | OUTPATIENT
Start: 2024-05-15

## 2024-05-15 NOTE — TELEPHONE ENCOUNTER
PER DR CONDE KEFLEX WAS SENT INTO PHARMACY FOR PATIENT.  WOUND CARE DISCUSSED WITH PATIENT.  PATIENT VOICED UNDERSTANDING AND WILL CALL THE OFFICE WITH NEW ISSUES OR CONCERNS.

## 2024-05-15 NOTE — TELEPHONE ENCOUNTER
Caller: Soheila Griffin     Relationship: SELF    Best call back number: [phone]    What is your medical concern? PT WAS CHANGING HER STRIPS ON HER INCISION, ON THE TOP OF THE INCISION THERE WAS SOME PUSS AND REDNESS - PT CLEANED IT WITH ALCOHOL AND PLACED A GAUZE AND ACE BANDAGE ON IT - HAS A LITTLE BIT OF WARMNESS - STATES IT WAS WATERY AND OOZING AND IS WORRIED FOR INFECTION    How long has this issue been going on? 1 DAY     Is your provider already aware of this issue? NO    Have you been treated for this issue? NO

## 2024-05-15 NOTE — TELEPHONE ENCOUNTER
CALLED AND TALKED WITH PATIENT.  PATIENT STATES AFTER SHE REMOVED THE STERI-STRIPS AND CLEANED WITH ALCOHOL THE DRAINAGE WAS NOT PRESENT.  NO FEVER.  NO INCREASE IN PAIN.  MESSAGE SENT TO SEE IF ANTIBIOTICS CAN BE SENT IN.  I WILL CALL PATIENT AGAIN ONCE I HAVE AN ANSWER.

## 2024-05-16 ENCOUNTER — CLINICAL SUPPORT (OUTPATIENT)
Dept: INTERNAL MEDICINE | Facility: CLINIC | Age: 59
End: 2024-05-16
Payer: OTHER GOVERNMENT

## 2024-05-16 ENCOUNTER — TREATMENT (OUTPATIENT)
Dept: PHYSICAL THERAPY | Facility: CLINIC | Age: 59
End: 2024-05-16
Payer: OTHER GOVERNMENT

## 2024-05-16 ENCOUNTER — TELEPHONE (OUTPATIENT)
Dept: ORTHOPEDIC SURGERY | Facility: CLINIC | Age: 59
End: 2024-05-16
Payer: OTHER GOVERNMENT

## 2024-05-16 VITALS — DIASTOLIC BLOOD PRESSURE: 76 MMHG | SYSTOLIC BLOOD PRESSURE: 124 MMHG

## 2024-05-16 DIAGNOSIS — M25.662 DECREASED RANGE OF MOTION (ROM) OF LEFT KNEE: ICD-10-CM

## 2024-05-16 DIAGNOSIS — Z96.652 AFTERCARE FOLLOWING LEFT KNEE JOINT REPLACEMENT SURGERY: Primary | ICD-10-CM

## 2024-05-16 DIAGNOSIS — M25.562 ACUTE PAIN OF LEFT KNEE: ICD-10-CM

## 2024-05-16 DIAGNOSIS — Z47.1 AFTERCARE FOLLOWING LEFT KNEE JOINT REPLACEMENT SURGERY: Primary | ICD-10-CM

## 2024-05-16 NOTE — PROGRESS NOTES
Physical Therapy Daily Treatment Note  [address] [facility], [address], [place] [address]      Patient: Soheila Griffin   : 1965  Diagnosis/ICD-10 Code:  Aftercare following left knee joint replacement surgery [Z47.1, Z96.652]  Referring practitioner: Hardeep Acevedo MD  Date of Initial Visit: Type: THERAPY  Noted: 2024  Today's Date: 2024  Patient seen for 7 sessions           Subjective   Soheila Griffin reports: mild L knee pain/stiffness at beginning of session today.  Pt reports that she was dx with L knee infection and started medication for this today.  Pt reports that she has been dizzy today and her BP has been elevated.  Pt reports that she called MD about this.    Objective   L knee flexion AROM: 88  BP at end of session: 149/80  See Exercise, Manual, and Modality Logs for complete treatment.       Assessment/Plan  Patient tolerated all exercise progressions and manual therapy well today but continues to demonstrate L knee strength/ROM deficits limiting function. BP was elevated this session and pt complains of dizziness.  Ortho nurse Kacey communicated for pt to call her and her PCP regarding these issues.  Pt relayed this information to the pt.  Continue to progress per patient tolerance.    Progress per Plan of Care           Timed:  Manual Therapy:    10     mins  08859;  Therapeutic Exercise:    33     mins  24677;     Neuromuscular Antonio:    0    mins  64976;    Therapeutic Activity:     10     mins  15635;     Gait Trainin     mins  01713;     Ultrasound:     0     mins  73505;    Electrical Stimulation:    0     mins  04033;  Iontophoresis     0     mins  61746    Untimed:  Electrical Stimulation:    0     mins  88022 ( );  Mechanical Traction:    0     mins  54758;   Fluidotherapy     0     mins  22140  Hot pack     0     Mins    Cold pack                       0     Mins     Timed Treatment:   53   mins   Total Treatment:     60   mins        Rachel Hercules  PT    Electronically signed @ROCÍO@  [place] License: [id number]  NPI number: [id number]

## 2024-05-16 NOTE — PROGRESS NOTES
Blood Pressure Check     Soheila Griffin, 1965, presents to the clinic for a blood pressure check    Reason for walk in check: Symptomatic - patient had appointment with  PT staff this morning.  Reports BP was 149/80 and requested to have BP checked.  Patient is s/p Left TKR with post-op infection.  Started ABX last night.  Patient confirmed with SILVANA LOREDO that she does have BP machine at home.  Pharmacy is [facility].       Current Outpatient Medications:     apixaban (ELIQUIS) 2.5 MG tablet tablet, Take 1 tablet by mouth 2 (Two) Times a Day., Disp: 28 tablet, Rfl: 0    aspirin (Ecotrin) 325 MG EC tablet, Take 1 tablet by mouth Daily. Start after Eliquis is completed. (Patient not taking: Reported on 5/1/2024), Disp: 14 tablet, Rfl: 0    cephalexin (KEFLEX) 500 MG capsule, Take 1 capsule by mouth Every 6 (Six) Hours., Disp: 40 capsule, Rfl: 0    cetirizine (zyrTEC) 10 MG tablet, Take 1 tablet by mouth Daily., Disp: 90 tablet, Rfl: 1    Cholecalciferol 25 MCG (1000 UT) tablet, Take 1 tablet by mouth Daily. (Patient not taking: Reported on 5/1/2024), Disp: , Rfl:     citalopram (CeleXA) 20 MG tablet, Take 1 tablet by mouth Every Night., Disp: 90 tablet, Rfl: 1    HYDROcodone-acetaminophen (Norco) 7.5-325 MG per tablet, Take 1 tablet by mouth Every 4 (Four) Hours As Needed for Moderate Pain., Disp: 42 tablet, Rfl: 0    levothyroxine (SYNTHROID, LEVOTHROID) 25 MCG tablet, Take 1 tablet by mouth Every Morning., Disp: 90 tablet, Rfl: 1    multivitamin with minerals tablet tablet, Take 1 tablet by mouth Daily. (Patient not taking: Reported on 5/1/2024), Disp: , Rfl:     naloxone (NARCAN) 4 MG/0.1ML nasal spray, Call 911. Don't prime. [place] in 1 nostril for overdose. Repeat in 2-3 minutes in other nostril if no or minimal breathing/responsiveness., Disp: 2 each, Rfl: 0      Blood Pressure Reading Today: 124/76; Left arm; sitting; manual    Symptoms: Dizziness    Previous Readings:   BP Readings from Last  3 Encounters:   05/16/24 124/76   05/07/24 121/74   05/01/24 128/62       Provider Consulted: None    Follow-Up Plan: RTC if BP concerns continue.  BP WNL at this time.      SILVANA LOREDO completed visit for patient and checked BP.      Mana Alba RN  05/16/24, 11:07 EDT

## 2024-05-16 NOTE — TELEPHONE ENCOUNTER
PATIENT WAS CALLED AND ADVISED THAT SHE NEEDED TO SEE  HER PCP ABOUT THE ELEVATED B/P AND THE DIZZINESS SHE IS HAVING.  PATIENT VOICED  UNDERSTANDING AND IS AT HER PCP OFFICE AFTER BEING AT PHYSICAL THERAPY.   DR CONDE IS AWARE OF ISSUES AND ENCOURAGED HER TO SEE PCP

## 2024-05-23 DIAGNOSIS — Z47.89 AFTERCARE FOLLOWING SURGERY OF THE MUSCULOSKELETAL SYSTEM: Primary | ICD-10-CM

## 2024-05-23 RX ORDER — TRAMADOL HYDROCHLORIDE 50 MG/1
50 TABLET ORAL EVERY 4 HOURS PRN
Qty: 42 TABLET | Refills: 0 | Status: SHIPPED | OUTPATIENT
Start: 2024-05-23

## 2024-05-29 ENCOUNTER — TREATMENT (OUTPATIENT)
Dept: PHYSICAL THERAPY | Facility: CLINIC | Age: 59
End: 2024-05-29
Payer: OTHER GOVERNMENT

## 2024-05-29 DIAGNOSIS — Z47.1 AFTERCARE FOLLOWING LEFT KNEE JOINT REPLACEMENT SURGERY: Primary | ICD-10-CM

## 2024-05-29 DIAGNOSIS — M25.662 DECREASED RANGE OF MOTION (ROM) OF LEFT KNEE: ICD-10-CM

## 2024-05-29 DIAGNOSIS — M25.562 ACUTE PAIN OF LEFT KNEE: ICD-10-CM

## 2024-05-29 DIAGNOSIS — Z96.652 AFTERCARE FOLLOWING LEFT KNEE JOINT REPLACEMENT SURGERY: Primary | ICD-10-CM

## 2024-05-29 NOTE — PROGRESS NOTES
Progress note  [address] [facility], [address] [place] [address]      Patient: Soheila Griffin   : 1965  Diagnosis/ICD-10 Code:  Aftercare following left knee joint replacement surgery [Z47.1, Z96.652]  Referring practitioner: Hardeep Acevedo MD  Date of Initial Visit: Type: THERAPY  Noted: 2024  Today's Date: 2024  Patient seen for 8 sessions        Subjective:   Soheila Griffin reports: mild R knee pain/stiffness.  Pt reports that pain/function has improved over the past month.  Pt states that she continues to have difficulty with prolonged walking, standing, stairs, sleeping and other ADLs due to pain, stiffness and weakness.  Pt is still using cane for ambulation.    Subjective Questionnaire: LEFS: 35/80      Subjective   Objective   Active Range of Motion   Left Knee   Flexion: 90 degrees with pain  Extension: 10 (lacking 0) degrees with pain     Passive Range of Motion   Left Knee   Flexion: 105 degrees with pain  Extension: 5 degrees with pain     Strength/Myotome Testing      Left Hip   Planes of Motion   Flexion: 4-     Right Hip   Planes of Motion   Flexion: 4+     Left Knee   Flexion: 4  Extension: 4-  Quadriceps contraction: poor     Right Knee   Flexion: 5  Extension: 5     Assessment & Plan       Assessment  Impairments: abnormal gait, abnormal muscle tone, abnormal or restricted ROM, activity intolerance, impaired balance, impaired physical strength, lacks appropriate home exercise program and pain with function   Functional limitations: lifting, sleeping, walking, uncomfortable because of pain, sitting, standing and unable to perform repetitive tasks   Assessment details: Patient demonstrates improvements in L knee strength/ROM and reports of pain during ADLs compared to initial evaluation.  Pt however demonstrates decreased L knee ROM, bilateral hip and L knee weakness, decreased quad activation, gait abnormalities and reports of pain limiting function during ADLs as shown on the  LEFS.  Pt is most limited in L knee extension strength and L knee flexion AROM/PROM.  Patient would continue to benefit from skilled PT services in order to address deficits limiting function at this time.           Goals  Plan Goals: 1. The patient has limited ROM of the L knee.   LTG 1: 12 weeks:  The patient will demonstrate 0 to 120 degrees of ROM for the L knee in order to allow patient to complete prolonged walking, standing, stairs and other ADLs with decreased pain/difficulty.    STATUS:  Ongoing   STG 1a:  6 weeks:  The patient will demonstrate 5 to 105 degrees of ROM for the L knee.    STATUS:  ongoing    2. The patient has limited strength of the left knee.   LTG 2: 12 weeks: The patient will demonstrate 4+/5 strength for L knee flexion and extension in order to allow patient improved joint stability    STATUS:  ongoing   STG 2a: 6 weeks: The patient will demonstrate 4/5 strength for L knee flexion and extension    STATUS:  partially met, continue     3. The patient has gait dysfunction.   LTG 3: 12 weeks:  The patient will ambulate without assistive device, independently, for community distances with minimal limp to the L lower extremity in order to improve mobility and allow patient to perform activities such as grocery shopping with greater ease.    STATUS:  ongoing    4. Mobility: Walking/Moving Around Functional Limitation     LTG 4: 12 weeks:  The patient will demonstrate 19 % limitation by achieving a score of 65 on the LEFS.    STATUS:  ongoing   STG 4 a: 6 weeks:  The patient will demonstrate 50 % limitation by achieving a score of 40 on the LEFS.      STATUS:  ongoing    Plan  Therapy options: will be seen for skilled therapy services  Planned modality interventions: TENS, electrical stimulation/Russian stimulation, thermotherapy (hydrocollator packs) and cryotherapy  Planned therapy interventions: manual therapy, ADL retraining, balance/weight-bearing training, neuromuscular re-education, body  mechanics training, postural training, soft tissue mobilization, flexibility, spinal/joint mobilization, functional ROM exercises, strengthening, gait training, stretching, home exercise program, therapeutic activities, transfer training and joint mobilization  Frequency: 3x week (2-3)  Duration in weeks: 8  Treatment plan discussed with: patient      Progress toward previous goals: Partially Met      Recommendations: Continue as planned  Timeframe: 2 months  Prognosis to achieve goals: good    PT SIGNATURE: Rachel Hercules, PT     Electronically signed 2024  DATE TREATMENT INITIATED: 2024  [place] License: [id number]      Based upon review of the patient's progress and continued therapy plan, it is my medical opinion that Soheila Griffin should continue physical therapy treatment at [facility] PHYSICAL THERAPY  [address] [place] [id number] [phone].      Timed:  Manual Therapy:    10     mins  92518;  Therapeutic Exercise:    35     mins  74358;     Neuromuscular Antonio:    0    mins  90376;    Therapeutic Activity:     8     mins  40481;     Gait Trainin     mins  64179;     Ultrasound:     0     mins  54752;    Electrical Stimulation:    0     mins  91697 ( );    Untimed:  Electrical Stimulation:    0     mins  35000 ( );  Mechanical Traction:    0     mins  46307;     Timed Treatment:   53   mins   Total Treatment:     58   mins

## 2024-05-31 ENCOUNTER — TREATMENT (OUTPATIENT)
Dept: PHYSICAL THERAPY | Facility: CLINIC | Age: 59
End: 2024-05-31
Payer: OTHER GOVERNMENT

## 2024-05-31 DIAGNOSIS — M25.562 ACUTE PAIN OF LEFT KNEE: ICD-10-CM

## 2024-05-31 DIAGNOSIS — M25.662 DECREASED RANGE OF MOTION (ROM) OF LEFT KNEE: ICD-10-CM

## 2024-05-31 DIAGNOSIS — Z47.1 AFTERCARE FOLLOWING LEFT KNEE JOINT REPLACEMENT SURGERY: Primary | ICD-10-CM

## 2024-05-31 DIAGNOSIS — Z96.652 AFTERCARE FOLLOWING LEFT KNEE JOINT REPLACEMENT SURGERY: Primary | ICD-10-CM

## 2024-05-31 NOTE — PROGRESS NOTES
Physical Therapy Daily Treatment Note  [address] [facility], [address] [place] [address]        Patient: Soheila Griffin   : 1965  Diagnosis/ICD-10 Code:  Aftercare following left knee joint replacement surgery [Z47.1, Z96.652]  Referring practitioner: Hardeep Acevedo MD  Date of Initial Visit: Type: THERAPY  Noted: 2024  Today's Date: 2024  Patient seen for 9 sessions             Subjective   Soheila Griffin reports having 3/10 pain in her Left lateral knee upon arrival today.  She states that she has been taking Ibuprofen with her pain medicine and states that seems to have helped with her pain.    Objective        Passive Range of Motion   Left Knee   Flexion: 105 degrees with pain  Extension: 5 degrees with pain     See Exercise and Manual Logs for complete treatment.       Assessment/Plan    Pt tolerated therapy session well - with progression of therapeutic exercises, CKC-Functional activities, and Manual therapy.  She has improved, but continues to have deficits in Her Left Hip/Knee ROM,  Strength, and Stability; limiting functional mobility and ability to perform ADLs without increased pain and difficulty at this time.          Progress per Plan of Care - as tolerated.               Timed:  Manual Therapy:    10     mins  59029;  Therapeutic Exercise:    33     mins  01430;     Neuromuscular Antonio:    0    mins  16814;    Therapeutic Activity:     10     mins  91975;     Gait Trainin     mins  52707;     Ultrasound:     0     mins  44368;    Electrical Stimulation:    0     mins  86967;  Iontophoresis     0     mins  25254    Untimed:  Electrical Stimulation:    0     mins  00344 ( );  Mechanical Traction:    0     mins  62492;   Fluidotherapy     0     mins  57720  Hot pack     0     mins  48366  Cold pack     0     mins  76315    Timed Treatment:   53   mins   Total Treatment:     53   mins        Lily Kovacs PTA  Physical Therapist Assistant

## 2024-06-03 ENCOUNTER — OFFICE VISIT (OUTPATIENT)
Dept: ORTHOPEDIC SURGERY | Facility: CLINIC | Age: 59
End: 2024-06-03
Payer: OTHER GOVERNMENT

## 2024-06-03 VITALS
DIASTOLIC BLOOD PRESSURE: 78 MMHG | HEIGHT: 62 IN | BODY MASS INDEX: 41.77 KG/M2 | WEIGHT: 227 LBS | OXYGEN SATURATION: 96 % | SYSTOLIC BLOOD PRESSURE: 129 MMHG | HEART RATE: 85 BPM

## 2024-06-03 DIAGNOSIS — Z47.89 AFTERCARE FOLLOWING SURGERY OF THE MUSCULOSKELETAL SYSTEM: Primary | ICD-10-CM

## 2024-06-03 PROCEDURE — 99024 POSTOP FOLLOW-UP VISIT: CPT | Performed by: PHYSICIAN ASSISTANT

## 2024-06-03 NOTE — PROGRESS NOTES
Chief Complaint  Follow-up of the Left Knee    Subjective          History of Present Illness      Soheila Griffin is a 59 y.o. female  presents to [facility] for     Patient presents with her  Lei for follow-up evaluation of left total knee arthroplasty 4/24/2024.  She was seen for her 2-week postop visit we discussed being seen 2 weeks later to evaluate her extension ability patient had to miss that appointment due to travel she is here today for evaluation.  She has been attending therapy with good results she states therapy has helped her to about 110 degrees of flexion she states that her strengthening is going well she is using a cane.  She is already wanting to stop the pain medicine she would like to start driving.  She denies calf pain.      Allergies   Allergen Reactions    Amoxicillin Rash     CHILDHOOD RX    Diclofenac Nausea Only and Dizziness    Metal [Nickel] Itching, Swelling and Rash     CHEAP JEWELRY     Oxycodone-Acetaminophen Palpitations    Sulfamethoxazole-Trimethoprim Photosensitivity        Social History     Socioeconomic History    Marital status:    Tobacco Use    Smoking status: Former     Current packs/day: 0.25     Average packs/day: 0.3 packs/day for 3.0 years (0.8 ttl pk-yrs)     Types: Cigarettes    Smokeless tobacco: Never   Vaping Use    Vaping status: Never Used   Substance and Sexual Activity    Alcohol use: Not Currently    Drug use: Never    Sexual activity: Yes     Partners: Male     Birth control/protection: Vasectomy        REVIEW OF SYSTEMS    Constitutional: Awake alert and oriented x3, no acute distress, denies fevers, chills, weight loss  Respiratory: No respiratory distress  Vascular: Brisk cap refill, Intact distal pulses, No cyanosis, compartments soft with no signs or symptoms of compartment syndrome or DVT.   Cardiovascular: Denies chest pain, shortness of breath  Skin: Denies rashes, acute skin changes  Neurologic:  "Denies headache, loss of consciousness  MSK: Left knee pain      Objective   Vital Signs:   /78   Pulse 85   Ht 157.5 cm (62\")   Wt 103 kg (227 lb)   SpO2 96%   BMI 41.52 kg/m²     Body mass index is 41.52 kg/m².    Physical Exam       Left knee: Incision is well-healed, no erythema, no drainage or dehiscence, no signs of infection, extension -5 flexion 110, stable to varus/valgus stress stable anterior/posterior drawer.  Tender calf, negative Tyler testing      Procedures    Imaging Results (Most Recent)       None             Result Review :   The following data was reviewed by: RODO Dubon on 06/03/2024:               Assessment and Plan    Diagnoses and all orders for this visit:    1. Aftercare following surgery of left total knee arthroplasty, 4/24/2024 (Primary)        Discussed diagnosis and treatment options with the patient she was advised to continue therapy for strength and range of motion she has visits planned for the next few weeks she was also advised to start taking anti-inflammation medication or Tylenol for pain she agreed, follow-up in 6 weeks for recheck with x-rays    Call or return if worsening symptoms.    Follow Up   Return in about 6 weeks (around 7/15/2024) for Recheck.  Patient was given instructions and counseling regarding her condition or for health maintenance advice. Please see specific information pulled into the AVS if appropriate.       EMR Dragon/Transcription disclaimer:  Part of this note may be an electronic transcription/translation of spoken language to printed text using the Dragon Dictation System  "

## 2024-06-04 ENCOUNTER — TREATMENT (OUTPATIENT)
Dept: PHYSICAL THERAPY | Facility: CLINIC | Age: 59
End: 2024-06-04
Payer: OTHER GOVERNMENT

## 2024-06-04 DIAGNOSIS — M25.562 ACUTE PAIN OF LEFT KNEE: ICD-10-CM

## 2024-06-04 DIAGNOSIS — M25.662 DECREASED RANGE OF MOTION (ROM) OF LEFT KNEE: ICD-10-CM

## 2024-06-04 DIAGNOSIS — Z47.1 AFTERCARE FOLLOWING LEFT KNEE JOINT REPLACEMENT SURGERY: Primary | ICD-10-CM

## 2024-06-04 DIAGNOSIS — Z96.652 AFTERCARE FOLLOWING LEFT KNEE JOINT REPLACEMENT SURGERY: Primary | ICD-10-CM

## 2024-06-04 PROCEDURE — 97110 THERAPEUTIC EXERCISES: CPT | Performed by: PHYSICAL THERAPIST

## 2024-06-04 PROCEDURE — 97530 THERAPEUTIC ACTIVITIES: CPT | Performed by: PHYSICAL THERAPIST

## 2024-06-04 PROCEDURE — 97140 MANUAL THERAPY 1/> REGIONS: CPT | Performed by: PHYSICAL THERAPIST

## 2024-06-04 NOTE — PROGRESS NOTES
"Physical Therapy Daily Treatment Note  [address], [address] [place] [address]        Patient: Soheila Griffin   : 1965  Diagnosis/ICD-10 Code:  Aftercare following left knee joint replacement surgery [Z47.1, Z96.652]  Referring practitioner: Hardeep Acevedo MD  Date of Initial Visit: Type: THERAPY  Noted: 2024  Today's Date: 2024  Patient seen for 10 sessions             Subjective   Soheila Griffin reports not feeling too good today.  She states that she has been \"Nauseous\" and \"Dizzy\" upon arrival today.  She states, \"I think it is where I'm trying to come off of my pain medication\".  She reports having \"Restless Leg\" issues that kept her from sleeping last night.  She reports her right left knee pain at 2/10 upon arrival.  She reports that her follow-up with Orthopedic went well yesterday.    Objective       See Exercise and Manual Logs for complete treatment.       Assessment/Plan    Pt tolerated therapy session well - with progression of therapeutic exercises, CKC-Functional activities, and Manual therapy.  She has improved, but continues to have deficits in Her Left Hip/Knee ROM,  Strength, and Stability; limiting functional mobility and ability to perform ADLs without increased pain and difficulty at this time.  Pt reported feeling better post session as compared to pre session today.             Progress per Plan of Care - as tolerated.               Timed:  Manual Therapy:    10     mins  02089;  Therapeutic Exercise:    20     mins  68656;     Neuromuscular Antonio:    0    mins  94065;    Therapeutic Activity:     8     mins  13426;     Gait Trainin     mins  17735;     Ultrasound:     0     mins  35950;    Electrical Stimulation:    0     mins  79959;  Iontophoresis     0     mins  96102    Untimed:  Electrical Stimulation:    00     mins  52910 ( );  Mechanical Traction:    0     mins  75909;   Fluidotherapy     0     mins  63510  Hot pack     0     mins  " 68994  Cold pack     0     mins  64649    Timed Treatment:   38   mins   Total Treatment:     38   mins        Lily Kovacs PTA  Physical Therapist Assistant

## 2024-06-06 ENCOUNTER — PATIENT MESSAGE (OUTPATIENT)
Dept: INTERNAL MEDICINE | Facility: CLINIC | Age: 59
End: 2024-06-06
Payer: OTHER GOVERNMENT

## 2024-06-06 DIAGNOSIS — Z47.89 AFTERCARE FOLLOWING SURGERY OF THE MUSCULOSKELETAL SYSTEM: Primary | ICD-10-CM

## 2024-06-06 NOTE — TELEPHONE ENCOUNTER
From: Soheila Griffin  To: Brittnee Kovacs  Sent: 6/6/2024 11:31 AM EDT  Subject: Referral renewal      Hello, my referral from where I had my knee replacement is expiring and Dr Kim office needs a new one if you could send one to them  Please. Thank you

## 2024-06-07 ENCOUNTER — TREATMENT (OUTPATIENT)
Dept: PHYSICAL THERAPY | Facility: CLINIC | Age: 59
End: 2024-06-07
Payer: OTHER GOVERNMENT

## 2024-06-07 DIAGNOSIS — M25.662 DECREASED RANGE OF MOTION (ROM) OF LEFT KNEE: ICD-10-CM

## 2024-06-07 DIAGNOSIS — M25.562 ACUTE PAIN OF LEFT KNEE: ICD-10-CM

## 2024-06-07 DIAGNOSIS — Z47.1 AFTERCARE FOLLOWING LEFT KNEE JOINT REPLACEMENT SURGERY: Primary | ICD-10-CM

## 2024-06-07 DIAGNOSIS — Z96.652 AFTERCARE FOLLOWING LEFT KNEE JOINT REPLACEMENT SURGERY: Primary | ICD-10-CM

## 2024-06-07 NOTE — PROGRESS NOTES
Physical Therapy Daily Treatment Note  [address], [address], [place] [address]      Patient: Soheila Griffin   : 1965  Diagnosis/ICD-10 Code:  Aftercare following left knee joint replacement surgery [Z47.1, Z96.652]  Referring practitioner: Hardeep Acevedo MD  Date of Initial Visit: Type: THERAPY  Noted: 2024  Today's Date: 2024  Patient seen for 11 sessions           Subjective   Soheila Griffin reports: mild stiffness at beginning of session today.  Pt states that she has felt decreased pain/stiffness the past several days.      Objective   See Exercise, Manual, and Modality Logs for complete treatment.       Assessment/Plan  Patient tolerated all exercise progressions and manual therapy well today but continues to demonstrate L knee strength/ROM deficits limiting function. Decreased PROM achieved this AM due to knee joint stiffness and muscle guarding.  Continue to progress per patient tolerance.    Progress per Plan of Care           Timed:  Manual Therapy:    10     mins  37998;  Therapeutic Exercise:    20     mins  91747;     Neuromuscular Antonio:    0    mins  69790;    Therapeutic Activity:     8     mins  14316;     Gait Trainin     mins  16102;     Ultrasound:     0     mins  86976;    Electrical Stimulation:    0     mins  05294;  Iontophoresis     0     mins  60299    Untimed:  Electrical Stimulation:    0     mins  10060 ( );  Mechanical Traction:    0     mins  16233;   Fluidotherapy     0     mins  73750  Hot pack     0     Mins    Cold pack                       0     Mins     Timed Treatment:   38   mins   Total Treatment:     38   mins        Rachel Hercules PT    Electronically signed [unfilled]  [place] License: [id number]  NPI number: [id number]

## 2024-06-10 ENCOUNTER — TELEMEDICINE (OUTPATIENT)
Dept: FAMILY MEDICINE CLINIC | Facility: TELEHEALTH | Age: 59
End: 2024-06-10
Payer: OTHER GOVERNMENT

## 2024-06-10 ENCOUNTER — TREATMENT (OUTPATIENT)
Dept: PHYSICAL THERAPY | Facility: CLINIC | Age: 59
End: 2024-06-10
Payer: OTHER GOVERNMENT

## 2024-06-10 DIAGNOSIS — Z47.1 AFTERCARE FOLLOWING LEFT KNEE JOINT REPLACEMENT SURGERY: Primary | ICD-10-CM

## 2024-06-10 DIAGNOSIS — Z96.652 AFTERCARE FOLLOWING LEFT KNEE JOINT REPLACEMENT SURGERY: Primary | ICD-10-CM

## 2024-06-10 DIAGNOSIS — M25.562 ACUTE PAIN OF LEFT KNEE: ICD-10-CM

## 2024-06-10 DIAGNOSIS — M25.662 DECREASED RANGE OF MOTION (ROM) OF LEFT KNEE: ICD-10-CM

## 2024-06-10 DIAGNOSIS — J01.20 ACUTE ETHMOIDAL SINUSITIS, RECURRENCE NOT SPECIFIED: Primary | ICD-10-CM

## 2024-06-10 PROCEDURE — 97140 MANUAL THERAPY 1/> REGIONS: CPT | Performed by: PHYSICAL THERAPIST

## 2024-06-10 PROCEDURE — 97110 THERAPEUTIC EXERCISES: CPT | Performed by: PHYSICAL THERAPIST

## 2024-06-10 PROCEDURE — 97530 THERAPEUTIC ACTIVITIES: CPT | Performed by: PHYSICAL THERAPIST

## 2024-06-10 PROCEDURE — 99213 OFFICE O/P EST LOW 20 MIN: CPT | Performed by: NURSE PRACTITIONER

## 2024-06-10 RX ORDER — IBUPROFEN 800 MG/1
800 TABLET ORAL EVERY 6 HOURS PRN
Qty: 60 TABLET | Refills: 0 | Status: SHIPPED | OUTPATIENT
Start: 2024-06-10

## 2024-06-10 RX ORDER — CEFDINIR 300 MG/1
300 CAPSULE ORAL 2 TIMES DAILY
Qty: 14 CAPSULE | Refills: 0 | Status: SHIPPED | OUTPATIENT
Start: 2024-06-10 | End: 2024-06-17
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
## 2024-06-10 NOTE — PROGRESS NOTES
Physical Therapy Daily Treatment Note  [address], [address] [place] [address]        Patient: Soheila Griffin   : 1965  Diagnosis/ICD-10 Code:  Aftercare following left knee joint replacement surgery [Z47.1, Z96.652]  Referring practitioner: Hardeep Acevedo MD  Date of Initial Visit: Type: THERAPY  Noted: 2024  Today's Date: 6/10/2024  Patient seen for 12 sessions             Subjective     Soheila Griffin reports that her knee has been feeling better overall.  She rates her left knee pain at 2/10 upon arrival.  She reports that she has continued not feeling great overall and state  she plans to see acute care later today for possible sinus issues and headache.      Objective     Active Range of Motion   Left Knee   Flexion: 100 degrees with pain  Extension: 8 (lacking 0) degrees with pain     Passive Range of Motion   Left Knee   Flexion: 105 degrees with pain  Extension: 5 degrees with pain    See Exercise and Manual Logs for complete treatment.       Assessment/Plan    Pt tolerated therapy session well - with progression of therapeutic exercises, CKC-Functional activities, and Manual therapy.  She has improved, but continues to have deficits in Her Left Hip/Knee ROM,  Strength, and Stability; limiting functional mobility and ability to perform ADLs without increased pain and difficulty at this time.  Pt exhibits improved left knee functional ROM and Strength.        Progress per Plan of Care - as tolerated.                  Timed:  Manual Therapy:    10     mins  24675;  Therapeutic Exercise:    33     mins  95621;     Neuromuscular Antonio:    0    mins  95703;    Therapeutic Activity:     10     mins  73310;     Gait Trainin     mins  78072;     Ultrasound:     0     mins  26584;    Electrical Stimulation:    0     mins  79197;  Iontophoresis     0     mins  70886    Untimed:  Electrical Stimulation:    0     mins  03182 ( );  Mechanical Traction:    0     mins  63218;    Fluidotherapy     0     mins  02142  Hot pack     0     mins  09621  Cold pack     0     mins  67918    Timed Treatment:   53   mins   Total Treatment:     53   mins        Lliy Kovacs PTA  Physical Therapist Assistant

## 2024-06-11 ENCOUNTER — PATIENT ROUNDING (BHMG ONLY) (OUTPATIENT)
Dept: URGENT CARE | Facility: CLINIC | Age: 59
End: 2024-06-11
Payer: OTHER GOVERNMENT

## 2024-06-11 NOTE — ED NOTES
Thank you for letting us care for you in your recent visit to our urgent care center. We would love to hear about your experience with us. Was this the first time you have visited our location?    We’re always looking for ways to make our patients’ experiences even better. Do you have any recommendations on ways we may improve?     I appreciate you taking the time to respond. Please be on the lookout for a survey about your recent visit from [email or web address] via text or email. We would greatly appreciate if you could fill that out and turn it back in. We want your voice to be heard and we value your feedback.   Thank you for choosing [facility] for your healthcare needs.

## 2024-06-13 ENCOUNTER — TREATMENT (OUTPATIENT)
Dept: PHYSICAL THERAPY | Facility: CLINIC | Age: 59
End: 2024-06-13
Payer: OTHER GOVERNMENT

## 2024-06-13 DIAGNOSIS — Z96.652 AFTERCARE FOLLOWING LEFT KNEE JOINT REPLACEMENT SURGERY: Primary | ICD-10-CM

## 2024-06-13 DIAGNOSIS — M25.662 DECREASED RANGE OF MOTION (ROM) OF LEFT KNEE: ICD-10-CM

## 2024-06-13 DIAGNOSIS — M25.562 ACUTE PAIN OF LEFT KNEE: ICD-10-CM

## 2024-06-13 DIAGNOSIS — Z47.1 AFTERCARE FOLLOWING LEFT KNEE JOINT REPLACEMENT SURGERY: Primary | ICD-10-CM

## 2024-06-13 NOTE — PROGRESS NOTES
Physical Therapy Daily Treatment Note  [address] [facility] [facility], [address], [place] [address]      Patient: Soheila Griffin   : 1965  Diagnosis/ICD-10 Code:  Aftercare following left knee joint replacement surgery [Z47.1, Z96.652]  Referring practitioner: Hardeep Acevedo MD  Date of Initial Visit: Type: THERAPY  Noted: 2024  Today's Date: 2024  Patient seen for 13 sessions             Subjective   Soheila Griffin reports: mild L knee stiffness at beginning of session today.    Objective   L knee flexion AROM: 98 degrees  See Exercise, Manual, and Modality Logs for complete treatment.       Assessment/Plan  Patient tolerated all exercise progressions and manual therapy well today but continues to demonstrate L knee strength/ROM deficits limiting function. Quad activation is improved but still impaired.  Continue to progress per patient tolerance.    Progress per Plan of Care           Timed:  Manual Therapy:    10     mins  19059;  Therapeutic Exercise:    35     mins  03722;     Neuromuscular Antonio:    0    mins  21160;    Therapeutic Activity:     8     mins  77534;     Gait Trainin     mins  54505;     Ultrasound:     0     mins  17476;    Electrical Stimulation:    0     mins  16330;  Iontophoresis     0     mins  36615    Untimed:  Electrical Stimulation:    0     mins  59929 ( );  Mechanical Traction:    0     mins  19772;   Fluidotherapy     0     mins  52978  Hot pack     0     Mins    Cold pack                       0     Mins     Timed Treatment:   53   mins   Total Treatment:     53   mins        Rachel Hercules PT    Electronically signed [unfilled]  [place] License: [id number]  NPI number: [id number]

## 2024-06-24 ENCOUNTER — TREATMENT (OUTPATIENT)
Dept: PHYSICAL THERAPY | Facility: CLINIC | Age: 59
End: 2024-06-24
Payer: OTHER GOVERNMENT

## 2024-06-24 ENCOUNTER — TELEPHONE (OUTPATIENT)
Dept: INTERNAL MEDICINE | Facility: CLINIC | Age: 59
End: 2024-06-24
Payer: OTHER GOVERNMENT

## 2024-06-24 DIAGNOSIS — Z96.652 AFTERCARE FOLLOWING LEFT KNEE JOINT REPLACEMENT SURGERY: Primary | ICD-10-CM

## 2024-06-24 DIAGNOSIS — Z47.1 AFTERCARE FOLLOWING LEFT KNEE JOINT REPLACEMENT SURGERY: Primary | ICD-10-CM

## 2024-06-24 DIAGNOSIS — M25.562 ACUTE PAIN OF LEFT KNEE: ICD-10-CM

## 2024-06-24 DIAGNOSIS — M25.662 DECREASED RANGE OF MOTION (ROM) OF LEFT KNEE: ICD-10-CM

## 2024-06-24 PROCEDURE — 97530 THERAPEUTIC ACTIVITIES: CPT | Performed by: PHYSICAL THERAPIST

## 2024-06-24 PROCEDURE — 97110 THERAPEUTIC EXERCISES: CPT | Performed by: PHYSICAL THERAPIST

## 2024-06-24 PROCEDURE — 97140 MANUAL THERAPY 1/> REGIONS: CPT | Performed by: PHYSICAL THERAPIST

## 2024-06-24 NOTE — TELEPHONE ENCOUNTER
Called patient to inform we had records at the office. Patient stated that she would like them shredded.

## 2024-06-24 NOTE — PROGRESS NOTES
Physical Therapy Daily Treatment Note  [address], [address], [place] [address]      Patient: Soheila Griffin   : 1965  Diagnosis/ICD-10 Code:  Aftercare following left knee joint replacement surgery [Z47.1, Z96.652]  Referring practitioner: Hardeep Acevedo MD  Date of Initial Visit: Type: THERAPY  Noted: 2024  Today's Date: 2024  Patient seen for 14 sessions             Subjective   Soheila Griffin reports: moderate pain/stiffness at beginning of session today.  Pt reports     Objective   L knee AROM: 0-  See Exercise, Manual, and Modality Logs for complete treatment.       Assessment/Plan  Patient tolerated all exercise progressions and manual therapy well today but continues to demonstrate L knee strength/ROM deficits limiting function. Pt continues to be limited in L quad activation.  Continue to progress per patient tolerance.    Progress per Plan of Care           Timed:  Manual Therapy:    15     mins  29446;  Therapeutic Exercise:    15     mins  63612;     Neuromuscular Antonio:    0    mins  73278;    Therapeutic Activity:     8     mins  10134;     Gait Trainin     mins  97468;     Ultrasound:     0     mins  68316;    Electrical Stimulation:    0     mins  29569;  Iontophoresis     0     mins  07563    Untimed:  Electrical Stimulation:    0     mins  42081 ( );  Mechanical Traction:    0     mins  96005;   Fluidotherapy     0     mins  71988  Hot pack     0     Mins    Cold pack                       0     Mins     Timed Treatment:   38   mins   Total Treatment:     49   mins        Rachel Hercules PT    Electronically signed @ROCÍO@  [place] License: [id number]  NPI number: [id number]

## 2024-06-27 ENCOUNTER — TREATMENT (OUTPATIENT)
Dept: PHYSICAL THERAPY | Facility: CLINIC | Age: 59
End: 2024-06-27
Payer: OTHER GOVERNMENT

## 2024-06-27 DIAGNOSIS — M25.662 DECREASED RANGE OF MOTION (ROM) OF LEFT KNEE: ICD-10-CM

## 2024-06-27 DIAGNOSIS — M25.562 ACUTE PAIN OF LEFT KNEE: ICD-10-CM

## 2024-06-27 DIAGNOSIS — Z47.1 AFTERCARE FOLLOWING LEFT KNEE JOINT REPLACEMENT SURGERY: Primary | ICD-10-CM

## 2024-06-27 DIAGNOSIS — Z96.652 AFTERCARE FOLLOWING LEFT KNEE JOINT REPLACEMENT SURGERY: Primary | ICD-10-CM

## 2024-06-27 NOTE — PROGRESS NOTES
Progress note  [address] [facility], [address] [place] [address]      Patient: Soheila Griffin   : 1965  Diagnosis/ICD-10 Code:  Aftercare following left knee joint replacement surgery [Z47.1, Z96.652]  Referring practitioner: Hardeep Acevedo MD  Date of Initial Visit: Type: THERAPY  Noted: 2024  Today's Date: 2024  Patient seen for 15 sessions        Subjective:   Soheila Griffin reports: 6/10 L knee pain.  Pt reports that she has had increased L knee pain since last session.  Pt reports prior to this she has noticed significant improvements in overall pain and function and was able to complete walking/stairs/transfers with decreased difficulty.  Pt reports that she continues to have tightness/pain limiting prolonged walking and sleeping.  Subjective Questionnaire: LEFS: 47/80      Subjective   Objective   Active Range of Motion   Left Knee   Flexion: 100 degrees with pain  Extension: 7 (lacking 0) degrees with pain     Passive Range of Motion   Left Knee   Flexion: 105 degrees with pain  Extension: 3 degrees with pain     Strength/Myotome Testing      Left Hip   Planes of Motion   Flexion: 4     Right Hip   Planes of Motion   Flexion: 4+     Left Knee   Flexion: 4+  Extension: 4     Right Knee   Flexion: 5  Extension: 5  Assessment & Plan       Assessment  Impairments: abnormal gait, abnormal muscle tone, abnormal or restricted ROM, activity intolerance, impaired balance, impaired physical strength, lacks appropriate home exercise program and pain with function   Functional limitations: lifting, sleeping, walking, uncomfortable because of pain, sitting, standing and unable to perform repetitive tasks   Assessment details: Patient demonstrates improvements in L knee strength/ROM and reports of pain/function during ADLs compared to initial evaluation.  Pt however continues to demonstrate decreased L knee ROM, L knee weakness, decreased quad activation, gait abnormalities and reports of pain  limiting function during ADLs as shown on the LEFS.  Patient would continue to benefit from skilled PT services in order to address deficits limiting function at this time.           Goals  Plan Goals: 1. The patient has limited ROM of the L knee.   LTG 1: 12 weeks:  The patient will demonstrate 0 to 120 degrees of ROM for the L knee in order to allow patient to complete prolonged walking, standing, stairs and other ADLs with decreased pain/difficulty.    STATUS:  not met, continue   STG 1a:  6 weeks:  The patient will demonstrate 5 to 105 degrees of ROM for the L knee.    STATUS:  not met, continue    2. The patient has limited strength of the left knee.   LTG 2: 12 weeks: The patient will demonstrate 4+/5 strength for L knee flexion and extension in order to allow patient improved joint stability    STATUS:  partially met, continue   STG 2a: 6 weeks: The patient will demonstrate 4/5 strength for L knee flexion and extension    STATUS:  met     3. The patient has gait dysfunction.   LTG 3: 12 weeks:  The patient will ambulate without assistive device, independently, for community distances with minimal limp to the L lower extremity in order to improve mobility and allow patient to perform activities such as grocery shopping with greater ease.    STATUS:  not met, continue    4. Mobility: Walking/Moving Around Functional Limitation     LTG 4: 12 weeks:  The patient will demonstrate 19 % limitation by achieving a score of 65 on the LEFS.    STATUS:  ongoing   STG 4 a: 6 weeks:  The patient will demonstrate 50 % limitation by achieving a score of 40 on the LEFS.      STATUS:  met    Plan  Therapy options: will be seen for skilled therapy services  Planned modality interventions: TENS, electrical stimulation/Russian stimulation, thermotherapy (hydrocollator packs) and cryotherapy  Planned therapy interventions: manual therapy, ADL retraining, balance/weight-bearing training, neuromuscular re-education, body mechanics  training, postural training, soft tissue mobilization, flexibility, spinal/joint mobilization, functional ROM exercises, strengthening, gait training, stretching, home exercise program, therapeutic activities, transfer training and joint mobilization  Frequency: 3x week (2-3)  Duration in weeks: 4  Treatment plan discussed with: patient      Progress toward previous goals: Partially Met        Recommendations: Continue as planned  Timeframe: 1 month  Prognosis to achieve goals: good    PT SIGNATURE: Rachel Hercules, PT     Electronically signed 2024  DATE TREATMENT INITIATED: 2024  [place] License: [id number]      Based upon review of the patient's progress and continued therapy plan, it is my medical opinion that Soheila Griffin should continue physical therapy treatment at [facility]  [address] [place] [id number] [phone].      Timed:  Manual Therapy:    10     mins  80985;  Therapeutic Exercise:    20     mins  26670;     Neuromuscular Antonio:    0    mins  90428;    Therapeutic Activity:     8     mins  46155;     Gait Trainin     mins  13857;     Ultrasound:     0     mins  06509;    Electrical Stimulation:    0     mins  45352 ( );    Untimed:  Electrical Stimulation:    0     mins  29289 ( );  Mechanical Traction:    0     mins  40868;     Timed Treatment:   38   mins   Total Treatment:     45   mins

## 2024-07-01 ENCOUNTER — TREATMENT (OUTPATIENT)
Dept: PHYSICAL THERAPY | Facility: CLINIC | Age: 59
End: 2024-07-01
Payer: OTHER GOVERNMENT

## 2024-07-01 DIAGNOSIS — M25.562 ACUTE PAIN OF LEFT KNEE: ICD-10-CM

## 2024-07-01 DIAGNOSIS — Z96.652 AFTERCARE FOLLOWING LEFT KNEE JOINT REPLACEMENT SURGERY: Primary | ICD-10-CM

## 2024-07-01 DIAGNOSIS — M25.662 DECREASED RANGE OF MOTION (ROM) OF LEFT KNEE: ICD-10-CM

## 2024-07-01 DIAGNOSIS — Z47.1 AFTERCARE FOLLOWING LEFT KNEE JOINT REPLACEMENT SURGERY: Primary | ICD-10-CM

## 2024-07-01 NOTE — PROGRESS NOTES
Physical Therapy Daily Treatment Note  [address] [facility], [address], [place] [address]      Patient: Soheila Griffin   : 1965  Diagnosis/ICD-10 Code:  Aftercare following left knee joint replacement surgery [Z47.1, Z96.652]  Referring practitioner: Hardeep Acevedo MD  Date of Initial Visit: Type: THERAPY  Noted: 2024  Today's Date: 2024  Patient seen for 16 sessions           Subjective   Soheila Griffin reports: mild L knee tightness.  Pt reports difficulty sleeping due to pain.    Objective   L knee flexion AROM: 100  See Exercise, Manual, and Modality Logs for complete treatment.       Assessment/Plan  Patient tolerated all exercise progressions and manual therapy well today but continues to demonstrate L knee strength/ROM deficits limiting function. Pt demonstrates improved L patellar mobility.  Continue to progress per patient tolerance.    Progress per Plan of Care           Timed:  Manual Therapy:    10     mins  49744;  Therapeutic Exercise:    35     mins  39782;     Neuromuscular Antonio:    0    mins  18541;    Therapeutic Activity:     8     mins  85894;     Gait Trainin     mins  88548;     Ultrasound:     0     mins  43948;    Electrical Stimulation:    0     mins  84347;  Iontophoresis     0     mins  78527    Untimed:  Electrical Stimulation:    0     mins  98947 ( );  Mechanical Traction:    0     mins  66736;   Fluidotherapy     0     mins  71709  Hot pack     0     Mins    Cold pack                       0     Mins     Timed Treatment:   53   mins   Total Treatment:     53   mins        Rachel Hercules PT    Electronically signed [unfilled]  [place] License: [id number]  NPI number: [id number]

## 2024-07-02 DIAGNOSIS — Z47.89 AFTERCARE FOLLOWING SURGERY OF THE MUSCULOSKELETAL SYSTEM: ICD-10-CM

## 2024-07-02 RX ORDER — TRAMADOL HYDROCHLORIDE 50 MG/1
50 TABLET ORAL EVERY 4 HOURS PRN
Qty: 42 TABLET | Refills: 0 | Status: SHIPPED | OUTPATIENT
Start: 2024-07-02

## 2024-07-03 ENCOUNTER — TREATMENT (OUTPATIENT)
Dept: PHYSICAL THERAPY | Facility: CLINIC | Age: 59
End: 2024-07-03
Payer: OTHER GOVERNMENT

## 2024-07-03 DIAGNOSIS — M25.662 DECREASED RANGE OF MOTION (ROM) OF LEFT KNEE: ICD-10-CM

## 2024-07-03 DIAGNOSIS — Z96.652 AFTERCARE FOLLOWING LEFT KNEE JOINT REPLACEMENT SURGERY: Primary | ICD-10-CM

## 2024-07-03 DIAGNOSIS — M25.562 ACUTE PAIN OF LEFT KNEE: ICD-10-CM

## 2024-07-03 DIAGNOSIS — Z47.1 AFTERCARE FOLLOWING LEFT KNEE JOINT REPLACEMENT SURGERY: Primary | ICD-10-CM

## 2024-07-03 NOTE — PROGRESS NOTES
Physical Therapy Daily Treatment Note  [address] [facility], [address], [place] [address]      Patient: Soheila Griffin   : 1965  Diagnosis/ICD-10 Code:  Aftercare following left knee joint replacement surgery [Z47.1, Z96.652]  Referring practitioner: Hardeep Acevedo MD  Date of Initial Visit: Type: THERAPY  Noted: 2024  Today's Date: 7/3/2024  Patient seen for 17 sessions             Subjective   Soheila Griffin reports: stiffness is continuing to cause difficulty sleeping at night.    Objective   R knee flexion AROM: 103 (prior to manual)  See Exercise, Manual, and Modality Logs for complete treatment.       Assessment/Plan  Patient tolerated all exercise progressions and manual therapy well today but continues to demonstrate R knee strength/ROM deficits limiting function.  Pt demonstrates improvements in R patellar mobility.  Continue to progress per patient tolerance.    Progress per Plan of Care           Timed:  Manual Therapy:    10     mins  63820;  Therapeutic Exercise:    33     mins  88808;     Neuromuscular Antonio:    0    mins  70194;    Therapeutic Activity:     10     mins  44926;     Gait Trainin     mins  13175;     Ultrasound:     0     mins  03229;    Electrical Stimulation:    0     mins  05849;  Iontophoresis     0     mins  67397    Untimed:  Electrical Stimulation:    0     mins  37793 ( );  Mechanical Traction:    0     mins  26272;   Fluidotherapy     0     mins  02363  Hot pack     0     Mins    Cold pack                       0     Mins     Timed Treatment:   53   mins   Total Treatment:     53   mins        Rachel Hercules PT    Electronically signed [unfilled]  [place] License: [id number]  NPI number: [id number]

## 2024-07-22 ENCOUNTER — OFFICE VISIT (OUTPATIENT)
Dept: ORTHOPEDIC SURGERY | Facility: CLINIC | Age: 59
End: 2024-07-22
Payer: OTHER GOVERNMENT

## 2024-07-22 VITALS
HEIGHT: 62 IN | BODY MASS INDEX: 37.73 KG/M2 | SYSTOLIC BLOOD PRESSURE: 146 MMHG | OXYGEN SATURATION: 96 % | DIASTOLIC BLOOD PRESSURE: 80 MMHG | HEART RATE: 78 BPM | WEIGHT: 205 LBS

## 2024-07-22 DIAGNOSIS — Z47.1 AFTERCARE FOLLOWING LEFT KNEE JOINT REPLACEMENT SURGERY: Primary | ICD-10-CM

## 2024-07-22 DIAGNOSIS — Z96.652 AFTERCARE FOLLOWING LEFT KNEE JOINT REPLACEMENT SURGERY: Primary | ICD-10-CM

## 2024-07-22 DIAGNOSIS — Z47.89 AFTERCARE FOLLOWING SURGERY OF THE MUSCULOSKELETAL SYSTEM: ICD-10-CM

## 2024-07-22 PROCEDURE — 99024 POSTOP FOLLOW-UP VISIT: CPT | Performed by: PHYSICIAN ASSISTANT

## 2024-07-22 NOTE — PROGRESS NOTES
Chief Complaint  Follow-up of the Left Knee    Subjective          History of Present Illness      Soheila Griffin is a 59 y.o. female  presents to [facility] for     Patient presents for follow-up evaluation of left total knee arthroplasty, 4/24/2024.  Patient states that she has been improving with therapy and home exercises she states she has been riding her bike.  She states she gets sore after more active days she states tramadol helps her pain she is using it sparingly.  Patient states she has good strength and mobility going up stairs but going downstairs she still guards herself and protects the descent.  She takes ibuprofen as needed with follow-up which also helps.  If she has pain she points to the anterior lateral knee and anterior medial knee.    Allergies   Allergen Reactions    Amoxicillin Rash     CHILDHOOD RX    Diclofenac Nausea Only and Dizziness    Metal [Nickel] Itching, Swelling and Rash     CHEAP JEWELRY     Oxycodone-Acetaminophen Palpitations    Sulfamethoxazole-Trimethoprim Photosensitivity        Social History     Socioeconomic History    Marital status:    Tobacco Use    Smoking status: Former     Current packs/day: 0.25     Average packs/day: 0.3 packs/day for 3.0 years (0.8 ttl pk-yrs)     Types: Cigarettes    Smokeless tobacco: Never   Vaping Use    Vaping status: Never Used   Substance and Sexual Activity    Alcohol use: Not Currently    Drug use: Never    Sexual activity: Yes     Partners: Male     Birth control/protection: Vasectomy        REVIEW OF SYSTEMS    Constitutional: Awake alert and oriented x3, no acute distress, denies fevers, chills, weight loss  Respiratory: No respiratory distress  Vascular: Brisk cap refill, Intact distal pulses, No cyanosis, compartments soft with no signs or symptoms of compartment syndrome or DVT.   Cardiovascular: Denies chest pain, shortness of breath  Skin: Denies rashes, acute skin changes  Neurologic:  "Denies headache, loss of consciousness  MSK: Left knee pain      Objective   Vital Signs:   /80   Pulse 78   Ht 157.5 cm (62.01\")   Wt 93 kg (205 lb)   SpO2 96%   BMI 37.49 kg/m²     Body mass index is 37.49 kg/m².    Physical Exam       Left knee: Well-healed incisions, no erythema, no ecchymosis or swelling, no signs of infection, full extension flexion 110, stable to varus/valgus stress stable anterior/posterior drawer nontender calf, negative Tyler testing      Procedures    Imaging Results (Most Recent)       Procedure Component Value Units Date/Time    XR Knee 3 View Left [[id number]] Resulted: 07/22/24 1713     Updated: 07/22/24 1713    Narrative:      View:AP/Lateral and Sunrise view(s)  Site: Left knee  Indication: Left knee pain  Study: X-rays ordered, taken in the office, and reviewed today  X-ray: Intact appearing left total knee arthroplasty, no signs of hardware   failure or loosening, no subsidence or periprosthetic fracture, good   alignment  Comparative data: Previous studies             Result Review :   The following data was reviewed by: RODO Dubon on 07/22/2024:  Data reviewed : Radiologic studies reviewed by me with the patient              Assessment and Plan    Diagnoses and all orders for this visit:    1. Aftercare following left total knee arthroplasty, 4/24/2024 (Primary)  -     XR Knee 3 View Left        Reviewed x-rays with the patient discussed diagnosis and treatment options with her she was advised to continue weightbearing and activity as tolerated, finish therapy, continue home exercises.  If any new or concerning symptoms occur call right away otherwise follow-up in 3 months for recheck with x-rays.    Call or return if worsening symptoms.    Follow Up   Return in about 3 months (around 10/22/2024) for Recheck.  Patient was given instructions and counseling regarding her condition or for health maintenance advice. Please see specific information pulled " into the AVS if appropriate.       EMR Dragon/Transcription disclaimer:  Part of this note may be an electronic transcription/translation of spoken language to printed text using the Dragon Dictation System

## 2024-07-23 ENCOUNTER — TREATMENT (OUTPATIENT)
Dept: PHYSICAL THERAPY | Facility: CLINIC | Age: 59
End: 2024-07-23
Payer: OTHER GOVERNMENT

## 2024-07-23 DIAGNOSIS — M25.662 DECREASED RANGE OF MOTION (ROM) OF LEFT KNEE: ICD-10-CM

## 2024-07-23 DIAGNOSIS — M25.562 ACUTE PAIN OF LEFT KNEE: ICD-10-CM

## 2024-07-23 DIAGNOSIS — Z96.652 AFTERCARE FOLLOWING LEFT KNEE JOINT REPLACEMENT SURGERY: Primary | ICD-10-CM

## 2024-07-23 DIAGNOSIS — Z47.1 AFTERCARE FOLLOWING LEFT KNEE JOINT REPLACEMENT SURGERY: Primary | ICD-10-CM

## 2024-07-23 RX ORDER — TRAMADOL HYDROCHLORIDE 50 MG/1
50 TABLET ORAL EVERY 6 HOURS PRN
Qty: 28 TABLET | Refills: 0 | Status: SHIPPED | OUTPATIENT
Start: 2024-07-23

## 2024-07-23 NOTE — PROGRESS NOTES
"Physical Therapy Daily Treatment Note  [address] [facility], [address] [place] [address]        Patient: Soheila Griffin   : 1965  Diagnosis/ICD-10 Code:  Aftercare following left knee joint replacement surgery [Z47.1, Z96.652]  Referring practitioner: Hardeep Acevedo MD  Date of Initial Visit: Type: THERAPY  Noted: 2024  Today's Date: 2024  Patient seen for 18 sessions             Subjective     Soheila Griffin reports that she has been feeling much better and denies having any left knee pain upon arrival today.  She reports that she was able to ride 10 miles on electric bike and only reports  having mild \"Soreness\" and \"Tenderness on the inside and outside of her knee.  She reports that she had her follow-up with Orthopedic and states they were pleased with status of her knee and she could be discharged from formal physical therapy at this time.      Subjective Questionnaire: LEFS: 57/80   Objective     Active Range of Motion   Left Knee   Flexion: 107 degrees with pain  Extension: 3 (lacking 0) degrees     Passive Range of Motion   Left Knee   Flexion: 110 degrees with pain  Extension: 0 degrees with pain     Strength/Myotome Testing      Left Hip   Planes of Motion   Flexion: 4+/5     Right Hip   Planes of Motion   Flexion: 4+/5     Left Knee   Flexion: 4/5  Extension: 4+/5     Right Knee   Flexion: 5/5  Extension: 5/5    See Exercise and Manual Logs for complete treatment.          Goals  Plan Goals: 1. The patient has limited ROM of the L knee.              LTG 1: 12 weeks:  The patient will demonstrate 0 to 120 degrees of ROM for the L knee in order to allow patient to complete prolonged walking, standing, stairs and other ADLs with decreased pain/difficulty.                          STATUS:  IMPROVED, BUT UNMET (107-110 degrees)              STG 1a:  6 weeks:  The patient will demonstrate 5 to 105 degrees of ROM for the L knee.                          STATUS:  MET     2. The patient has " limited strength of the left knee.              LTG 2: 12 weeks: The patient will demonstrate 4+/5 strength for L knee flexion and extension in order to allow patient improved joint stability                          STATUS:  MET- FOR EXTENSION, IMPROVED BUT UNMET FOR FLEXION              STG 2a: 6 weeks: The patient will demonstrate 4/5 strength for L knee flexion and extension                          STATUS:  MET                3. The patient has gait dysfunction.              LTG 3: 12 weeks:  The patient will ambulate without assistive device, independently, for community distances with minimal limp to the L lower extremity in order to improve mobility and allow patient to perform activities such as grocery shopping with greater ease.                          STATUS:  MET     4. Mobility: Walking/Moving Around Functional Limitation                               LTG 4: 12 weeks:  The patient will demonstrate 19 % limitation by achieving a score of 65 on the LEFS.                          STATUS:  IMPROVED, BUT UNMET (57/80)              STG 4 a: 6 weeks:  The patient will demonstrate 50 % limitation by achieving a score of 40 on the LEFS.                            STATUS:  MET      Assessment/Plan    Pt has tolerated therapy sessions well - with progression of therapeutic exercises, CKC-Functional activities, and Manual therapy.  She has made notable improvements in all areas and now exhibits Left knee ROM and strength that is WFLs.  She reports improved functional ability to Ride bike and Climb stairs with less pain and difficulty.   She is Independent with progressive HEP and  She has met or partially met all therapy goals at this time.          Plan:    Pt discharged from outpatient physical therapy at this time.  She has been instructed to continue with HEP and follow-up with Orthopedic as scheduled.  Pt verbalized good understanding and agreement with plan.               Timed:  Manual Therapy:    8      mins  35728;  Therapeutic Exercise:    20     mins  75929;     Neuromuscular Antonio:    0    mins  49377;    Therapeutic Activity:     10     mins  99751;     Gait Trainin     mins  83239;     Ultrasound:     0     mins  78279;    Electrical Stimulation:    0     mins  68401;  Iontophoresis     0     mins  37710    Untimed:  Electrical Stimulation:    0     mins  83155 ( );  Mechanical Traction:    0     mins  79814;   Fluidotherapy     0     mins  56574  Hot pack     00     mins  63075  Cold pack     0     mins  87287    Timed Treatment:   38   mins   Total Treatment:     38   mins        Lily Kovacs PTA  Physical Therapist Assistant

## 2024-08-01 ENCOUNTER — OFFICE VISIT (OUTPATIENT)
Dept: INTERNAL MEDICINE | Facility: CLINIC | Age: 59
End: 2024-08-01
Payer: OTHER GOVERNMENT

## 2024-08-01 VITALS
SYSTOLIC BLOOD PRESSURE: 124 MMHG | OXYGEN SATURATION: 96 % | HEIGHT: 62 IN | DIASTOLIC BLOOD PRESSURE: 78 MMHG | HEART RATE: 84 BPM | RESPIRATION RATE: 16 BRPM | WEIGHT: 207 LBS | BODY MASS INDEX: 38.09 KG/M2 | TEMPERATURE: 97.6 F

## 2024-08-01 DIAGNOSIS — Z47.89 AFTERCARE FOLLOWING SURGERY OF THE MUSCULOSKELETAL SYSTEM: ICD-10-CM

## 2024-08-01 DIAGNOSIS — J01.20 ACUTE ETHMOIDAL SINUSITIS, RECURRENCE NOT SPECIFIED: ICD-10-CM

## 2024-08-01 DIAGNOSIS — E55.9 VITAMIN D DEFICIENCY: ICD-10-CM

## 2024-08-01 DIAGNOSIS — E03.9 HYPOTHYROIDISM, UNSPECIFIED TYPE: Primary | ICD-10-CM

## 2024-08-01 DIAGNOSIS — F41.1 GENERALIZED ANXIETY DISORDER: ICD-10-CM

## 2024-08-01 DIAGNOSIS — F33.1 MAJOR DEPRESSIVE DISORDER, RECURRENT EPISODE, MODERATE DEGREE: ICD-10-CM

## 2024-08-01 LAB
25(OH)D3 SERPL-MCNC: 40.8 NG/ML (ref 30–100)
ALBUMIN SERPL-MCNC: 4.4 G/DL (ref 3.5–5.2)
ALBUMIN/GLOB SERPL: 1.6 G/DL
ALP SERPL-CCNC: 111 U/L (ref 39–117)
ALT SERPL W P-5'-P-CCNC: 21 U/L (ref 1–33)
ANION GAP SERPL CALCULATED.3IONS-SCNC: 8.4 MMOL/L (ref 5–15)
AST SERPL-CCNC: 22 U/L (ref 1–32)
BASOPHILS # BLD AUTO: 0.05 10*3/MM3 (ref 0–0.2)
BASOPHILS NFR BLD AUTO: 0.8 % (ref 0–1.5)
BILIRUB SERPL-MCNC: 0.2 MG/DL (ref 0–1.2)
BUN SERPL-MCNC: 17 MG/DL (ref 8–23)
BUN/CREAT SERPL: 23 (ref 7–25)
CALCIUM SPEC-SCNC: 9.5 MG/DL (ref 8.6–10.5)
CHLORIDE SERPL-SCNC: 104 MMOL/L (ref 98–107)
CHOLEST SERPL-MCNC: 257 MG/DL (ref 0–200)
CO2 SERPL-SCNC: 27.6 MMOL/L (ref 22–29)
CREAT SERPL-MCNC: 0.74 MG/DL (ref 0.57–1)
DEPRECATED RDW RBC AUTO: 39.2 FL (ref 37–54)
EGFRCR SERPLBLD CKD-EPI 2021: 92.8 ML/MIN/1.73
EOSINOPHIL # BLD AUTO: 0.22 10*3/MM3 (ref 0–0.4)
EOSINOPHIL NFR BLD AUTO: 3.5 % (ref 0.3–6.2)
ERYTHROCYTE [DISTWIDTH] IN BLOOD BY AUTOMATED COUNT: 13.6 % (ref 12.3–15.4)
GLOBULIN UR ELPH-MCNC: 2.7 GM/DL
GLUCOSE SERPL-MCNC: 93 MG/DL (ref 65–99)
HCT VFR BLD AUTO: 40.9 % (ref 34–46.6)
HDLC SERPL-MCNC: 50 MG/DL (ref 40–60)
HGB BLD-MCNC: 13.2 G/DL (ref 12–15.9)
IMM GRANULOCYTES # BLD AUTO: 0.02 10*3/MM3 (ref 0–0.05)
IMM GRANULOCYTES NFR BLD AUTO: 0.3 % (ref 0–0.5)
LDLC SERPL CALC-MCNC: 178 MG/DL (ref 0–100)
LDLC/HDLC SERPL: 3.5 {RATIO}
LYMPHOCYTES # BLD AUTO: 1.82 10*3/MM3 (ref 0.7–3.1)
LYMPHOCYTES NFR BLD AUTO: 29 % (ref 19.6–45.3)
MCH RBC QN AUTO: 26 PG (ref 26.6–33)
MCHC RBC AUTO-ENTMCNC: 32.3 G/DL (ref 31.5–35.7)
MCV RBC AUTO: 80.5 FL (ref 79–97)
MONOCYTES # BLD AUTO: 0.47 10*3/MM3 (ref 0.1–0.9)
MONOCYTES NFR BLD AUTO: 7.5 % (ref 5–12)
NEUTROPHILS NFR BLD AUTO: 3.7 10*3/MM3 (ref 1.7–7)
NEUTROPHILS NFR BLD AUTO: 58.9 % (ref 42.7–76)
NRBC BLD AUTO-RTO: 0 /100 WBC (ref 0–0.2)
PLATELET # BLD AUTO: 251 10*3/MM3 (ref 140–450)
PMV BLD AUTO: 9.4 FL (ref 6–12)
POTASSIUM SERPL-SCNC: 4.5 MMOL/L (ref 3.5–5.2)
PROT SERPL-MCNC: 7.1 G/DL (ref 6–8.5)
RBC # BLD AUTO: 5.08 10*6/MM3 (ref 3.77–5.28)
SODIUM SERPL-SCNC: 140 MMOL/L (ref 136–145)
T4 FREE SERPL-MCNC: 0.95 NG/DL (ref 0.92–1.68)
TRIGL SERPL-MCNC: 160 MG/DL (ref 0–150)
TSH SERPL DL<=0.05 MIU/L-ACNC: 3.37 UIU/ML (ref 0.27–4.2)
VLDLC SERPL-MCNC: 29 MG/DL (ref 5–40)
WBC NRBC COR # BLD AUTO: 6.28 10*3/MM3 (ref 3.4–10.8)

## 2024-08-01 PROCEDURE — 84443 ASSAY THYROID STIM HORMONE: CPT | Performed by: PHYSICIAN ASSISTANT

## 2024-08-01 PROCEDURE — 82306 VITAMIN D 25 HYDROXY: CPT | Performed by: PHYSICIAN ASSISTANT

## 2024-08-01 PROCEDURE — 99214 OFFICE O/P EST MOD 30 MIN: CPT | Performed by: PHYSICIAN ASSISTANT

## 2024-08-01 PROCEDURE — 85025 COMPLETE CBC W/AUTO DIFF WBC: CPT | Performed by: PHYSICIAN ASSISTANT

## 2024-08-01 PROCEDURE — 80061 LIPID PANEL: CPT | Performed by: PHYSICIAN ASSISTANT

## 2024-08-01 PROCEDURE — 80053 COMPREHEN METABOLIC PANEL: CPT | Performed by: PHYSICIAN ASSISTANT

## 2024-08-01 PROCEDURE — 84439 ASSAY OF FREE THYROXINE: CPT | Performed by: PHYSICIAN ASSISTANT

## 2024-08-01 PROCEDURE — 36415 COLL VENOUS BLD VENIPUNCTURE: CPT | Performed by: PHYSICIAN ASSISTANT

## 2024-08-01 RX ORDER — CITALOPRAM 20 MG/1
20 TABLET ORAL NIGHTLY
Qty: 90 TABLET | Refills: 1 | Status: SHIPPED | OUTPATIENT
Start: 2024-08-01

## 2024-08-01 RX ORDER — IBUPROFEN 800 MG/1
800 TABLET ORAL EVERY 6 HOURS PRN
Qty: 90 TABLET | Refills: 1 | Status: SHIPPED | OUTPATIENT
Start: 2024-08-01

## 2024-08-01 RX ORDER — CETIRIZINE HYDROCHLORIDE 10 MG/1
10 TABLET ORAL DAILY
Qty: 90 TABLET | Refills: 1 | Status: SHIPPED | OUTPATIENT
Start: 2024-08-01

## 2024-08-01 RX ORDER — LEVOTHYROXINE SODIUM 0.03 MG/1
25 TABLET ORAL
Qty: 90 TABLET | Refills: 1 | Status: SHIPPED | OUTPATIENT
Start: 2024-08-01
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
## 2024-08-02 ENCOUNTER — PATIENT MESSAGE (OUTPATIENT)
Dept: INTERNAL MEDICINE | Facility: CLINIC | Age: 59
End: 2024-08-02
Payer: OTHER GOVERNMENT

## 2024-08-02 RX ORDER — ATORVASTATIN CALCIUM 10 MG/1
10 TABLET, FILM COATED ORAL DAILY
Qty: 90 TABLET | Refills: 1 | Status: SHIPPED | OUTPATIENT
Start: 2024-08-02

## 2024-08-02 NOTE — TELEPHONE ENCOUNTER
From: Soheila Griffin  To: Brittnee Kovacs  Sent: 8/2/2024 10:57 AM EDT  Subject: Medication     Tonny Beaulieu. Go ahead and start me on a 25mg of the cholesterol medicine. Anything higher messes with me

## 2024-09-03 ENCOUNTER — TELEPHONE (OUTPATIENT)
Dept: INTERNAL MEDICINE | Facility: CLINIC | Age: 59
End: 2024-09-03
Payer: OTHER GOVERNMENT

## 2024-09-03 ENCOUNTER — OFFICE VISIT (OUTPATIENT)
Dept: INTERNAL MEDICINE | Facility: CLINIC | Age: 59
End: 2024-09-03
Payer: OTHER GOVERNMENT

## 2024-09-03 VITALS
BODY MASS INDEX: 39.42 KG/M2 | SYSTOLIC BLOOD PRESSURE: 126 MMHG | RESPIRATION RATE: 16 BRPM | OXYGEN SATURATION: 98 % | HEIGHT: 62 IN | HEART RATE: 69 BPM | TEMPERATURE: 98.1 F | WEIGHT: 214.2 LBS | DIASTOLIC BLOOD PRESSURE: 76 MMHG

## 2024-09-03 DIAGNOSIS — J20.9 ACUTE BRONCHITIS, UNSPECIFIED ORGANISM: Primary | ICD-10-CM

## 2024-09-03 PROCEDURE — 99213 OFFICE O/P EST LOW 20 MIN: CPT | Performed by: NURSE PRACTITIONER

## 2024-09-03 RX ORDER — PREDNISONE 20 MG/1
40 TABLET ORAL DAILY
Qty: 10 TABLET | Refills: 0 | Status: SHIPPED | OUTPATIENT
Start: 2024-09-03

## 2024-09-03 RX ORDER — CEFDINIR 300 MG/1
300 CAPSULE ORAL 2 TIMES DAILY
Qty: 20 CAPSULE | Refills: 0 | Status: SHIPPED | OUTPATIENT
Start: 2024-09-03 | End: 2024-09-13

## 2024-09-03 RX ORDER — BENZONATATE 200 MG/1
200 CAPSULE ORAL 3 TIMES DAILY PRN
Qty: 15 CAPSULE | Refills: 0 | Status: SHIPPED | OUTPATIENT
Start: 2024-09-03

## 2024-09-03 NOTE — PROGRESS NOTES
"Chief Complaint  Cough (Has had cough for over a month ) and Nasal Congestion    Subjective        Soheila Griffin presents to [facility] for concerns for ongoing cough.  Patient reports that she has been having a cough now for 4 weeks.  She has had some congestion and drainage that has accompanied.  She has been using over-the-counter cough medication without any significant relief.    Objective   Vital Signs:   /76 (BP Location: Left arm, Patient Position: Sitting, Cuff Size: Adult)   Pulse 69   Temp 98.1 °F (36.7 °C) (Temporal)   Resp 16   Ht 157.5 cm (62.01\")   Wt 97.2 kg (214 lb 3.2 oz)   SpO2 98%   BMI 39.17 kg/m²     Physical Exam  Vitals and nursing note reviewed.   Constitutional:       Appearance: Normal appearance.   HENT:      Head: Normocephalic and atraumatic.      Right Ear: Tympanic membrane, ear canal and external ear normal.      Left Ear: Tympanic membrane, ear canal and external ear normal.      Nose: Congestion present.      Mouth/Throat:      Mouth: Mucous membranes are moist.      Pharynx: Oropharynx is clear.   Eyes:      Conjunctiva/sclera: Conjunctivae normal.      Pupils: Pupils are equal, round, and reactive to light.   Cardiovascular:      Rate and Rhythm: Normal rate and regular rhythm.   Pulmonary:      Effort: Pulmonary effort is normal.      Breath sounds: Normal breath sounds.   Skin:     Capillary Refill: Capillary refill takes less than 2 seconds.   Neurological:      Mental Status: She is alert.        Result Review :  {The following data was reviewed by EFE Segovia on 09/03/24                Diagnoses and all orders for this visit:    1. Acute bronchitis, unspecified organism (Primary)    Other orders  -     cefdinir (OMNICEF) 300 MG capsule; Take 1 capsule by mouth 2 (Two) Times a Day for 10 days.  Dispense: 20 capsule; Refill: 0  -     predniSONE (DELTASONE) 20 MG tablet; Take 2 tablets by mouth Daily.  Dispense: 10 tablet; " Refill: 0  -     benzonatate (TESSALON) 200 MG capsule; Take 1 capsule by mouth 3 (Three) Times a Day As Needed for Cough.  Dispense: 15 capsule; Refill: 0    Symptoms ongoing for nearly a month, she has tried and failed with conservative management.  We will treat with antibiotics and steroid.  Tessalon Perles sent for symptom management.  Can follow-up if not improving.      Follow Up   No follow-ups on file.  Patient was given instructions and counseling regarding her condition or for health maintenance advice. Please see specific information pulled into the AVS if appropriate.     Juan A Kaminski, APRPHONG  9/3/2024  This note was electronically signed.

## 2024-09-03 NOTE — TELEPHONE ENCOUNTER
Caller: Soheila Griffin    Relationship to patient: Self    Best call back number: [phone]    Chief complaint: COUGH/CONGESTION     Type of visit: OFFICE     Requested date: ANY DAY THIS WEEK     Additional notes: PATIENT PREFERS TO ONLY SEE RODO SANCHEZ

## 2024-09-10 ENCOUNTER — PATIENT MESSAGE (OUTPATIENT)
Dept: INTERNAL MEDICINE | Facility: CLINIC | Age: 59
End: 2024-09-10
Payer: OTHER GOVERNMENT

## 2024-09-10 DIAGNOSIS — G47.33 OBSTRUCTIVE SLEEP APNEA: Primary | ICD-10-CM

## 2024-09-13 PROBLEM — G47.33 OBSTRUCTIVE SLEEP APNEA: Status: ACTIVE | Noted: 2024-09-13

## 2024-09-13 NOTE — TELEPHONE ENCOUNTER
From: Soheila Griffin  To: Brittnee Kovacs  Sent: 9/10/2024 4:36 PM EDT  Subject: Cpap    Hello. My cpap needs to be replaced by [facility]  and they are needing an order from my PCM in order to karen me a new one. The fax number is  Fax: [phone]  
No

## 2024-09-24 ENCOUNTER — OFFICE VISIT (OUTPATIENT)
Dept: SLEEP MEDICINE | Facility: HOSPITAL | Age: 59
End: 2024-09-24
Payer: OTHER GOVERNMENT

## 2024-09-24 VITALS — HEART RATE: 72 BPM | BODY MASS INDEX: 38.64 KG/M2 | HEIGHT: 62 IN | WEIGHT: 210 LBS | OXYGEN SATURATION: 97 %

## 2024-09-24 DIAGNOSIS — G47.10 HYPERSOMNIA: ICD-10-CM

## 2024-09-24 DIAGNOSIS — G47.30 SLEEP APNEA, UNSPECIFIED TYPE: Primary | ICD-10-CM

## 2024-09-24 DIAGNOSIS — G47.8 NON-RESTORATIVE SLEEP: ICD-10-CM

## 2024-09-24 DIAGNOSIS — T88.8XXA MALFUNCTION OF CONTINUOUS POSITIVE AIRWAY PRESSURE (CPAP) OR BILEVEL POSITIVE AIRWAY PRESSURE (BPAP) MACHINE, INITIAL ENCOUNTER: ICD-10-CM

## 2024-09-24 DIAGNOSIS — E66.9 OBESITY (BMI 30-39.9): ICD-10-CM

## 2024-09-24 DIAGNOSIS — R06.83 SNORING: ICD-10-CM

## 2024-09-24 PROCEDURE — 99204 OFFICE O/P NEW MOD 45 MIN: CPT | Performed by: FAMILY MEDICINE

## 2024-09-24 PROCEDURE — G0463 HOSPITAL OUTPT CLINIC VISIT: HCPCS

## 2024-10-10 ENCOUNTER — HOSPITAL ENCOUNTER (OUTPATIENT)
Dept: SLEEP MEDICINE | Facility: HOSPITAL | Age: 59
End: 2024-10-10
Payer: OTHER GOVERNMENT

## 2024-10-10 DIAGNOSIS — T88.8XXA MALFUNCTION OF CONTINUOUS POSITIVE AIRWAY PRESSURE (CPAP) OR BILEVEL POSITIVE AIRWAY PRESSURE (BPAP) MACHINE, INITIAL ENCOUNTER: ICD-10-CM

## 2024-10-10 DIAGNOSIS — G47.30 SLEEP APNEA, UNSPECIFIED TYPE: ICD-10-CM

## 2024-10-10 DIAGNOSIS — R06.83 SNORING: ICD-10-CM

## 2024-10-10 DIAGNOSIS — G47.8 NON-RESTORATIVE SLEEP: ICD-10-CM

## 2024-10-10 DIAGNOSIS — G47.10 HYPERSOMNIA: ICD-10-CM

## 2024-10-10 DIAGNOSIS — E66.9 OBESITY (BMI 30-39.9): ICD-10-CM

## 2024-10-10 PROCEDURE — 95806 SLEEP STUDY UNATT&RESP EFFT: CPT

## 2024-10-28 ENCOUNTER — OFFICE VISIT (OUTPATIENT)
Dept: ORTHOPEDIC SURGERY | Facility: CLINIC | Age: 59
End: 2024-10-28
Payer: OTHER GOVERNMENT

## 2024-10-28 VITALS
HEIGHT: 62 IN | WEIGHT: 210 LBS | OXYGEN SATURATION: 96 % | HEART RATE: 88 BPM | DIASTOLIC BLOOD PRESSURE: 83 MMHG | SYSTOLIC BLOOD PRESSURE: 132 MMHG | BODY MASS INDEX: 38.64 KG/M2

## 2024-10-28 DIAGNOSIS — Z47.1 AFTERCARE FOLLOWING LEFT KNEE JOINT REPLACEMENT SURGERY: Primary | ICD-10-CM

## 2024-10-28 DIAGNOSIS — Z96.652 AFTERCARE FOLLOWING LEFT KNEE JOINT REPLACEMENT SURGERY: Primary | ICD-10-CM

## 2024-10-28 DIAGNOSIS — M25.562 LEFT KNEE PAIN, UNSPECIFIED CHRONICITY: ICD-10-CM

## 2024-10-28 PROCEDURE — 99213 OFFICE O/P EST LOW 20 MIN: CPT | Performed by: PHYSICIAN ASSISTANT

## 2024-10-28 RX ORDER — CITALOPRAM HYDROBROMIDE 20 MG/1
20 TABLET ORAL NIGHTLY
Qty: 90 TABLET | Refills: 1 | Status: SHIPPED | OUTPATIENT
Start: 2024-10-28

## 2024-10-28 NOTE — PROGRESS NOTES
"Chief Complaint  Follow-up of the Left Knee    Subjective          History of Present Illness      Soheila Griffin is a 59 y.o. female  presents to [facility] for     Patient presents for follow-up evaluation of left total knee arthroplasty, 2024.  She states her knee is \"doing really good \".  She denies difficulty with range of motion or strength she states going up stairs she has no trouble going downstairs she has a little more careful but in general she is happy with her progress.  If she has pain she points to the superior knee above her kneecap as her area of pain.  She states she is very active she rides an electric bike she goes camping and travels.      Allergies   Allergen Reactions    Amoxicillin Rash     CHILDHOOD RX    Diclofenac Nausea Only and Dizziness    Metal [Nickel] Itching, Swelling and Rash     CHEAP JEWELRY     Oxycodone-Acetaminophen Palpitations    Sulfamethoxazole-Trimethoprim Photosensitivity        Social History     Socioeconomic History    Marital status:    Tobacco Use    Smoking status: Former     Current packs/day: 0.00     Average packs/day: 0.3 packs/day for 3.0 years (0.8 ttl pk-yrs)     Types: Cigarettes     Start date:      Quit date:      Years since quittin.8    Smokeless tobacco: Never   Vaping Use    Vaping status: Never Used   Substance and Sexual Activity    Alcohol use: Not Currently    Drug use: Never    Sexual activity: Yes     Partners: Male     Birth control/protection: Vasectomy        REVIEW OF SYSTEMS    Constitutional: Awake alert and oriented x3, no acute distress, denies fevers, chills, weight loss  Respiratory: No respiratory distress  Vascular: Brisk cap refill, Intact distal pulses, No cyanosis, compartments soft with no signs or symptoms of compartment syndrome or DVT.   Cardiovascular: Denies chest pain, shortness of breath  Skin: Denies rashes, acute skin changes  Neurologic: Denies headache, loss of " "consciousness  MSK: Left knee pain      Objective   Vital Signs:   /83   Pulse 88   Ht 157.5 cm (62.01\")   Wt 95.3 kg (210 lb)   SpO2 96%   BMI 38.40 kg/m²     Body mass index is 38.4 kg/m².    Physical Exam       Left knee: Well-healed incision, no erythema, no ecchymosis or swelling, no signs of infection full extension flexion 115, stable to varus/valgus stress stable anterior/posterior drawer nontender calf, negative Tyler testing, mild tenderness to palpation of the superior patellar region in the area of the quadricep tendon, no palpable gap, patient able hold straight leg raise, 5 out of 5 strength, no pain with resisted range of motion      Procedures    Imaging Results (Most Recent)       Procedure Component Value Units Date/Time    XR Knee 3 View Left [[id number]] Resulted: 10/28/24 1201     Updated: 10/28/24 1201    Narrative:      View:AP/Lateral and Sunrise view(s)  Site: Left knee  Indication: Left knee pain  Study: X-rays ordered, taken in the office, and reviewed today  X-ray: Intact appearing left total knee arthroplasty, no signs of hardware   failure or loosening, no subsidence or periprosthetic fracture, good   alignment  Comparative data: Previous studies             Result Review :   The following data was reviewed by: RODO Dubon on 10/28/2024:  Data reviewed : Radiologic studies reviewed by me with the patient              Assessment and Plan    Diagnoses and all orders for this visit:    1. Aftercare following left total knee arthroplasty, 4/24/2024 (Primary)    2. Left knee pain, unspecified chronicity  -     XR Knee 3 View Left        Reviewed x-rays with the patient discussed diagnosis and treatment options with her she was advised to continue activity and weightbearing as tolerated follow-up in 6 months for recheck with x-rays    Call or return if worsening symptoms.    Follow Up   Return in about 6 months (around 4/28/2025) for Recheck.  Patient was given " instructions and counseling regarding her condition or for health maintenance advice. Please see specific information pulled into the AVS if appropriate.       EMR Dragon/Transcription disclaimer:  Part of this note may be an electronic transcription/translation of spoken language to printed text using the Dragon Dictation System

## 2024-10-30 DIAGNOSIS — R06.83 SNORING: ICD-10-CM

## 2024-10-30 DIAGNOSIS — G47.33 OBSTRUCTIVE SLEEP APNEA: Primary | ICD-10-CM

## 2024-10-30 DIAGNOSIS — G47.8 NON-RESTORATIVE SLEEP: ICD-10-CM

## 2024-10-30 DIAGNOSIS — E66.9 OBESITY (BMI 30-39.9): ICD-10-CM

## 2024-10-30 DIAGNOSIS — G47.10 HYPERSOMNIA: ICD-10-CM

## 2024-10-30 DIAGNOSIS — T88.8XXA MALFUNCTION OF CONTINUOUS POSITIVE AIRWAY PRESSURE (CPAP) OR BILEVEL POSITIVE AIRWAY PRESSURE (BPAP) MACHINE, INITIAL ENCOUNTER: ICD-10-CM

## 2024-11-25 NOTE — PROGRESS NOTES
"Well Woman Visit    CC: Scheduled annual well gyn visit  Chief Complaint   Patient presents with    Gynecologic Exam         Post menopausal, using no HRT    Last Completed Pap Smear            PAP SMEAR (Every 3 Years) Next due on 1/3/2026      2023  IgP, Aptima HPV    2022  IGP,rfx Aptima HPV All Pth    2022  IGP,rfx Aptima HPV All Pth                   Last Completed Mammogram            Ordered - MAMMOGRAM (Every 2 Years) Ordered on 2024  Mammo Screening Digital Tomosynthesis Bilateral With CAD    2023  Mammo Screening Digital Tomosynthesis Bilateral With CAD                     HPI:   59 y.o.     History of Present Illness  The patient is a 59-year-old female who presents for an annual exam.    She reports no significant health concerns at this time. She has been diagnosed with lichen sclerosis and uses clobetasol cream to manage flare-ups. She does not experience vaginal dryness, painful intercourse, or urinary tract infections. However, she reports occasional pain in the area of her ovaries, which lasts for a day or two before subsiding. This pain has been present for approximately one to two months. Initially, she suspected a urinary tract infection, but there was no associated burning sensation during urination. She has increased her water intake and feels well overall. She has declined the use of vaginal vulvar estrogen. She also reports itching and irritation in the vaginal area, which she manages with clobetasol cream. She recently completed a course of antibiotics.    Additionally, she had a knee replacement and was given ibuprofen 800 mg, which she is almost out of.     PCP: does manage PMHx and preventative labs  History: PMHx, Meds, Allergies, PSHx, Social Hx, and POBHx all reviewed and updated.      PHYSICAL EXAM:  /77   Pulse 83   Ht 157.5 cm (62.01\")   Wt 102 kg (225 lb)   BMI 41.14 kg/m²  Not found.  General- NAD, alert and oriented, " appropriate  Psych- Normal mood, good memory  Neck- No masses, no thyroid enlargement  CV- Regular rhythm, no murnurs  Resp- CTA to bases, no wheezes  Abdomen- Soft, non distended, non tender, no masses  Breast left-  Bilaterally symmetrical, no masses, non tender, no nipple discharge  Breast right- Bilaterally symmetrical, no masses, non tender, no nipple discharge  External genitalia- Normal female, moderate vulvovaginal atrophy with moderate resorption bilateral labia minora and severe clitoral phimosis.  No active lichen sclerosus.  Symmetric, bilateral erythema with satellite lesions on the perineum extending to bilateral perianal area onto the buttocks   Urethra/meatus- Normal, no masses, non tender  Bladder- Normal, no masses, non tender  Vagina- Normal, moderate atrophy, no lesions, no discharge.    Cvx- Normal, no lesions, no discharge, No cervical motion tenderness  Uterus- Normal size, shape & consistency.  Non tender, mobile.  Adnexa- No mass, non tender  Anus/Rectum/Perineum- Not performed  Lymphatic- No palpable neck, axillary, or groin nodes  Ext- No edema, no cyanosis    Skin- No lesions, no rashes, no acanthosis nigricans      ASSESSMENT and PLAN:    Diagnoses and all orders for this visit:    1. Well woman exam with routine gynecological exam (Primary)    2. Lichen sclerosus of female genitalia  -     clobetasol (TEMOVATE) 0.05 % ointment; Apply to affected area 3x/week.  Continue to soak in clean, warm water for 15 min prior to application and massage into the skin for  seconds  Dispense: 60 g; Refill: 3    3. Genitourinary syndrome of menopause    4. Encounter for screening mammogram for malignant neoplasm of breast  -     Mammo Screening Digital Tomosynthesis Bilateral With CAD; Future    5. Pelvic pain  -     US Non-ob Transvaginal; Future    6. Vulvovaginitis due to yeast  -     clotrimazole-betamethasone (LOTRISONE) 1-0.05 % cream; Apply 1 Application topically to the appropriate area  as directed 2 (Two) Times a Day.  Dispense: 45 g; Refill: 1        Assessment & Plan  1. Lichen Sclerosus.  Her lichen sclerosus is stable with no active disease. She is advised to use clobetasol cream 2-3 times a week for maintenance therapy to keep the tissue healthier and decrease flare-ups. A prescription for clobetasol cream will be provided.    2. Vulvar Yeast Infection.  There is some vulvar yeast present, identified by red irritation and satellite lesions. She has recently been on antibiotics, which likely contributed to the yeast infection. Lotrisone cream, which is part steroid and mostly antifungal, will be prescribed and should be applied twice daily to the affected area.    3. Pelvic Pain.  She has been experiencing pelvic pain on the right side for the past 1 to 2 months. A pelvic ultrasound will be ordered to investigate the cause of her pelvic pain. She will be called with the results of the ultrasound.    4. Health Maintenance.  Her last Pap smear, conducted in 2023, showed normal results and negative HPV. The next Pap smear is not due until 2026. A mammogram will be ordered as it is due in February 2025.         Preventative:  BREAST HEALTH- Monthly self breast exam importance and how to reviewed. MMG and/or MRI (prn) reviewed per society guidelines and her individual history. Screen: Pt will call to schedule  CERVICAL CANCER Screening- Reviewed current ASCCP guidelines for screening w and wo cotest HPV, age specific.  Screen: Already up to date  COLON CANCER Screening- Reviewed current medical society guidelines and options.  Screen:  Already up to date  Follow up PCP/Specialist PMHx and/or Labs      She understands the importance of having any ordered tests to be performed in a timely fashion.  The risks of not performing them include, but are not limited to, advanced cancer stages, bone loss from osteoporosis and/or subsequent increase in morbidity and/or mortality.  She is encouraged to review  her results online and/or contact or office if she has questions.     Follow Up:  Return in 1 year (on 12/2/2025) for will call patient with results of pelvic ultrasound.            Gianna Weber MD  12/02/2024    [facility]  [facility] [place] [address]  Dept: [phone]  Dept Fax: [phone]  Loc: [phone]  Loc Fax: [phone]     Patient or patient representative verbalized consent for the use of Ambient Listening during the visit with  Gianna Weber MD for chart documentation. 12/2/2024  10:36 EST

## 2024-12-02 ENCOUNTER — OFFICE VISIT (OUTPATIENT)
Dept: OBSTETRICS AND GYNECOLOGY | Facility: CLINIC | Age: 59
End: 2024-12-02
Payer: OTHER GOVERNMENT

## 2024-12-02 VITALS
SYSTOLIC BLOOD PRESSURE: 127 MMHG | HEIGHT: 62 IN | HEART RATE: 83 BPM | DIASTOLIC BLOOD PRESSURE: 77 MMHG | WEIGHT: 225 LBS | BODY MASS INDEX: 41.41 KG/M2

## 2024-12-02 DIAGNOSIS — Z12.31 ENCOUNTER FOR SCREENING MAMMOGRAM FOR MALIGNANT NEOPLASM OF BREAST: ICD-10-CM

## 2024-12-02 DIAGNOSIS — N95.8 GENITOURINARY SYNDROME OF MENOPAUSE: ICD-10-CM

## 2024-12-02 DIAGNOSIS — B37.31 VULVOVAGINITIS DUE TO YEAST: ICD-10-CM

## 2024-12-02 DIAGNOSIS — N90.4 LICHEN SCLEROSUS OF FEMALE GENITALIA: ICD-10-CM

## 2024-12-02 DIAGNOSIS — Z01.419 WELL WOMAN EXAM WITH ROUTINE GYNECOLOGICAL EXAM: Primary | ICD-10-CM

## 2024-12-02 DIAGNOSIS — R10.2 PELVIC PAIN: ICD-10-CM

## 2024-12-02 PROCEDURE — 99396 PREV VISIT EST AGE 40-64: CPT | Performed by: OBSTETRICS & GYNECOLOGY

## 2024-12-02 PROCEDURE — 99213 OFFICE O/P EST LOW 20 MIN: CPT | Performed by: OBSTETRICS & GYNECOLOGY

## 2024-12-02 RX ORDER — CLOTRIMAZOLE AND BETAMETHASONE DIPROPIONATE 10; .64 MG/G; MG/G
1 CREAM TOPICAL 2 TIMES DAILY
Qty: 45 G | Refills: 1 | Status: SHIPPED | OUTPATIENT
Start: 2024-12-02

## 2024-12-02 RX ORDER — CLOBETASOL PROPIONATE 0.5 MG/G
OINTMENT TOPICAL
Qty: 60 G | Refills: 3 | Status: SHIPPED | OUTPATIENT
Start: 2024-12-02

## 2025-01-03 DIAGNOSIS — J01.20 ACUTE ETHMOIDAL SINUSITIS, RECURRENCE NOT SPECIFIED: ICD-10-CM

## 2025-01-03 RX ORDER — IBUPROFEN 800 MG/1
800 TABLET, FILM COATED ORAL EVERY 6 HOURS PRN
Qty: 90 TABLET | Refills: 1 | Status: SHIPPED | OUTPATIENT
Start: 2025-01-03

## 2025-01-22 ENCOUNTER — PATIENT MESSAGE (OUTPATIENT)
Dept: OBSTETRICS AND GYNECOLOGY | Facility: CLINIC | Age: 60
End: 2025-01-22

## 2025-01-28 RX ORDER — ATORVASTATIN CALCIUM 10 MG/1
10 TABLET, FILM COATED ORAL DAILY
Qty: 90 TABLET | Refills: 1 | Status: SHIPPED | OUTPATIENT
Start: 2025-01-28 | End: 2025-02-03 | Stop reason: SDUPTHER

## 2025-02-03 ENCOUNTER — TELEPHONE (OUTPATIENT)
Dept: OBSTETRICS AND GYNECOLOGY | Facility: CLINIC | Age: 60
End: 2025-02-03
Payer: OTHER GOVERNMENT

## 2025-02-03 ENCOUNTER — OFFICE VISIT (OUTPATIENT)
Dept: INTERNAL MEDICINE | Facility: CLINIC | Age: 60
End: 2025-02-03
Payer: OTHER GOVERNMENT

## 2025-02-03 VITALS
HEIGHT: 62 IN | SYSTOLIC BLOOD PRESSURE: 120 MMHG | WEIGHT: 238 LBS | BODY MASS INDEX: 43.79 KG/M2 | TEMPERATURE: 97.6 F | OXYGEN SATURATION: 96 % | HEART RATE: 95 BPM | DIASTOLIC BLOOD PRESSURE: 82 MMHG | RESPIRATION RATE: 16 BRPM

## 2025-02-03 DIAGNOSIS — Z00.00 ANNUAL PHYSICAL EXAM: Primary | ICD-10-CM

## 2025-02-03 DIAGNOSIS — R30.0 DYSURIA: ICD-10-CM

## 2025-02-03 DIAGNOSIS — E03.9 HYPOTHYROIDISM, UNSPECIFIED TYPE: ICD-10-CM

## 2025-02-03 DIAGNOSIS — E78.2 MIXED HYPERLIPIDEMIA: ICD-10-CM

## 2025-02-03 LAB
ALBUMIN SERPL-MCNC: 4 G/DL (ref 3.5–5.2)
ALBUMIN/GLOB SERPL: 1.2 G/DL
ALP SERPL-CCNC: 127 U/L (ref 39–117)
ALT SERPL W P-5'-P-CCNC: 32 U/L (ref 1–33)
ANION GAP SERPL CALCULATED.3IONS-SCNC: 12.3 MMOL/L (ref 5–15)
AST SERPL-CCNC: 27 U/L (ref 1–32)
BACTERIA UR QL AUTO: ABNORMAL /HPF
BASOPHILS # BLD AUTO: 0.06 10*3/MM3 (ref 0–0.2)
BASOPHILS NFR BLD AUTO: 0.8 % (ref 0–1.5)
BILIRUB SERPL-MCNC: 0.2 MG/DL (ref 0–1.2)
BILIRUB UR QL STRIP: NEGATIVE
BUN SERPL-MCNC: 15 MG/DL (ref 8–23)
BUN/CREAT SERPL: 20.5 (ref 7–25)
CALCIUM SPEC-SCNC: 9.4 MG/DL (ref 8.6–10.5)
CHLORIDE SERPL-SCNC: 102 MMOL/L (ref 98–107)
CHOLEST SERPL-MCNC: 221 MG/DL (ref 0–200)
CLARITY UR: ABNORMAL
CO2 SERPL-SCNC: 23.7 MMOL/L (ref 22–29)
COLOR UR: YELLOW
CREAT SERPL-MCNC: 0.73 MG/DL (ref 0.57–1)
DEPRECATED RDW RBC AUTO: 41.1 FL (ref 37–54)
EGFRCR SERPLBLD CKD-EPI 2021: 94.3 ML/MIN/1.73
EOSINOPHIL # BLD AUTO: 0.19 10*3/MM3 (ref 0–0.4)
EOSINOPHIL NFR BLD AUTO: 2.6 % (ref 0.3–6.2)
ERYTHROCYTE [DISTWIDTH] IN BLOOD BY AUTOMATED COUNT: 13.5 % (ref 12.3–15.4)
GLOBULIN UR ELPH-MCNC: 3.3 GM/DL
GLUCOSE SERPL-MCNC: 93 MG/DL (ref 65–99)
GLUCOSE UR STRIP-MCNC: NEGATIVE MG/DL
HCT VFR BLD AUTO: 41.5 % (ref 34–46.6)
HDLC SERPL-MCNC: 46 MG/DL (ref 40–60)
HGB BLD-MCNC: 13.1 G/DL (ref 12–15.9)
HGB UR QL STRIP.AUTO: ABNORMAL
HYALINE CASTS UR QL AUTO: ABNORMAL /LPF
IMM GRANULOCYTES # BLD AUTO: 0.02 10*3/MM3 (ref 0–0.05)
IMM GRANULOCYTES NFR BLD AUTO: 0.3 % (ref 0–0.5)
KETONES UR QL STRIP: NEGATIVE
LDLC SERPL CALC-MCNC: 127 MG/DL (ref 0–100)
LDLC/HDLC SERPL: 2.62 {RATIO}
LEUKOCYTE ESTERASE UR QL STRIP.AUTO: ABNORMAL
LYMPHOCYTES # BLD AUTO: 2.17 10*3/MM3 (ref 0.7–3.1)
LYMPHOCYTES NFR BLD AUTO: 30.3 % (ref 19.6–45.3)
MCH RBC QN AUTO: 26.4 PG (ref 26.6–33)
MCHC RBC AUTO-ENTMCNC: 31.6 G/DL (ref 31.5–35.7)
MCV RBC AUTO: 83.7 FL (ref 79–97)
MONOCYTES # BLD AUTO: 0.44 10*3/MM3 (ref 0.1–0.9)
MONOCYTES NFR BLD AUTO: 6.1 % (ref 5–12)
NEUTROPHILS NFR BLD AUTO: 4.29 10*3/MM3 (ref 1.7–7)
NEUTROPHILS NFR BLD AUTO: 59.9 % (ref 42.7–76)
NITRITE UR QL STRIP: NEGATIVE
NRBC BLD AUTO-RTO: 0 /100 WBC (ref 0–0.2)
PH UR STRIP.AUTO: 5.5 [PH] (ref 5–8)
PLATELET # BLD AUTO: 275 10*3/MM3 (ref 140–450)
PMV BLD AUTO: 9.5 FL (ref 6–12)
POTASSIUM SERPL-SCNC: 4.5 MMOL/L (ref 3.5–5.2)
PROT SERPL-MCNC: 7.3 G/DL (ref 6–8.5)
PROT UR QL STRIP: NEGATIVE
RBC # BLD AUTO: 4.96 10*6/MM3 (ref 3.77–5.28)
RBC # UR STRIP: ABNORMAL /HPF
REF LAB TEST METHOD: ABNORMAL
SODIUM SERPL-SCNC: 138 MMOL/L (ref 136–145)
SP GR UR STRIP: 1.02 (ref 1–1.03)
SQUAMOUS #/AREA URNS HPF: ABNORMAL /HPF
T4 FREE SERPL-MCNC: 0.85 NG/DL (ref 0.92–1.68)
TRIGL SERPL-MCNC: 273 MG/DL (ref 0–150)
TSH SERPL DL<=0.05 MIU/L-ACNC: 3.15 UIU/ML (ref 0.27–4.2)
UROBILINOGEN UR QL STRIP: ABNORMAL
VLDLC SERPL-MCNC: 48 MG/DL (ref 5–40)
WBC # UR STRIP: ABNORMAL /HPF
WBC NRBC COR # BLD AUTO: 7.17 10*3/MM3 (ref 3.4–10.8)

## 2025-02-03 PROCEDURE — 84443 ASSAY THYROID STIM HORMONE: CPT | Performed by: PHYSICIAN ASSISTANT

## 2025-02-03 PROCEDURE — 99214 OFFICE O/P EST MOD 30 MIN: CPT | Performed by: PHYSICIAN ASSISTANT

## 2025-02-03 PROCEDURE — 85025 COMPLETE CBC W/AUTO DIFF WBC: CPT | Performed by: PHYSICIAN ASSISTANT

## 2025-02-03 PROCEDURE — 99396 PREV VISIT EST AGE 40-64: CPT | Performed by: PHYSICIAN ASSISTANT

## 2025-02-03 PROCEDURE — 84439 ASSAY OF FREE THYROXINE: CPT | Performed by: PHYSICIAN ASSISTANT

## 2025-02-03 PROCEDURE — 87186 SC STD MICRODIL/AGAR DIL: CPT | Performed by: PHYSICIAN ASSISTANT

## 2025-02-03 PROCEDURE — 81001 URINALYSIS AUTO W/SCOPE: CPT | Performed by: PHYSICIAN ASSISTANT

## 2025-02-03 PROCEDURE — 87086 URINE CULTURE/COLONY COUNT: CPT | Performed by: PHYSICIAN ASSISTANT

## 2025-02-03 PROCEDURE — 80061 LIPID PANEL: CPT | Performed by: PHYSICIAN ASSISTANT

## 2025-02-03 PROCEDURE — 87088 URINE BACTERIA CULTURE: CPT | Performed by: PHYSICIAN ASSISTANT

## 2025-02-03 PROCEDURE — 80053 COMPREHEN METABOLIC PANEL: CPT | Performed by: PHYSICIAN ASSISTANT

## 2025-02-03 RX ORDER — ATORVASTATIN CALCIUM 10 MG/1
5 TABLET, FILM COATED ORAL DAILY
Qty: 45 TABLET | Refills: 1 | Status: SHIPPED | OUTPATIENT
Start: 2025-02-03

## 2025-02-03 RX ORDER — CETIRIZINE HYDROCHLORIDE 10 MG/1
10 TABLET ORAL DAILY
Qty: 90 TABLET | Refills: 1 | Status: SHIPPED | OUTPATIENT
Start: 2025-02-03

## 2025-02-03 RX ORDER — LEVOTHYROXINE SODIUM 25 UG/1
25 TABLET ORAL
Qty: 90 TABLET | Refills: 1 | Status: SHIPPED | OUTPATIENT
Start: 2025-02-03

## 2025-02-03 RX ORDER — CITALOPRAM HYDROBROMIDE 20 MG/1
20 TABLET ORAL NIGHTLY
Qty: 90 TABLET | Refills: 1 | Status: SHIPPED | OUTPATIENT
Start: 2025-02-03

## 2025-02-03 NOTE — ASSESSMENT & PLAN NOTE
Reviewed preventative medication recommendations that are age appropriate for the patient. Education provided for health and wellness. Encouraged healthy diet, regular exercise, and routine wellness checkups.  Encourage patient to get shingles vaccine, she will get from the pharmacy.   Will request tetanus record from [facility].
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
## 2025-02-03 NOTE — TELEPHONE ENCOUNTER
Patient called wanting to get recommendations on the ultrasound she had 12/2/24 at [facility]. Result has been bookmarked under the media tab for review. Please advise.

## 2025-02-05 LAB — BACTERIA SPEC AEROBE CULT: ABNORMAL

## 2025-02-05 RX ORDER — NITROFURANTOIN 25; 75 MG/1; MG/1
100 CAPSULE ORAL 2 TIMES DAILY
Qty: 14 CAPSULE | Refills: 0 | Status: SHIPPED | OUTPATIENT
Start: 2025-02-05 | End: 2025-02-12

## 2025-02-23 DIAGNOSIS — J01.20 ACUTE ETHMOIDAL SINUSITIS, RECURRENCE NOT SPECIFIED: ICD-10-CM

## 2025-02-24 ENCOUNTER — PATIENT MESSAGE (OUTPATIENT)
Dept: OBSTETRICS AND GYNECOLOGY | Facility: CLINIC | Age: 60
End: 2025-02-24
Payer: OTHER GOVERNMENT

## 2025-02-24 RX ORDER — IBUPROFEN 800 MG/1
800 TABLET, FILM COATED ORAL EVERY 6 HOURS PRN
Qty: 90 TABLET | Refills: 1 | Status: SHIPPED | OUTPATIENT
Start: 2025-02-24

## 2025-03-05 ENCOUNTER — TELEPHONE (OUTPATIENT)
Dept: OBSTETRICS AND GYNECOLOGY | Facility: CLINIC | Age: 60
End: 2025-03-05
Payer: OTHER GOVERNMENT

## 2025-03-05 NOTE — TELEPHONE ENCOUNTER
----- Message from Gianna Weber sent at 2/26/2025  1:14 PM EST -----  Please call pt and inform her of normal results on her pelvic ultrasound

## 2025-03-05 NOTE — TELEPHONE ENCOUNTER
Spoke with Patient letting her know her ultrasound showed normal results. Patient voiced understanding.

## 2025-03-06 ENCOUNTER — HOSPITAL ENCOUNTER (OUTPATIENT)
Dept: MAMMOGRAPHY | Facility: HOSPITAL | Age: 60
Discharge: HOME | End: 2025-03-06
Admitting: OBSTETRICS & GYNECOLOGY
Payer: OTHER GOVERNMENT

## 2025-03-06 DIAGNOSIS — Z12.31 ENCOUNTER FOR SCREENING MAMMOGRAM FOR MALIGNANT NEOPLASM OF BREAST: ICD-10-CM

## 2025-03-06 PROCEDURE — 77063 BREAST TOMOSYNTHESIS BI: CPT

## 2025-03-06 PROCEDURE — 77067 SCR MAMMO BI INCL CAD: CPT

## 2025-03-11 ENCOUNTER — TELEPHONE (OUTPATIENT)
Dept: INTERNAL MEDICINE | Facility: CLINIC | Age: 60
End: 2025-03-11
Payer: OTHER GOVERNMENT

## 2025-03-11 NOTE — TELEPHONE ENCOUNTER
Patient called in stating that she had torn her meniscus, advised patient that we do not have any openings for today. Patient was advised to be seen in the ER or UC if pain worsens. Patient scheduled to see Barb Matos PA-C on 3/17/2025

## 2025-03-17 ENCOUNTER — OFFICE VISIT (OUTPATIENT)
Dept: INTERNAL MEDICINE | Facility: CLINIC | Age: 60
End: 2025-03-17
Payer: OTHER GOVERNMENT

## 2025-03-17 VITALS
HEART RATE: 80 BPM | SYSTOLIC BLOOD PRESSURE: 140 MMHG | DIASTOLIC BLOOD PRESSURE: 82 MMHG | RESPIRATION RATE: 16 BRPM | OXYGEN SATURATION: 98 % | WEIGHT: 241.4 LBS | BODY MASS INDEX: 44.42 KG/M2 | TEMPERATURE: 97.5 F | HEIGHT: 62 IN

## 2025-03-17 DIAGNOSIS — M25.561 RIGHT KNEE PAIN, UNSPECIFIED CHRONICITY: Primary | ICD-10-CM

## 2025-03-17 DIAGNOSIS — S89.91XA INJURY OF RIGHT KNEE, INITIAL ENCOUNTER: ICD-10-CM

## 2025-03-17 NOTE — PROGRESS NOTES
"Chief Complaint  Post-op Knee (Torn Meniscus, on left knee. Same pain in Right knee that happened in left knee. Possible referral for Dr. Acevedo. )    Subjective          Soheila Griffin presents to [facility]    Right knee pain- symptoms began about a month ago with a \"popping sensation\" when she squatted down.  She has had constant pain since then.  It is worse when she is weight bearing.  She denies instability of the joint but the pain is what is worse at this time.  Patient states that she has had left meniscus repair after tearing it and is concerned this may be her issue with the right knee as well.  Patient is scheduled to see Dr. Acevedo next month to follow up on left knee. She has taken some ibuprofen.     Objective   Vital Signs:   /82 (BP Location: Left arm, Patient Position: Sitting, Cuff Size: Large Adult)   Pulse 80   Temp 97.5 °F (36.4 °C) (Temporal)   Resp 16   Ht 157.5 cm (62.01\")   Wt 109 kg (241 lb 6.4 oz)   SpO2 98%   BMI 44.14 kg/m²     Physical Exam  Constitutional:       Appearance: Normal appearance.   HENT:      Head: Normocephalic and atraumatic.   Eyes:      Extraocular Movements: Extraocular movements intact.      Conjunctiva/sclera: Conjunctivae normal.      Pupils: Pupils are equal, round, and reactive to light.   Cardiovascular:      Rate and Rhythm: Normal rate and regular rhythm.      Pulses: Normal pulses.      Heart sounds: Normal heart sounds.   Pulmonary:      Effort: Pulmonary effort is normal. No respiratory distress.      Breath sounds: Normal breath sounds.   Musculoskeletal:         General: Tenderness (right knee joint line tenderness, Medial > lateral) present. No swelling or deformity. Normal range of motion.      Cervical back: Normal range of motion and neck supple. No rigidity.      Right lower leg: No edema.      Left lower leg: No edema.   Skin:     General: Skin is warm and dry.      " Coloration: Skin is not pale.   Neurological:      General: No focal deficit present.      Mental Status: She is alert and oriented to person, place, and time. Mental status is at baseline.      Gait: Gait normal.   Psychiatric:         Mood and Affect: Mood normal.         Behavior: Behavior normal.        Result Review :          Procedures      Assessment and Plan    Diagnoses and all orders for this visit:    1. Right knee pain, unspecified chronicity (Primary)  Assessment & Plan:  Will get xray in office and send patient to ortho.  Patient with prior left knee meniscus tear and total knee replacement, therefore will go ahead and order MRI for further evaluation.    Orders:  -     XR Knee 3 View Right  -     Cancel: Ambulatory Referral to Orthopedic Surgery  -     MRI Knee Right Without Contrast; Future  -     Ambulatory Referral to Orthopedic Surgery    2. Injury of right knee, initial encounter  -     XR Knee 3 View Right  -     Cancel: Ambulatory Referral to Orthopedic Surgery  -     MRI Knee Right Without Contrast; Future  -     Ambulatory Referral to Orthopedic Surgery              Follow Up   No follow-ups on file.  Patient was given instructions and counseling regarding her condition or for health maintenance advice. Please see specific information pulled into the AVS if appropriate.

## 2025-03-17 NOTE — ASSESSMENT & PLAN NOTE
Will get xray in office and send patient to ortho.  Patient with prior left knee meniscus tear and total knee replacement, therefore will go ahead and order MRI for further evaluation.

## 2025-03-27 ENCOUNTER — OFFICE VISIT (OUTPATIENT)
Dept: ORTHOPEDIC SURGERY | Facility: CLINIC | Age: 60
End: 2025-03-27
Payer: OTHER GOVERNMENT

## 2025-03-27 VITALS
SYSTOLIC BLOOD PRESSURE: 121 MMHG | DIASTOLIC BLOOD PRESSURE: 82 MMHG | BODY MASS INDEX: 42.69 KG/M2 | OXYGEN SATURATION: 95 % | HEIGHT: 62 IN | WEIGHT: 232 LBS | HEART RATE: 94 BPM

## 2025-03-27 DIAGNOSIS — M17.11 PRIMARY OSTEOARTHRITIS OF RIGHT KNEE: Primary | ICD-10-CM

## 2025-03-27 DIAGNOSIS — E66.01 OBESITY, MORBID, BMI 40.0-49.9: ICD-10-CM

## 2025-03-27 RX ADMIN — LIDOCAINE HYDROCHLORIDE 5 ML: 10 INJECTION, SOLUTION INFILTRATION; PERINEURAL at 10:40

## 2025-03-27 RX ADMIN — TRIAMCINOLONE ACETONIDE 40 MG: 40 INJECTION, SUSPENSION INTRA-ARTICULAR; INTRAMUSCULAR at 10:40

## 2025-03-27 NOTE — PROGRESS NOTES
"Chief Complaint  Initial Evaluation of the Right Knee       Subjective      Soheila Griffin presents to [facility] for an evaluation  of her right knee. She was picking up something when she felt a pop to her knee. She locates her pain to the anterior  lateral  knee. She has pain with prolonged walking, going up and down stairs and at nighttime. She has had a left knee replaced in the past and is overall doing well with this knee. She has been taking Ibuprofen with little relief.     Allergies   Allergen Reactions    Amoxicillin Rash     CHILDHOOD RX    Diclofenac Nausea Only and Dizziness    Metal [Nickel] Itching, Swelling and Rash     CHEAP JEWELRY     Oxycodone-Acetaminophen Palpitations    Sulfamethoxazole-Trimethoprim Photosensitivity        Social History     Socioeconomic History    Marital status:    Tobacco Use    Smoking status: Former     Current packs/day: 0.00     Average packs/day: 0.3 packs/day for 3.3 years (0.8 ttl pk-yrs)     Types: Cigarettes     Start date: 1986     Quit date:      Years since quittin.2    Smokeless tobacco: Never   Vaping Use    Vaping status: Never Used   Substance and Sexual Activity    Alcohol use: Not Currently    Drug use: Never    Sexual activity: Yes     Partners: Male     Birth control/protection: Vasectomy        I reviewed the patient's chief complaint, history of present illness, review of systems, past medical history, surgical history, family history, social history, medications, and allergy list.     Review of Systems     Constitutional: Denies fevers, chills, weight loss  Cardiovascular: Denies chest pain, shortness of breath  Skin: Denies rashes, acute skin changes  Neurologic: Denies headache, loss of consciousness  MSK: right knee pain       Vital Signs:   /82   Pulse 94   Ht 157.5 cm (62.01\")   Wt 105 kg (232 lb)   SpO2 95%   BMI 42.42 kg/m²            Ortho Exam    Physical Exam  General:Alert. " No acute distress     Right lower extremity: full extension, flexion  to 130 degrees, stable to varus/valgus stress, stable to anterior/posterior drawer, negative  Lachman's, pain with Sona's, tender to the medial joint line , non tender to the lateral joint line, calf soft, distal neurovascularly intact, positive  pulses, positive EHL, FHL, GS, and TA. Sensation intact to all 5 nerves of the foot.     Large Joint: R knee  Date/Time: 3/27/2025 10:40 AM  Consent given by: patient  Site marked: site marked  Timeout: Immediately prior to procedure a time out was called to verify the correct patient, procedure, equipment, support staff and site/side marked as required   Supporting Documentation  Indications: pain   Procedure Details  Location: knee - R knee  Preparation: Patient was prepped and draped in the usual sterile fashion  Needle gauge: 21 G.  Medications administered: 40 mg triamcinolone acetonide 40 MG/ML; 5 mL lidocaine 1 %  Patient tolerance: patient tolerated the procedure well with no immediate complications    This injection documentation was Scribed for Hardeep Acevedo MD by Jesusita Hernandez MA.  03/27/25   10:41 EDT   Imaging Results (Most Recent)       None             Result Review :       XR Knee 3 View Right  Result Date: 3/17/2025  Narrative: XR KNEE 3 VW RIGHT Date of Exam: 3/17/2025 12:02 PM EDT Indication: right knee pain, injury Comparison: None available. Findings: There is soft tissue swelling anteriorly over the tibial tuberosity. There is no acute fracture or dislocation. No joint effusion. There is degenerative spurring of the patella. There is degenerative spurring and osteophyte formation of the medial femoral and tibial condyles. There is mild narrowing of the knee joint space.     Impression: Impression: 1. Mild to moderate degenerative change of the right knee. No acute bony abnormality or joint effusion. 2. Soft tissue swelling overlying the tibial tuberosity. Electronically  Signed: Prosper Le MD  3/17/2025 12:18 PM EDT  Workstation ID: [id number]    Mammo Screening Digital Tomosynthesis Bilateral With CAD  Result Date: 3/6/2025  Narrative: MAMMO SCREENING DIGITAL TOMOSYNTHESIS BILATERAL W CAD-  Date of Exam: 3/6/2025 10:48 AM  Indication: Screening.  Comparison: Numerous mammograms between March 5, 2024 and August 19, 2015  Technique: Routine screening mammogram images were obtained per protocol.  Tomosynthesis was utilized.  These mammographic images were interpreted with the assistance of a computer aided detection system.  Breast Density: There are scattered areas of fibroglandular density.  Findings: No suspicious masses, suspicious microcalcifications, or architectural distortions are identified.      Impression: No mammographic evidence of malignancy.  Recommend annual screening mammography.  BI-RADS ASSESSMENT: Category 1: Negative  Note: It has been reported that there is approximately a 15% false negative rate in mammography.  Therefore, management of a palpable abnormality should not be deferred because of a negative mammogram.  3/6/2025 2:26 PM by Lei Weber on Workstation: [id number]               Assessment and Plan     Diagnoses and all orders for this visit:    1. Primary osteoarthritis of right knee (Primary)    2. Obesity, morbid, BMI 40.0-49.9        The patient presents here today for an evaluation  of her right knee.      Discussed operative treatment options regarding a right knee arthroplasty with brenda versus non operative treatment options regarding injections, oral and topical medications and therapy.     Patient wishes to proceed with conservative treatment options.     Discussed the risks and benefits of a right knee steroid injection today in the office. Patient expressed understanding and wishes to proceed.  Patient tolerted the injection well and without any complications.     She will continue ibuprofen and home exercises given today.     Educated on  risk of elevated BMI.  Discussed options for weight loss/decreasing BMI prior to procedure including dietician consult, weight loss options and exercise program. and Call or return if worsening symptoms.    Follow Up     3 months     Will obtain X-Rays of right knee at next visit.       Patient was given instructions and counseling regarding her condition or for health maintenance advice. Please see specific information pulled into the AVS if appropriate.     Scribed for Hardeep Acevedo MD by Neva Ayala.  03/27/25   10:01 EDT    I have personally performed the services described in this document as scribed by the above individual and it is both accurate and complete. Hardeep Acevedo MD 03/28/25

## 2025-03-28 RX ORDER — TRIAMCINOLONE ACETONIDE 40 MG/ML
40 INJECTION, SUSPENSION INTRA-ARTICULAR; INTRAMUSCULAR
Status: COMPLETED | OUTPATIENT
Start: 2025-03-27 | End: 2025-03-27

## 2025-03-28 RX ORDER — LIDOCAINE HYDROCHLORIDE 10 MG/ML
5 INJECTION, SOLUTION INFILTRATION; PERINEURAL
Status: COMPLETED | OUTPATIENT
Start: 2025-03-27 | End: 2025-03-27

## 2025-04-22 ENCOUNTER — HOSPITAL ENCOUNTER (OUTPATIENT)
Dept: MRI IMAGING | Facility: HOSPITAL | Age: 60
Discharge: HOME | End: 2025-04-22
Admitting: PHYSICIAN ASSISTANT
Payer: OTHER GOVERNMENT

## 2025-04-22 DIAGNOSIS — M25.561 RIGHT KNEE PAIN, UNSPECIFIED CHRONICITY: ICD-10-CM

## 2025-04-22 DIAGNOSIS — S89.91XA INJURY OF RIGHT KNEE, INITIAL ENCOUNTER: ICD-10-CM

## 2025-04-22 PROCEDURE — 73721 MRI JNT OF LWR EXTRE W/O DYE: CPT

## 2025-04-29 ENCOUNTER — OFFICE VISIT (OUTPATIENT)
Dept: ORTHOPEDIC SURGERY | Facility: CLINIC | Age: 60
End: 2025-04-29
Payer: OTHER GOVERNMENT

## 2025-04-29 VITALS
SYSTOLIC BLOOD PRESSURE: 147 MMHG | HEART RATE: 77 BPM | BODY MASS INDEX: 41.22 KG/M2 | DIASTOLIC BLOOD PRESSURE: 85 MMHG | HEIGHT: 62 IN | WEIGHT: 224 LBS | OXYGEN SATURATION: 96 %

## 2025-04-29 DIAGNOSIS — Z96.652 AFTERCARE FOLLOWING LEFT KNEE JOINT REPLACEMENT SURGERY: Primary | ICD-10-CM

## 2025-04-29 DIAGNOSIS — M25.562 LEFT KNEE PAIN, UNSPECIFIED CHRONICITY: ICD-10-CM

## 2025-04-29 DIAGNOSIS — Z47.1 AFTERCARE FOLLOWING LEFT KNEE JOINT REPLACEMENT SURGERY: Primary | ICD-10-CM

## 2025-04-29 PROCEDURE — 99213 OFFICE O/P EST LOW 20 MIN: CPT | Performed by: PHYSICIAN ASSISTANT

## 2025-04-29 NOTE — PROGRESS NOTES
"Chief Complaint  Follow-up of the Left Knee    Subjective          History of Present Illness      Soheila Griffin is a 59 y.o. female  presents to [facility] for     Patient presents for follow-up evaluation of left total knee arthroplasty, 2024.  Patient states her knee is \"doing great \".  She is very active she recently went to [employer] states she walked a lot and states that her knee was able to tolerate her activities well she denies pain to the knee she states she is able to walk up and down stairs without trouble no new complaints of the left knee.  She states her right knee has been hurting her and she is seeing Dr. Acevedo for this he is evaluating her and caring for her right knee she states her right knee is worse than her left      Allergies   Allergen Reactions    Amoxicillin Rash     CHILDHOOD RX    Diclofenac Nausea Only and Dizziness    Metal [Nickel] Itching, Swelling and Rash     CHEAP JEWELRY     Oxycodone-Acetaminophen Palpitations    Sulfamethoxazole-Trimethoprim Photosensitivity        Social History     Socioeconomic History    Marital status:    Tobacco Use    Smoking status: Former     Current packs/day: 0.00     Average packs/day: 0.3 packs/day for 3.3 years (0.8 ttl pk-yrs)     Types: Cigarettes     Start date: 1986     Quit date:      Years since quittin.3    Smokeless tobacco: Never   Vaping Use    Vaping status: Never Used   Substance and Sexual Activity    Alcohol use: Not Currently    Drug use: Never    Sexual activity: Yes     Partners: Male     Birth control/protection: Vasectomy        REVIEW OF SYSTEMS    Constitutional: Awake alert and oriented x3, no acute distress, denies fevers, chills, weight loss  Respiratory: No respiratory distress  Vascular: Brisk cap refill, Intact distal pulses, No cyanosis, compartments soft with no signs or symptoms of compartment syndrome or DVT.   Cardiovascular: Denies chest pain, " "shortness of breath  Skin: Denies rashes, acute skin changes  Neurologic: Denies headache, loss of consciousness  MSK: Left knee pain      Objective   Vital Signs:   /85   Pulse 77   Ht 157.5 cm (62.01\")   Wt 102 kg (224 lb)   SpO2 96%   BMI 40.96 kg/m²     Body mass index is 40.96 kg/m².    Physical Exam       Left knee: Well-healed incision, no erythema, no ecchymosis, no swelling, no effusion, no signs of infection, full extension, flexion 115, stable anterior/posterior drawer, stable to varus/valgus stress.  Nontender to palpation, no pain with range of motion.  5 out of 5 strength, no pain with resisted range of motion nontender calf, negative Tyler testing      Procedures    Imaging Results (Most Recent)       Procedure Component Value Units Date/Time    XR Knee 3 View Left [[id number]] Resulted: 04/29/25 1023     Updated: 04/29/25 1023    Narrative:      View:AP/Lateral and Sunrise view(s)  Site: Left knee  Indication: Left knee pain  Study: X-rays ordered, taken in the office, and reviewed today  X-ray: Intact appearing left total knee arthroplasty, no signs of hardware   failure or loosening, no subsidence or periprosthetic fracture, good   alignment  Comparative data: Previous studies                   Assessment and Plan    Diagnoses and all orders for this visit:    1. Aftercare following left total knee arthroplasty, 4/24/2024 (Primary)    2. Left knee pain, unspecified chronicity  -     XR Knee 3 View Left        Reviewed x-rays with the patient, discussed diagnosis and treatment options with her, she will continue activity and weightbearing as tolerated follow-up as needed for the left knee.    Call or return if worsening symptoms.    Follow Up   Return if symptoms worsen or fail to improve.  Patient was given instructions and counseling regarding her condition or for health maintenance advice. Please see specific information pulled into the AVS if appropriate.       EMR Dragon/Transcription " disclaimer:  Part of this note may be an electronic transcription/translation of spoken language to printed text using the Dragon Dictation System

## 2025-06-26 ENCOUNTER — OFFICE VISIT (OUTPATIENT)
Dept: ORTHOPEDIC SURGERY | Facility: CLINIC | Age: 60
End: 2025-06-26
Payer: OTHER GOVERNMENT

## 2025-06-26 VITALS
DIASTOLIC BLOOD PRESSURE: 78 MMHG | OXYGEN SATURATION: 96 % | HEART RATE: 75 BPM | BODY MASS INDEX: 41.22 KG/M2 | HEIGHT: 62 IN | SYSTOLIC BLOOD PRESSURE: 139 MMHG | WEIGHT: 224 LBS

## 2025-06-26 DIAGNOSIS — M25.561 RIGHT KNEE PAIN, UNSPECIFIED CHRONICITY: Primary | ICD-10-CM

## 2025-06-26 DIAGNOSIS — M17.11 PRIMARY OSTEOARTHRITIS OF RIGHT KNEE: ICD-10-CM

## 2025-06-26 DIAGNOSIS — E66.01 OBESITY, MORBID, BMI 40.0-49.9: ICD-10-CM

## 2025-06-26 RX ORDER — LIDOCAINE HYDROCHLORIDE 10 MG/ML
5 INJECTION, SOLUTION INFILTRATION; PERINEURAL
Status: COMPLETED | OUTPATIENT
Start: 2025-06-26 | End: 2025-06-26

## 2025-06-26 RX ORDER — TRIAMCINOLONE ACETONIDE 40 MG/ML
40 INJECTION, SUSPENSION INTRA-ARTICULAR; INTRAMUSCULAR
Status: COMPLETED | OUTPATIENT
Start: 2025-06-26 | End: 2025-06-26

## 2025-06-26 RX ORDER — CYCLOBENZAPRINE HCL 5 MG
5 TABLET ORAL 3 TIMES DAILY PRN
COMMUNITY

## 2025-06-26 RX ADMIN — LIDOCAINE HYDROCHLORIDE 5 ML: 10 INJECTION, SOLUTION INFILTRATION; PERINEURAL at 10:45

## 2025-06-26 RX ADMIN — TRIAMCINOLONE ACETONIDE 40 MG: 40 INJECTION, SUSPENSION INTRA-ARTICULAR; INTRAMUSCULAR at 10:45

## 2025-06-26 NOTE — PROGRESS NOTES
"Chief Complaint  Follow-up of the Right Knee       Subjective      Soheila Griffin presents to [facility] for a follow up for her right knee. She has been treating her right knee osteoarthritis conservatively. She was last seen in the office on 25 where she had a right knee steroid injection. She states this injection took a little bit to work but didn't last as long as she would like. She would like to proceed with operative treatment options in the fall. She denies any new injury or falls since her right knee.     Allergies   Allergen Reactions    Amoxicillin Rash     CHILDHOOD RX    Diclofenac Nausea Only and Dizziness    Metal [Nickel] Itching, Swelling and Rash     CHEAP JEWELRY     Oxycodone-Acetaminophen Palpitations    Sulfamethoxazole-Trimethoprim Photosensitivity        Social History     Socioeconomic History    Marital status:    Tobacco Use    Smoking status: Former     Current packs/day: 0.00     Average packs/day: 0.3 packs/day for 3.3 years (0.8 ttl pk-yrs)     Types: Cigarettes     Start date: 1986     Quit date:      Years since quittin.5    Smokeless tobacco: Never   Vaping Use    Vaping status: Never Used   Substance and Sexual Activity    Alcohol use: Not Currently    Drug use: Never    Sexual activity: Yes     Partners: Male     Birth control/protection: Vasectomy        I reviewed the patient's chief complaint, history of present illness, review of systems, past medical history, surgical history, family history, social history, medications, and allergy list.     Review of Systems     Constitutional: Denies fevers, chills, weight loss  Cardiovascular: Denies chest pain, shortness of breath  Skin: Denies rashes, acute skin changes  Neurologic: Denies headache, loss of consciousness  MSK: right knee pain       Vital Signs:   /78   Pulse 75   Ht 157.5 cm (62.01\")   Wt 102 kg (224 lb)   SpO2 96%   BMI 40.96 kg/m²            Ortho " Exam    Physical Exam  General:Alert. No acute distress   Right lower extremity: full extension, flexion to 130 degrees, stable to varus/valgus stress, stable to anterior/posterior drawer, negative Lachman's, pain with Sona's, tender to the medial joint line , non tender to the lateral joint line, calf soft, distal neurovascularly intact, positive pulses, positive EHL, FHL, GS, and TA. Sensation intact to all 5 nerves of the foot.     Large Joint: R knee  Date/Time: 6/26/2025 10:45 AM  Consent given by: patient  Site marked: site marked  Timeout: Immediately prior to procedure a time out was called to verify the correct patient, procedure, equipment, support staff and site/side marked as required   Supporting Documentation  Indications: pain   Procedure Details  Location: knee - R knee  Preparation: Patient was prepped and draped in the usual sterile fashion  Needle gauge: 21 G.  Medications administered: 5 mL lidocaine 1 %; 40 mg triamcinolone acetonide 40 MG/ML  Patient tolerance: patient tolerated the procedure well with no immediate complications    This injection documentation was Scribed for Hardeep Acevedo MD by Alba Kent MA.  06/26/25   10:45 EDT    X-Ray Report:  Right knee X-Ray  Indication: Evaluation of right knee pain   AP/Lateral and Standing view(s)  Findings: advanced degenerative changes to the right knee. Tricompartmental osteophyte, no acute fracture, near bone on bone to the medial  compartment   Prior studies available for comparison: yes       Imaging Results (Most Recent)       Procedure Component Value Units Date/Time    XR Knee 3 View Right - In process [[id number]] Resulted: 06/26/25 0937     Updated: 06/26/25 0942    This result has not been signed. Information might be incomplete.               Result Review :       No results found.           Assessment and Plan     Diagnoses and all orders for this visit:    1. Right knee pain, unspecified chronicity (Primary)  -     XR  Knee 3 View Right    2. Obesity, morbid, BMI 40.0-49.9    3. Primary osteoarthritis of right knee      The patient presents here today for a follow up for her right knee osteoarthritis. X-rays were obtained in the office today and these were reviewed today.     Discussed the risks and benefits of a right knee steroid injection today in the office. Patient expressed understanding and wishes to proceed. Patient tolerted the injection well and without any complications.     Home exercises given today and will continue current medications for pain control.     Educated on risk of elevated BMI.  Discussed options for weight loss/decreasing BMI prior to procedure including dietician consult, weight loss options and exercise program. and Call or return if worsening symptoms.    Follow Up     3 months     Patient was given instructions and counseling regarding her condition or for health maintenance advice. Please see specific information pulled into the AVS if appropriate.     Scribed for Hardeep Acevedo MD by Neva Ayala.  06/26/25   09:51 EDT    I have personally performed the services described in this document as scribed by the above individual and it is both accurate and complete. Hardeep Acevedo MD 06/26/25

## 2025-07-28 RX ORDER — ATORVASTATIN CALCIUM 10 MG/1
10 TABLET, FILM COATED ORAL DAILY
Qty: 90 TABLET | Refills: 1 | Status: SHIPPED | OUTPATIENT
Start: 2025-07-28

## (undated) DEVICE — TOTAL KNEE-LF: Brand: MEDLINE INDUSTRIES, INC.

## (undated) DEVICE — STERILE POLYISOPRENE POWDER-FREE SURGICAL GLOVES: Brand: PROTEXIS

## (undated) DEVICE — PROXIMATE RH ROTATING HEAD SKIN STAPLERS (35 WIDE) CONTAINS 35 STAINLESS STEEL STAPLES: Brand: PROXIMATE

## (undated) DEVICE — KNEE ARTHROSCOPY-LF: Brand: MEDLINE INDUSTRIES, INC.

## (undated) DEVICE — PULLOVER TOGA, 2X LARGE: Brand: FLYTE, SURGICOOL

## (undated) DEVICE — BNDG ESMARK STRL 6INX12FT LF

## (undated) DEVICE — DRESSING,GAUZE,XEROFORM,CURAD,1"X8",ST: Brand: CURAD

## (undated) DEVICE — APPL CHLORAPREP HI/LITE 26ML ORNG

## (undated) DEVICE — UNDERCAST PADDING: Brand: DEROYAL

## (undated) DEVICE — PENCL E/S HNDSWCH ROCKR CB

## (undated) DEVICE — DRP ROBOTIC ROSA BX/20

## (undated) DEVICE — INTENDED FOR TISSUE SEPARATION, AND OTHER PROCEDURES THAT REQUIRE A SHARP SURGICAL BLADE TO PUNCTURE OR CUT.: Brand: BARD-PARKER ® CARBON RIB-BACK BLADES

## (undated) DEVICE — SCRW HEX PERSONA FML 2.5X25MM PK/2
Type: IMPLANTABLE DEVICE | Site: KNEE | Status: NON-FUNCTIONAL
Removed: 2024-04-24

## (undated) DEVICE — DRSNG GZ PETROLTM XEROFORM CURAD 1X8IN STRL

## (undated) DEVICE — PEEL-AWAY TOGA, 2X LARGE: Brand: FLYTE

## (undated) DEVICE — ANTIBACTERIAL VIOLET BRAIDED (POLYGLACTIN 910), SYNTHETIC ABSORBABLE SURGICAL SUTURE: Brand: COATED VICRYL

## (undated) DEVICE — DRSNG SURESITE WNDW 4X4.5

## (undated) DEVICE — SYR LUERLOK 30CC

## (undated) DEVICE — STERILE POLYISOPRENE POWDER-FREE SURGICAL GLOVES WITH EMOLLIENT COATING: Brand: PROTEXIS

## (undated) DEVICE — GLV SURG SENSICARE PI ORTHO SZ8 LF STRL

## (undated) DEVICE — SOL IRR NACL 0.9PCT 3000ML

## (undated) DEVICE — CVR LEG BOOTLEG F/R NOSKID 33IN

## (undated) DEVICE — INTEGRATED CASSETTE TUBING, DO NOT USE IF PACKAGE IS DAMAGED: Brand: CROSSFLOW

## (undated) DEVICE — SLV SCD KN/LEN ADJ EXPRSS BLENDED MD 1P/U

## (undated) DEVICE — DRP SURG U/DRP INVISISHIELD PA 48X52IN

## (undated) DEVICE — NO-SCRATCH ™ SMALL WHITNEY CURETTE ™ IS A SINGLE-USE, PLASTIC CURETTE FOR QUICKLY APPLYING, MANIPULATING AND REMOVING BONE CEMENT DURING HIP AND KNEE REPLACEMENT SURGERY. THE PLASTIC IS SOFTER THAN STEEL INSTRUMENTS, REDUCING THE RISK OF DAMAGING THE PROSTHESIS WITH METAL INSTRUMENTS.  THE CURETTE’S 6MM TIP REMOVES EXCESS CEMENT FROM REPLACEMENT HIPS AND KNEES. EASY-TO-MANEUVER, THE SMALL BLUE CURETTE LETS YOU REMOVE CEMENT FROM ALL EDGES OF THE PROSTHESIS.NO-SCRATCH WHITNEY SMALL CURETTE FEATURES:SAFER THAN STEEL- MADE OF PLASTIC - STURDY YET SOFTER THAN SURGICAL STEEL.HANDIER- EACH TOOL HAS A MOLDED-IN THUMB INDENTATION INSTANTLY ORIENTING THE TOOL.- EASIER TO MANEUVER IN HARD TO SEE PLACES.- COLOR-CODED FOR EASY IDENTIFICATION.FASTER- COMES INDIVIDUALLY PACKAGED IN STERILE, PEEL OPEN POUCH, READY TO GO.- APPLIES, MANIPULATES, OR REMOVES CEMENT WITH FINGERTIP PRECISION.ECONOMICAL- THE COST OF A SINGLE REVISION DWARFS THE COST OF A SINGLE-USE CURETTE. - DISPOSABLE – THERE’S NO NEED TO WASTE TIME REMOVING HARDENED CEMENT OR RE-STERILIZING TOOLS.- LESS EXPENSIVE TO BUY AND INVENTORY - ORDER ONLY THE TOOL YOU USE.- PACKAGED 25 INDIVIDUALLY WRAPPED TOOLS TO A CARTON FOR CONVENIENT SHELF STORAGE.: Brand: WHITNEY NO-SCRATCH CURETTE (SMALL)

## (undated) DEVICE — NDL HYPO ECLPS SFTY 18G 1 1/2IN

## (undated) DEVICE — DISPOSABLE TOURNIQUET CUFF SINGLE BLADDER, SINGLE PORT AND QUICK CONNECT CONNECTOR: Brand: COLOR CUFF

## (undated) DEVICE — Device: Brand: PULSAVAC®

## (undated) DEVICE — GAUZE,SPONGE,4"X4",16PLY,STRL,LF,10/TRAY: Brand: MEDLINE

## (undated) DEVICE — STERILE PATIENT PROTECTIVE PAD FOR IMP® KNEE POSITIONERS & COHESIVE WRAP (10 / CASE): Brand: DE MAYO KNEE POSITIONER®

## (undated) DEVICE — SHEET,DRAPE,70X85,STERILE: Brand: MEDLINE

## (undated) DEVICE — SUT ETHLN 3-0 FS118IN 663H

## (undated) DEVICE — BNDG ELAS ECON W/CLIP 6IN 5YD LF STRL

## (undated) DEVICE — GLV SURG SENSICARE SLT PF LF 7.5 STRL

## (undated) DEVICE — STRYKER PERFORMANCE SERIES SAGITTAL BLADE: Brand: STRYKER PERFORMANCE SERIES

## (undated) DEVICE — 3 BONE CEMENT MIXER: Brand: MIXEVAC

## (undated) DEVICE — SUT ETHLN 3/0 FS1 663G

## (undated) DEVICE — DISPOSABLE TOURNIQUET CUFF 30"X4", 1-LINE, WHITE, STERILE, 1EA/PK, 10PK/CS: Brand: ASP MEDICAL

## (undated) DEVICE — DRSNG PAD ABD 8X10IN STRL

## (undated) DEVICE — SUT VIC 2/0 CT1 36IN

## (undated) DEVICE — BNDG ELAS MATRX V/CLS 6INX10YD LF

## (undated) DEVICE — TOWEL,OR,DSP,ST,BLUE,STD,4/PK,20PK/CS: Brand: MEDLINE

## (undated) DEVICE — BLD CUT FORMLA AGGR PLS 5.0MM

## (undated) DEVICE — MAT FLR ABS W/BLU/LINER 56X72IN WHT

## (undated) DEVICE — BASIC SINGLE BASIN-LF: Brand: MEDLINE INDUSTRIES, INC.